# Patient Record
Sex: FEMALE | Race: BLACK OR AFRICAN AMERICAN | Employment: UNEMPLOYED | URBAN - METROPOLITAN AREA
[De-identification: names, ages, dates, MRNs, and addresses within clinical notes are randomized per-mention and may not be internally consistent; named-entity substitution may affect disease eponyms.]

---

## 2018-03-14 ENCOUNTER — HOSPITAL ENCOUNTER (EMERGENCY)
Age: 33
Discharge: HOME | End: 2018-03-14
Attending: EMERGENCY MEDICINE
Payer: MEDICARE

## 2018-03-14 ENCOUNTER — APPOINTMENT (OUTPATIENT)
Dept: GENERAL RADIOLOGY | Age: 33
End: 2018-03-14
Payer: MEDICARE

## 2018-03-14 VITALS
HEART RATE: 82 BPM | DIASTOLIC BLOOD PRESSURE: 92 MMHG | RESPIRATION RATE: 18 BRPM | SYSTOLIC BLOOD PRESSURE: 148 MMHG | OXYGEN SATURATION: 98 % | TEMPERATURE: 97.7 F

## 2018-03-14 DIAGNOSIS — M25.561 ACUTE PAIN OF RIGHT KNEE: Primary | ICD-10-CM

## 2018-03-14 PROCEDURE — 6370000000 HC RX 637 (ALT 250 FOR IP): Performed by: NURSE PRACTITIONER

## 2018-03-14 PROCEDURE — G0382 LEV 3 HOSP TYPE B ED VISIT: HCPCS

## 2018-03-14 PROCEDURE — 73562 X-RAY EXAM OF KNEE 3: CPT

## 2018-03-14 RX ORDER — IBUPROFEN 800 MG/1
800 TABLET ORAL EVERY 8 HOURS PRN
Qty: 20 TABLET | Refills: 0 | Status: SHIPPED | OUTPATIENT
Start: 2018-03-14 | End: 2018-07-05

## 2018-03-14 RX ORDER — IBUPROFEN 800 MG/1
800 TABLET ORAL ONCE
Status: COMPLETED | OUTPATIENT
Start: 2018-03-14 | End: 2018-03-14

## 2018-03-14 RX ADMIN — IBUPROFEN 800 MG: 800 TABLET ORAL at 08:40

## 2018-03-14 ASSESSMENT — PAIN SCALES - GENERAL
PAINLEVEL_OUTOF10: 8
PAINLEVEL_OUTOF10: 8

## 2018-03-14 NOTE — ED PROVIDER NOTES
Colon Cancer Neg Hx     Eclampsia Neg Hx     Ovarian Cancer Neg Hx      Labor Neg Hx     Spont Abortions Neg Hx     Stroke Neg Hx        Allergies:  Patient has no known allergies. Home Medications:  Prior to Admission medications    Medication Sig Start Date End Date Taking? Authorizing Provider   ibuprofen (ADVIL;MOTRIN) 800 MG tablet Take 1 tablet by mouth every 8 hours as needed for Pain 3/14/18  Yes Nicole Naidu CNP   medroxyPROGESTERone (DEPO-PROVERA) 150 MG/ML injection Inject 1 mL into the muscle once for 1 dose Please Give IM Injection every 12 weeks. 16  Michela Dyson MD       REVIEW OF SYSTEMS    (2-9 systems for level 4, 10 or more for level 5)      Review of Systems   Musculoskeletal: Positive for gait problem. Right knee pain   Neurological: Negative for weakness and numbness. PHYSICAL EXAM   (up to 7 for level 4, 8 or more for level 5)      INITIAL VITALS:  oral temperature is 97.7 °F (36.5 °C). Her blood pressure is 148/92 (abnormal) and her pulse is 82. Her respiration is 18 and oxygen saturation is 98%. Physical Exam   Constitutional: She is oriented to person, place, and time. She appears well-developed and well-nourished. No distress. HENT:   Head: Normocephalic and atraumatic. Neck: Normal range of motion. Neck supple. Cardiovascular: Normal rate. Pulmonary/Chest: Effort normal. No respiratory distress. Abdominal: Soft. There is no tenderness. Musculoskeletal: She exhibits tenderness. Right knee stable with exam.  There is no appreciable effusion, no erythema, no warmth. There is no deformity. Patient is able to ambulate with a steady gait. There was some mild point tenderness over the patella. Neurological: She is alert and oriented to person, place, and time. Skin: Skin is warm and dry. Psychiatric: She has a normal mood and affect.  Her behavior is normal. Judgment and thought content normal.   Nursing note and vitals reviewed. DIFFERENTIAL  DIAGNOSIS   Internal knee derangement, right knee pain, right knee strain    PLAN (LABS / IMAGING / EKG):  Orders Placed This Encounter   Procedures    XR KNEE RIGHT (3 VIEWS)    Apply ace wrap    Velcro Knee Immobilizer       MEDICATIONS ORDERED:  Orders Placed This Encounter   Medications    ibuprofen (ADVIL;MOTRIN) tablet 800 mg    ibuprofen (ADVIL;MOTRIN) 800 MG tablet     Sig: Take 1 tablet by mouth every 8 hours as needed for Pain     Dispense:  20 tablet     Refill:  0       Controlled Substances Monitoring:      DIAGNOSTIC RESULTS / EMERGENCY DEPARTMENT COURSE / MDM     RADIOLOGY:   I directly visualized (with the attending physician) the following  images and reviewed the radiologist interpretations:  Xr Knee Right (3 Views)    Result Date: 3/14/2018  EXAMINATION: 3 VIEWS OF THE RIGHT KNEE 3/14/2018 9:09 am COMPARISON: None. HISTORY: ORDERING SYSTEM PROVIDED HISTORY: pain TECHNOLOGIST PROVIDED HISTORY: Reason for exam:->pain FINDINGS: No evidence of acute fracture or dislocation. No focal osseous lesion. No evidence of joint effusion. No focal soft tissue abnormality. No acute osseous abnormality of the knee. XR KNEE RIGHT (3 VIEWS)   Final Result   No acute osseous abnormality of the knee. LABS:  No results found for this visit on 03/14/18. EMERGENCY DEPARTMENT COURSE:  He did advise patient why an MRI would not be necessary in the emergency room environment. Patient does not appear satisfied with this explanation. I advised her she would probably benefit from physical therapy first and that we would do an x-ray. Patient advised of negative x-ray results. We will provide some type of compression to right knee. Patient understands importance of following up with her PCP for a follow-up appointment. She also understands she return to emergency room for any worsening symptoms or new concerns.     CONSULTS:  None    PROCEDURES:  None    FINAL

## 2018-03-14 NOTE — ED PROVIDER NOTES
GCS score is 15. Skin: Skin is warm and dry. No rash noted. She is not diaphoretic. No erythema. Psychiatric: Mood and affect normal.     We'll discharge patient to follow up with orthopedics. I told patient that she would have to get an MRI as an outpatient if indicated by orthopedics. Patient understands. Patient will get pain control and a knee immobilizer for comfort. Patient advised to only wear this when she is up walking and not when she is relaxing/seated at home/sleeping. I performed a history and physical examination of the patient and discussed management with the resident. I reviewed the residents note and agree with the documented findings and plan of care. Any areas of disagreement are noted on the chart. I was personally present for the key portions of any procedures. I have documented in the chart those procedures where I was not present during the key portions. I have personally reviewed all images and agree with the resident's interpretation. I have reviewed the emergency nurses triage note. I agree with the chief complaint, past medical history, past surgical history, allergies, medications, social and family history as documented unless otherwise noted below. Documentation of the HPI, Physical Exam and Medical Decision Making performed by medical students or scribes is based on my personal performance of the HPI, PE and MDM. For Phys Assistant/ Nurse Practitioner cases/documentation I have had a face to face evaluation of this patient and have completed at least one if not all key elements of the E/M (history, physical exam, and MDM). Additional findings are as noted. For APC cases I have personally evaluated and examined the patient in conjunction with the APC and agree with the treatment plan and disposition of the patient as recorded by the APC.     Sudeep Valles MD  Attending Emergency  Physician        Loreta Abbasi MD  03/14/18 [id number]

## 2018-05-29 ENCOUNTER — TELEPHONE (OUTPATIENT)
Dept: OBGYN | Age: 33
End: 2018-05-29

## 2018-05-30 ENCOUNTER — NURSE ONLY (OUTPATIENT)
Dept: OBGYN | Age: 33
End: 2018-05-30
Payer: MEDICARE

## 2018-05-30 VITALS — HEIGHT: 60 IN | WEIGHT: 214.9 LBS | TEMPERATURE: 98.2 F | BODY MASS INDEX: 42.19 KG/M2

## 2018-05-30 DIAGNOSIS — N91.2 AMENORRHEA: Primary | ICD-10-CM

## 2018-05-30 LAB
CONTROL: ABNORMAL
PREGNANCY TEST URINE, POC: POSITIVE

## 2018-05-30 PROCEDURE — 99211 OFF/OP EST MAY X REQ PHY/QHP: CPT | Performed by: OBSTETRICS & GYNECOLOGY

## 2018-05-30 PROCEDURE — 81025 URINE PREGNANCY TEST: CPT | Performed by: OBSTETRICS & GYNECOLOGY

## 2018-06-07 ENCOUNTER — TELEPHONE (OUTPATIENT)
Dept: OBGYN | Age: 33
End: 2018-06-07

## 2018-06-07 DIAGNOSIS — Z34.90 PREGNANCY WITH FETUS OF UNKNOWN GESTATIONAL AGE: Primary | ICD-10-CM

## 2018-06-27 ENCOUNTER — ANCILLARY PROCEDURE (OUTPATIENT)
Dept: OBGYN | Age: 33
End: 2018-06-27
Payer: MEDICARE

## 2018-06-27 DIAGNOSIS — Z34.90 PREGNANCY WITH FETUS OF UNKNOWN GESTATIONAL AGE: ICD-10-CM

## 2018-06-27 PROCEDURE — 76801 OB US < 14 WKS SINGLE FETUS: CPT | Performed by: RADIOLOGY

## 2018-06-27 PROCEDURE — 76801 OB US < 14 WKS SINGLE FETUS: CPT | Performed by: OBSTETRICS & GYNECOLOGY

## 2018-07-05 ENCOUNTER — INITIAL PRENATAL (OUTPATIENT)
Dept: OBGYN | Age: 33
End: 2018-07-05
Payer: MEDICARE

## 2018-07-05 ENCOUNTER — OFFICE VISIT (OUTPATIENT)
Dept: OBGYN | Age: 33
End: 2018-07-05
Payer: MEDICARE

## 2018-07-05 VITALS
BODY MASS INDEX: 42.97 KG/M2 | SYSTOLIC BLOOD PRESSURE: 104 MMHG | DIASTOLIC BLOOD PRESSURE: 70 MMHG | WEIGHT: 220 LBS | HEART RATE: 87 BPM

## 2018-07-05 DIAGNOSIS — Z64.1 GRAND MULTIPARA: ICD-10-CM

## 2018-07-05 DIAGNOSIS — Z78.9 UNKNOWN VARICELLA VACCINATION STATUS: ICD-10-CM

## 2018-07-05 DIAGNOSIS — O26.22: ICD-10-CM

## 2018-07-05 DIAGNOSIS — Z87.51 HISTORY OF PRETERM DELIVERY: ICD-10-CM

## 2018-07-05 DIAGNOSIS — J45.20 MILD INTERMITTENT ASTHMA WITHOUT COMPLICATION: ICD-10-CM

## 2018-07-05 DIAGNOSIS — Z92.89 HISTORY OF BLOOD TRANSFUSION: ICD-10-CM

## 2018-07-05 DIAGNOSIS — O09.91 HIGH-RISK PREGNANCY, FIRST TRIMESTER: ICD-10-CM

## 2018-07-05 DIAGNOSIS — O09.91 HIGH-RISK PREGNANCY, FIRST TRIMESTER: Primary | ICD-10-CM

## 2018-07-05 DIAGNOSIS — Z98.891 HISTORY OF VBAC: ICD-10-CM

## 2018-07-05 DIAGNOSIS — D57.3 SICKLE CELL TRAIT (HCC): ICD-10-CM

## 2018-07-05 DIAGNOSIS — O99.210 OBESITY COMPLICATING PREGNANCY, CHILDBIRTH, OR PUERPERIUM, ANTEPARTUM: ICD-10-CM

## 2018-07-05 DIAGNOSIS — J45.20 MILD INTERMITTENT ASTHMA WITHOUT COMPLICATION: Primary | ICD-10-CM

## 2018-07-05 DIAGNOSIS — Z98.891 HISTORY OF C-SECTION: ICD-10-CM

## 2018-07-05 DIAGNOSIS — T80.92XA BLOOD TRANSFUSION REACTION, INITIAL ENCOUNTER: ICD-10-CM

## 2018-07-05 PROCEDURE — 1036F TOBACCO NON-USER: CPT | Performed by: OBSTETRICS & GYNECOLOGY

## 2018-07-05 PROCEDURE — H1000 PRENATAL CARE ATRISK ASSESSM: HCPCS | Performed by: OBSTETRICS & GYNECOLOGY

## 2018-07-05 PROCEDURE — 99212 OFFICE O/P EST SF 10 MIN: CPT | Performed by: OBSTETRICS & GYNECOLOGY

## 2018-07-05 PROCEDURE — G8417 CALC BMI ABV UP PARAM F/U: HCPCS | Performed by: OBSTETRICS & GYNECOLOGY

## 2018-07-05 PROCEDURE — G8428 CUR MEDS NOT DOCUMENT: HCPCS | Performed by: OBSTETRICS & GYNECOLOGY

## 2018-07-05 RX ORDER — IBUPROFEN 200 MG
1 TABLET ORAL DAILY
Qty: 30 TABLET | Refills: 12 | Status: SHIPPED | OUTPATIENT
Start: 2018-07-05 | End: 2020-09-16 | Stop reason: ALTCHOICE

## 2018-07-05 RX ORDER — ALBUTEROL SULFATE 90 UG/1
2 AEROSOL, METERED RESPIRATORY (INHALATION) EVERY 6 HOURS PRN
Qty: 1 INHALER | Refills: 3 | Status: SHIPPED | OUTPATIENT
Start: 2018-07-05 | End: 2022-09-12 | Stop reason: SDUPTHER

## 2018-07-05 NOTE — PROGRESS NOTES
Carmine Barraza  2018    YOB: 1985          The patient was seen today. She is here regarding request for inhaler to treat her asthma. Megan Conway HPI:  Carmine Barraza is a 28 y.o. female [id number] Patient's last menstrual period was 2018 (exact date). Gestational age 8 8/9 weeks. At the time of her initial OB Education visit she requested a RF for her albuterol inhaler. Pt reports a history of asthma since childhood. She had a PCP at the [facility] but is scheduled to see a new doctor at the end of August.  Pt reports and episode of SOB last week and become dizzy and lightheaded. She used her child's inhaler and her symptoms resolved. Pt denies CP, SOB or wheezing today. Denies bleeding since her LMP.         Obstetric History       T2      L4     SAB4   TAB1   Ectopic0   Molar0   Multiple1   Live Births4       # Outcome Date GA Lbr Bairon/2nd Weight Sex Delivery Anes PTL Lv   10 Current            9 Term 14 40w2d 04:32 6 lb 8 oz (2.948 kg) M   N SUN      Name: Warren Settin                Apgar5: 9   8   36w0d  4 lb 4 oz (1.928 kg) F CS-LTranv   SUN   7   36w0d  4 lb 8 oz (2.041 kg) M CS-LTranv   SUN   6 Term  39w0d  5 lb 10 oz (2.551 kg) F Vag-Spont   SUN   5 SAB            4 SAB            3 SAB            2 SAB            1 TAB               Obstetric Comments   Same partner with all of her pregnancies        Past Medical History:   Diagnosis Date    Asthma     Blood transfusion reaction     2008 after csection    Complication of anesthesia     back pain for 1 year after the spinal     Microcytic anemia 3/3/2014    Sickle cell trait ([facility] [address] 2013       Past Surgical History:   Procedure Laterality Date     SECTION             Family History   Problem Relation Age of Onset    Diabetes Father     Hypertension Father     Diabetes Mother     Hypertension Mother     Depression Mother     Mental Illness Mother     Other Sister         CP  complications     Heart Attack Brother     Cancer Maternal Aunt     Uterine Cancer Maternal Aunt     Breast Cancer Neg Hx     Colon Cancer Neg Hx     Eclampsia Neg Hx     Ovarian Cancer Neg Hx      Labor Neg Hx     Spont Abortions Neg Hx     Stroke Neg Hx        Social History     Social History    Marital status: Single     Spouse name: N/A    Number of children: N/A    Years of education: N/A     Occupational History    Not on file. Social History Main Topics    Smoking status: Never Smoker    Smokeless tobacco: Never Used    Alcohol use No    Drug use: No    Sexual activity: Yes     Partners: Male     Other Topics Concern    Not on file     Social History Narrative    No narrative on file         MEDICATIONS:  Current Outpatient Prescriptions on File Prior to Visit   Medication Sig Dispense Refill    Prenatal Multivit-Min-Fe-FA (PRENATAL FORTE) TABS Take 1 tablet by mouth Daily May  Substitute with any prenatal vit pt insurance will cover 30 tablet 12     No current facility-administered medications on file prior to visit. ALLERGIES:  Allergies as of 2018    (No Known Allergies)       Last menstrual period 2018, not currently breastfeeding. Patient is in no acute distress. Heart: regular rhythm, no murmurs  Chest: clear, no wheezes        Lab Results:  Results for orders placed or performed in visit on 18   POCT urine pregnancy   Result Value Ref Range    Preg Test, Ur Positive     Control hCG          Assessment:       Diagnosis Orders   1. Mild intermittent asthma without complication     2. High-risk pregnancy, first trimester           PLAN:    1. Mild intermittent asthma without complication  - albuterol inhaler Rx  - pt discouraged from using medications prescribed to others    2. High-risk pregnancy, first trimester  - return for initial ob visit.         Patient was seen with total face to face

## 2018-07-19 ENCOUNTER — ROUTINE PRENATAL (OUTPATIENT)
Dept: PERINATAL CARE | Age: 33
End: 2018-07-19
Payer: MEDICARE

## 2018-07-19 ENCOUNTER — HOSPITAL ENCOUNTER (OUTPATIENT)
Age: 33
Setting detail: SPECIMEN
Discharge: HOME | End: 2018-07-19
Payer: MEDICARE

## 2018-07-19 VITALS
WEIGHT: 219 LBS | SYSTOLIC BLOOD PRESSURE: 109 MMHG | RESPIRATION RATE: 16 BRPM | BODY MASS INDEX: 43 KG/M2 | TEMPERATURE: 98.7 F | DIASTOLIC BLOOD PRESSURE: 67 MMHG | HEART RATE: 82 BPM | HEIGHT: 60 IN

## 2018-07-19 DIAGNOSIS — O34.219 PREVIOUS CESAREAN DELIVERY, ANTEPARTUM CONDITION OR COMPLICATION: ICD-10-CM

## 2018-07-19 DIAGNOSIS — O09.91 HIGH-RISK PREGNANCY, FIRST TRIMESTER: ICD-10-CM

## 2018-07-19 DIAGNOSIS — Z36.9 FIRST TRIMESTER SCREENING: Primary | ICD-10-CM

## 2018-07-19 DIAGNOSIS — O99.211 OBESITY AFFECTING PREGNANCY IN FIRST TRIMESTER: ICD-10-CM

## 2018-07-19 DIAGNOSIS — Z78.9 UNKNOWN VARICELLA VACCINATION STATUS: ICD-10-CM

## 2018-07-19 DIAGNOSIS — Z3A.13 13 WEEKS GESTATION OF PREGNANCY: ICD-10-CM

## 2018-07-19 DIAGNOSIS — O99.810 ABNORMAL MATERNAL GLUCOSE TOLERANCE, ANTEPARTUM: ICD-10-CM

## 2018-07-19 DIAGNOSIS — J45.909 ASTHMA AFFECTING PREGNANCY, ANTEPARTUM: ICD-10-CM

## 2018-07-19 DIAGNOSIS — O99.519 ASTHMA AFFECTING PREGNANCY, ANTEPARTUM: ICD-10-CM

## 2018-07-19 DIAGNOSIS — O36.80X0 ENCOUNTER TO DETERMINE FETAL VIABILITY OF PREGNANCY, SINGLE OR UNSPECIFIED FETUS: ICD-10-CM

## 2018-07-19 LAB
-: NORMAL
ABO/RH: NORMAL
ABSOLUTE EOS #: 0.11 K/UL (ref 0–0.44)
ABSOLUTE IMMATURE GRANULOCYTE: 0.08 K/UL (ref 0–0.3)
ABSOLUTE LYMPH #: 2.61 K/UL (ref 1.1–3.7)
ABSOLUTE MONO #: 0.6 K/UL (ref 0.1–1.2)
AMORPHOUS: NORMAL
ANTIBODY SCREEN: NEGATIVE
BACTERIA: NORMAL
BASOPHILS # BLD: 1 % (ref 0–2)
BASOPHILS ABSOLUTE: 0.07 K/UL (ref 0–0.2)
BILIRUBIN URINE: NEGATIVE
CASTS UA: NORMAL /LPF (ref 0–8)
COLOR: YELLOW
CRYSTALS, UA: NORMAL /HPF
DIFFERENTIAL TYPE: ABNORMAL
EOSINOPHILS RELATIVE PERCENT: 1 % (ref 1–4)
EPITHELIAL CELLS UA: NORMAL /HPF (ref 0–5)
GLUCOSE ADMINISTRATION: NORMAL
GLUCOSE TOLERANCE SCREEN 50G: 133 MG/DL (ref 70–135)
GLUCOSE URINE: NEGATIVE
HCT VFR BLD CALC: 35.8 % (ref 36.3–47.1)
HEMOGLOBIN: 11.2 G/DL (ref 11.9–15.1)
HEPATITIS B SURFACE ANTIGEN: NONREACTIVE
HIV AG/AB: NONREACTIVE
IMMATURE GRANULOCYTES: 1 %
KETONES, URINE: NEGATIVE
LEUKOCYTE ESTERASE, URINE: NEGATIVE
LYMPHOCYTES # BLD: 19 % (ref 24–43)
MCH RBC QN AUTO: 23.9 PG (ref 25.2–33.5)
MCHC RBC AUTO-ENTMCNC: 31.3 G/DL (ref 28.4–34.8)
MCV RBC AUTO: 76.5 FL (ref 82.6–102.9)
MONOCYTES # BLD: 4 % (ref 3–12)
MUCUS: NORMAL
NITRITE, URINE: NEGATIVE
NRBC AUTOMATED: 0 PER 100 WBC
OTHER OBSERVATIONS UA: NORMAL
PDW BLD-RTO: 15 % (ref 11.8–14.4)
PH UA: 5 (ref 5–8)
PLATELET # BLD: 324 K/UL (ref 138–453)
PLATELET ESTIMATE: ABNORMAL
PMV BLD AUTO: 11.3 FL (ref 8.1–13.5)
PROTEIN UA: NEGATIVE
RBC # BLD: 4.68 M/UL (ref 3.95–5.11)
RBC # BLD: ABNORMAL 10*6/UL
RBC UA: NORMAL /HPF (ref 0–4)
RENAL EPITHELIAL, UA: NORMAL /HPF
RUBV IGG SER QL: 150.2 IU/ML
SEG NEUTROPHILS: 74 % (ref 36–65)
SEGMENTED NEUTROPHILS ABSOLUTE COUNT: 10.62 K/UL (ref 1.5–8.1)
SPECIFIC GRAVITY UA: 1.02 (ref 1–1.03)
T. PALLIDUM, IGG: NONREACTIVE
TRICHOMONAS: NORMAL
TURBIDITY: CLEAR
URINE HGB: NEGATIVE
UROBILINOGEN, URINE: NORMAL
WBC # BLD: 14.1 K/UL (ref 3.5–11.3)
WBC # BLD: ABNORMAL 10*3/UL
WBC UA: NORMAL /HPF (ref 0–5)
YEAST: NORMAL

## 2018-07-19 PROCEDURE — 87340 HEPATITIS B SURFACE AG IA: CPT

## 2018-07-19 PROCEDURE — 76813 OB US NUCHAL MEAS 1 GEST: CPT | Performed by: OBSTETRICS & GYNECOLOGY

## 2018-07-19 PROCEDURE — 82950 GLUCOSE TEST: CPT

## 2018-07-19 PROCEDURE — 76801 OB US < 14 WKS SINGLE FETUS: CPT | Performed by: OBSTETRICS & GYNECOLOGY

## 2018-07-19 PROCEDURE — 87389 HIV-1 AG W/HIV-1&-2 AB AG IA: CPT

## 2018-07-19 PROCEDURE — 81001 URINALYSIS AUTO W/SCOPE: CPT

## 2018-07-19 PROCEDURE — 86850 RBC ANTIBODY SCREEN: CPT

## 2018-07-19 PROCEDURE — 86901 BLOOD TYPING SEROLOGIC RH(D): CPT

## 2018-07-19 PROCEDURE — 36415 COLL VENOUS BLD VENIPUNCTURE: CPT

## 2018-07-19 PROCEDURE — 86762 RUBELLA ANTIBODY: CPT

## 2018-07-19 PROCEDURE — 86787 VARICELLA-ZOSTER ANTIBODY: CPT

## 2018-07-19 PROCEDURE — 87086 URINE CULTURE/COLONY COUNT: CPT

## 2018-07-19 PROCEDURE — 86900 BLOOD TYPING SEROLOGIC ABO: CPT

## 2018-07-19 PROCEDURE — 86780 TREPONEMA PALLIDUM: CPT

## 2018-07-19 PROCEDURE — 85025 COMPLETE CBC W/AUTO DIFF WBC: CPT

## 2018-07-20 LAB
CULTURE: NO GROWTH
Lab: NORMAL
SPECIMEN DESCRIPTION: NORMAL
STATUS: NORMAL
VZV IGG SER QL IA: 0.3

## 2018-07-21 DIAGNOSIS — O99.810 ABNORMAL GLUCOSE TOLERANCE TEST IN PREGNANCY: Primary | ICD-10-CM

## 2018-07-21 PROBLEM — Z28.39 SUSCEPTIBLE TO VARICELLA (NON-IMMUNE), CURRENTLY PREGNANT: Status: ACTIVE | Noted: 2018-07-21

## 2018-07-21 PROBLEM — O09.899 SUSCEPTIBLE TO VARICELLA (NON-IMMUNE), CURRENTLY PREGNANT: Status: ACTIVE | Noted: 2018-07-21

## 2018-07-23 ENCOUNTER — ROUTINE PRENATAL (OUTPATIENT)
Dept: OBGYN | Age: 33
End: 2018-07-23
Payer: MEDICARE

## 2018-07-23 ENCOUNTER — HOSPITAL ENCOUNTER (OUTPATIENT)
Age: 33
Setting detail: SPECIMEN
Discharge: HOME | End: 2018-07-23
Payer: MEDICARE

## 2018-07-23 VITALS
HEART RATE: 103 BPM | BODY MASS INDEX: 43.22 KG/M2 | WEIGHT: 221.3 LBS | DIASTOLIC BLOOD PRESSURE: 84 MMHG | SYSTOLIC BLOOD PRESSURE: 109 MMHG

## 2018-07-23 DIAGNOSIS — Z3A.14 14 WEEKS GESTATION OF PREGNANCY: ICD-10-CM

## 2018-07-23 DIAGNOSIS — O09.92 HIGH-RISK PREGNANCY IN SECOND TRIMESTER: Primary | ICD-10-CM

## 2018-07-23 LAB
DIRECT EXAM: NORMAL
Lab: NORMAL
SPECIMEN DESCRIPTION: NORMAL
STATUS: NORMAL

## 2018-07-23 PROCEDURE — 99213 OFFICE O/P EST LOW 20 MIN: CPT | Performed by: STUDENT IN AN ORGANIZED HEALTH CARE EDUCATION/TRAINING PROGRAM

## 2018-07-23 PROCEDURE — 87801 DETECT AGNT MULT DNA AMPLI: CPT | Performed by: STUDENT IN AN ORGANIZED HEALTH CARE EDUCATION/TRAINING PROGRAM

## 2018-07-23 PROCEDURE — P3000 SCREEN PAP BY TECH W MD SUPV: HCPCS | Performed by: STUDENT IN AN ORGANIZED HEALTH CARE EDUCATION/TRAINING PROGRAM

## 2018-07-23 PROCEDURE — 87480 CANDIDA DNA DIR PROBE: CPT | Performed by: STUDENT IN AN ORGANIZED HEALTH CARE EDUCATION/TRAINING PROGRAM

## 2018-07-23 PROCEDURE — 99213 OFFICE O/P EST LOW 20 MIN: CPT | Performed by: OBSTETRICS & GYNECOLOGY

## 2018-07-23 NOTE — PROGRESS NOTES
Date: 2018  Time: 10:48 AM      Patient Name: Zhang Pollard  Patient : 1985  Room/Bed: Room/bed info not found  Admission Date/Time: No admission date for patient encounter. Attending Physician Statement  I have discussed the care of Zhang Pollard, including pertinent history and exam findings with the resident. I have reviewed and edited their note in the electronic medical record. The key elements of all parts of the encounter have been performed/reviewed by me . I agree with the assessment, plan and orders as documented by the resident. The level of care submitted represents to the best of my ability the care documented in the medical record today. GC Modifier. This service has been performed in part by a resident under the direction of a teaching physician. Patient was seen today for her initial pre-yolanda physician visit @ 14w2d. First trimester genetic screen was normal. Pap smear and cultures obtained today and FHR was affirmed by hand-held doppler device. Fetal anatomy screen is already scheduled with MFM. RTO in 4 weeks.     Attending's Name:  Gretchen Mayer MD
T-97.8     Orally  R-18  HT-5'0\"   Pain-None     Rate-
(100.4 kg)       BP: 109/84  Weight: 221 lb 4.8 oz (100.4 kg)  Pulse: 103  Patient Position: Sitting  Albumin: Negative  Glucose: Negative     Physical Exam: Completed, See Epic Navigator        Assessment & Plan:  Jass Contreras is a 28 y.o. female [id number] at 14w2d Initial Obstetrical Visit   - The patient was seen full history and physical was completed/reviewed. - Prenatal labs done   - Prenatal vitamins prescription Given   - Problem list reviewed and updated   - First Trimester/NT Screen : results pending, MSAFP ordered   - Follow up with MFM for Anatomy scan, appointment scheduled   - Gc/Chlam Cultures & Wet Prep Collected, results pending   - Pap Smear done, results pending    Asthma   - Follow up with PCP for management    Elevated early 1 hour GTT   - early 3 hour GTT ordered    History of 1 successful    - Patient plans for TOLAC for this pregnancy    Upon completion of the visit all questions were answered and the patients follow-up and testing schedule were reviewed. Patient Active Problem List    Diagnosis Date Noted    History of  2014     Priority: High             Asthma 2014     Priority: Low     Mild intermittent, Pt reports she has appt with her provider at [facility] 2018   Pt states her asthma is more problematic since she became pregnant   18- Pt saw Dr. Armando Waller today and rx sent to her pharm for inhaler       Abnormal glucose tolerance test in pregnancy 2018     3 hr GTT      Susceptible to varicella (non-immune), currently pregnant 2018    Obesity affecting pregnancy in first trimester 2018    Abnormal maternal glucose tolerance, antepartum 2018    Asthma affecting pregnancy, antepartum 2018    Previous  delivery, antepartum condition or complication     Sickle cell trait ([facility] [address] 2018     Same Partner. Pt reports that her partner Patel Keys is neg for trait.  Consider testing partner if not

## 2018-07-24 LAB
C TRACH DNA GENITAL QL NAA+PROBE: NEGATIVE
N. GONORRHOEAE DNA: NEGATIVE

## 2018-07-25 LAB
HPV SAMPLE: NORMAL
HPV SOURCE: NORMAL
HPV, GENOTYPE 16: NOT DETECTED
HPV, GENOTYPE 18: NOT DETECTED
HPV, HIGH RISK OTHER: NOT DETECTED
HPV, INTERPRETATION: NORMAL

## 2018-08-07 LAB — CYTOLOGY REPORT: NORMAL

## 2018-08-30 ENCOUNTER — ROUTINE PRENATAL (OUTPATIENT)
Dept: OBGYN | Age: 33
End: 2018-08-30
Payer: MEDICARE

## 2018-08-30 ENCOUNTER — HOSPITAL ENCOUNTER (OUTPATIENT)
Age: 33
Setting detail: SPECIMEN
Discharge: HOME | End: 2018-08-30
Payer: MEDICARE

## 2018-08-30 VITALS
DIASTOLIC BLOOD PRESSURE: 71 MMHG | BODY MASS INDEX: 44.27 KG/M2 | SYSTOLIC BLOOD PRESSURE: 117 MMHG | WEIGHT: 226.7 LBS | HEART RATE: 101 BPM

## 2018-08-30 DIAGNOSIS — O09.92 HRP (HIGH RISK PREGNANCY), SECOND TRIMESTER: ICD-10-CM

## 2018-08-30 DIAGNOSIS — Z3A.19 19 WEEKS GESTATION OF PREGNANCY: ICD-10-CM

## 2018-08-30 DIAGNOSIS — O09.92 HRP (HIGH RISK PREGNANCY), SECOND TRIMESTER: Primary | ICD-10-CM

## 2018-08-30 PROCEDURE — G8417 CALC BMI ABV UP PARAM F/U: HCPCS | Performed by: OBSTETRICS & GYNECOLOGY

## 2018-08-30 PROCEDURE — 36415 COLL VENOUS BLD VENIPUNCTURE: CPT

## 2018-08-30 PROCEDURE — 99212 OFFICE O/P EST SF 10 MIN: CPT | Performed by: OBSTETRICS & GYNECOLOGY

## 2018-08-30 PROCEDURE — 1036F TOBACCO NON-USER: CPT | Performed by: OBSTETRICS & GYNECOLOGY

## 2018-08-30 PROCEDURE — 99213 OFFICE O/P EST LOW 20 MIN: CPT | Performed by: OBSTETRICS & GYNECOLOGY

## 2018-08-30 PROCEDURE — 82105 ALPHA-FETOPROTEIN SERUM: CPT

## 2018-08-30 PROCEDURE — G8427 DOCREV CUR MEDS BY ELIG CLIN: HCPCS | Performed by: OBSTETRICS & GYNECOLOGY

## 2018-09-02 LAB
AFP INTERPRETATION: NORMAL
AFP MOM: 1.02
AFP SPECIMEN: NORMAL
AFP: 46 NG/ML
DATE OF BIRTH: NORMAL
DATING METHOD: NORMAL
DETERMINED BY: NORMAL
DIABETIC: NO
DUE DATE: NORMAL
ESTIMATED DUE DATE: NORMAL
FAMILY HISTORY NTD: NO
GESTATIONAL AGE: NORMAL
INSULIN REQ DIABETES: NO
LAST MENSTRUAL PERIOD: NORMAL
MATERNAL AGE AT EDD: 33.4 YR
MATERNAL WEIGHT: 226
MONOCHORIONIC TWINS: NORMAL
NUMBER OF FETUSES: NORMAL
PATIENT WEIGHT UNITS: NORMAL
PATIENT WEIGHT: NORMAL
RACE (MATERNAL): NORMAL
RACE: NORMAL
REPEAT SPECIMEN?: NORMAL
SMOKING: NO
SMOKING: NO
VALPROIC/CARBAMAZEP: NORMAL
ZZ NTE CLEAN UP: HISTORY: NO

## 2018-09-06 ENCOUNTER — ROUTINE PRENATAL (OUTPATIENT)
Dept: PERINATAL CARE | Age: 33
End: 2018-09-06
Payer: MEDICARE

## 2018-09-06 VITALS
WEIGHT: 227 LBS | SYSTOLIC BLOOD PRESSURE: 122 MMHG | HEIGHT: 60 IN | TEMPERATURE: 98.5 F | RESPIRATION RATE: 16 BRPM | HEART RATE: 103 BPM | DIASTOLIC BLOOD PRESSURE: 72 MMHG | BODY MASS INDEX: 44.57 KG/M2

## 2018-09-06 DIAGNOSIS — O99.810 ABNORMAL MATERNAL GLUCOSE TOLERANCE, ANTEPARTUM: ICD-10-CM

## 2018-09-06 DIAGNOSIS — J45.909 ASTHMA AFFECTING PREGNANCY, ANTEPARTUM: ICD-10-CM

## 2018-09-06 DIAGNOSIS — O99.519 ASTHMA AFFECTING PREGNANCY, ANTEPARTUM: ICD-10-CM

## 2018-09-06 DIAGNOSIS — Z3A.20 20 WEEKS GESTATION OF PREGNANCY: ICD-10-CM

## 2018-09-06 DIAGNOSIS — O99.212 OBESITY AFFECTING PREGNANCY IN SECOND TRIMESTER: Primary | ICD-10-CM

## 2018-09-06 DIAGNOSIS — Z36.86 ENCOUNTER FOR SCREENING FOR RISK OF PRE-TERM LABOR: ICD-10-CM

## 2018-09-06 PROCEDURE — 76811 OB US DETAILED SNGL FETUS: CPT | Performed by: OBSTETRICS & GYNECOLOGY

## 2018-09-06 PROCEDURE — 76817 TRANSVAGINAL US OBSTETRIC: CPT | Performed by: OBSTETRICS & GYNECOLOGY

## 2018-09-27 ENCOUNTER — ROUTINE PRENATAL (OUTPATIENT)
Dept: OBGYN | Age: 33
End: 2018-09-27
Payer: MEDICARE

## 2018-09-27 ENCOUNTER — TELEPHONE (OUTPATIENT)
Dept: OBGYN | Age: 33
End: 2018-09-27

## 2018-09-27 VITALS — SYSTOLIC BLOOD PRESSURE: 118 MMHG | WEIGHT: 227.1 LBS | BODY MASS INDEX: 44.35 KG/M2 | DIASTOLIC BLOOD PRESSURE: 86 MMHG

## 2018-09-27 DIAGNOSIS — Z3A.23 23 WEEKS GESTATION OF PREGNANCY: ICD-10-CM

## 2018-09-27 DIAGNOSIS — O09.92 HRP (HIGH RISK PREGNANCY), SECOND TRIMESTER: Primary | ICD-10-CM

## 2018-09-27 PROCEDURE — 1036F TOBACCO NON-USER: CPT | Performed by: OBSTETRICS & GYNECOLOGY

## 2018-09-27 PROCEDURE — 99212 OFFICE O/P EST SF 10 MIN: CPT | Performed by: OBSTETRICS & GYNECOLOGY

## 2018-09-27 PROCEDURE — G8427 DOCREV CUR MEDS BY ELIG CLIN: HCPCS | Performed by: OBSTETRICS & GYNECOLOGY

## 2018-09-27 PROCEDURE — 99213 OFFICE O/P EST LOW 20 MIN: CPT | Performed by: OBSTETRICS & GYNECOLOGY

## 2018-09-27 PROCEDURE — G8417 CALC BMI ABV UP PARAM F/U: HCPCS | Performed by: OBSTETRICS & GYNECOLOGY

## 2018-09-27 NOTE — PATIENT INSTRUCTIONS
The patient will return to the office for her next visit in 4 weeks. 28 weeks labs deferred until the next clinic visit.

## 2018-09-27 NOTE — PROGRESS NOTES
Juan Antonio Power 35 y.o. [id number] at 23w5d     Weight 227 lb 1.6 oz (103 kg), last menstrual period 2018, not currently breastfeeding. The patient was seen and evaluated. There was positive fetal movements. No contractions or leakage of fluid. Signs and symptoms of  labor as well as labor were reviewed. The S/S of Pre-Eclampsia were reviewed with the patient in detail. She is to report any of these if they occur. She currently denies any of these. The patient had her 28 week labs: Deferred until next clinic visit. The patient was instructed on fetal kick counts and was given a kick sheet to complete every 8 hours. She was instructed that the baby should move at a minimum of ten times within one hour after a meal. The patient was instructed to lay down on her left side twenty minutes after eating and count movements for up to one hour with a target value of ten movements. She was instructed to notify the office if she did not make that target after two attempts or if after any attempt there was less than four movements. The patient reports that the targets have been made Yes.     Patient Active Problem List   Diagnosis    Asthma    Obesity    History of     Sickle cell trait ([facility] [address]    History of     Grand multipara    Four previous spontaneous abortions (SAB) affecting care of mother, antepartum, second trimester    History of blood transfusion    Blood transfusion reaction    History of  delivery    High-risk pregnancy, first trimester    Obesity affecting pregnancy in first trimester    Abnormal maternal glucose tolerance, antepartum    Asthma affecting pregnancy, antepartum    Previous  delivery, antepartum condition or complication    Abnormal glucose tolerance test in pregnancy    Susceptible to varicella (non-immune), currently pregnant    Obesity affecting pregnancy in second trimester           The patient will return to the office for her next visit in 4 weeks. 28 weeks labs deferred until the next clinic visit. See antepartum flow sheet.

## 2018-10-29 ENCOUNTER — HOSPITAL ENCOUNTER (OUTPATIENT)
Age: 33
Setting detail: SPECIMEN
Discharge: HOME | End: 2018-10-29
Payer: MEDICARE

## 2018-10-29 ENCOUNTER — ROUTINE PRENATAL (OUTPATIENT)
Dept: OBGYN | Age: 33
End: 2018-10-29
Payer: MEDICARE

## 2018-10-29 VITALS
DIASTOLIC BLOOD PRESSURE: 74 MMHG | WEIGHT: 231.9 LBS | BODY MASS INDEX: 45.29 KG/M2 | HEART RATE: 103 BPM | SYSTOLIC BLOOD PRESSURE: 108 MMHG

## 2018-10-29 DIAGNOSIS — Z3A.28 28 WEEKS GESTATION OF PREGNANCY: ICD-10-CM

## 2018-10-29 DIAGNOSIS — O09.93 HIGH-RISK PREGNANCY IN THIRD TRIMESTER: Primary | ICD-10-CM

## 2018-10-29 PROCEDURE — 99213 OFFICE O/P EST LOW 20 MIN: CPT | Performed by: STUDENT IN AN ORGANIZED HEALTH CARE EDUCATION/TRAINING PROGRAM

## 2018-10-29 PROCEDURE — G8427 DOCREV CUR MEDS BY ELIG CLIN: HCPCS | Performed by: STUDENT IN AN ORGANIZED HEALTH CARE EDUCATION/TRAINING PROGRAM

## 2018-10-29 PROCEDURE — 1036F TOBACCO NON-USER: CPT | Performed by: STUDENT IN AN ORGANIZED HEALTH CARE EDUCATION/TRAINING PROGRAM

## 2018-10-29 PROCEDURE — G8417 CALC BMI ABV UP PARAM F/U: HCPCS | Performed by: STUDENT IN AN ORGANIZED HEALTH CARE EDUCATION/TRAINING PROGRAM

## 2018-10-29 PROCEDURE — 90715 TDAP VACCINE 7 YRS/> IM: CPT | Performed by: OBSTETRICS & GYNECOLOGY

## 2018-10-29 PROCEDURE — G8484 FLU IMMUNIZE NO ADMIN: HCPCS | Performed by: STUDENT IN AN ORGANIZED HEALTH CARE EDUCATION/TRAINING PROGRAM

## 2018-10-29 PROCEDURE — 99212 OFFICE O/P EST SF 10 MIN: CPT | Performed by: STUDENT IN AN ORGANIZED HEALTH CARE EDUCATION/TRAINING PROGRAM

## 2018-10-29 NOTE — PROGRESS NOTES
Prenatal Visit    Haylie Paredes is a 35 y.o. female [id number] at 28w2d    The patient was seen and evaluated. She denies any complaints. There was positive fetal movements. She denies contractions, vaginal bleeding and leakage of fluid. Signs and symptoms of  labor as well as labor were reviewed. The S/S of Pre-Eclampsia were reviewed with the patient in detail. She is to report any of these if they occur. She currently denies any of these. The patient was instructed on fetal kick counts every 8 hours. She was instructed that the baby should move at a minimum of ten times within one hour after a meal. The patient was instructed to lay down on her left side twenty minutes after eating and count movements for up to one hour with a target value of ten movements. She was instructed to notify the office if she did not make that target after two attempts or if after any attempt there was less than four movements. The patient reports that the targets have been made. The patient requested the T-Dap Vaccine (27-36 weeks) this pregnancy. The patient declined the influenza vaccine this year. The problem list reflects the active issues addressed during today's visit    Vitals:  BP: 108/74  Weight: 231 lb 14.4 oz (105.2 kg)  Pulse: 103  Patient Position: Sitting  Albumin: Negative  Glucose: Negative  Fundal Height (cm): 28 cm  Fetal Heart Rate: 140  Movement: Present     28 Week Labs: The patient is RH positive, Rhogam not indicated  ABO/Rh   Date Value Ref Range Status   2018 B POSITIVE  Final       Early 1hr GTT: 133, 3 hr GTT ordered    28 week CBC: ordered  UA w/ Ur C&S: ordered     Assessment & Plan:  Haylie Paredes is a 35 y.o. female [id number] at 28w2d   - 28 week labs ordered   - discussed recommendations for TDAP immunization, patient requested TDAP.    - Declines flu shot at this visit, will consider next time    - PO hydration encouraged    - S/S of  labor reviewed     Elevated early 1 hr GTT    - Early 1 hr    - 3 hr GTT ordered    Obesity   - ASA 81 mg daily    - Follow up US with MFM on     Sickle cell trait    - Pt reports her partner has had negative Hgb electrophoresis in the past    - Records not visible in Epic   - Encouraged pt to bring partner's lab records    Patient Active Problem List    Diagnosis Date Noted    History of  2014     Priority: High             Asthma 2014     Priority: Low     Mild intermittent, Pt reports she [facility] 2018   Pt states her asthma is more problematic since she became pregnant   18- Pt saw Dr. Ignacio Walden today and rx sent to her pharm for inhaler       Obesity affecting pregnancy in second trimester 2018    Abnormal glucose tolerance test in pregnancy 2018     3 hr GTT      Susceptible to varicella (non-immune), currently pregnant 2018    Obesity affecting pregnancy in first trimester 2018    Abnormal maternal glucose tolerance, antepartum 2018    Asthma affecting pregnancy, antepartum 2018    Previous  delivery, antepartum condition or complication     Sickle cell trait ([facility] [address] 2018     Same Partner. Pt reports that her partner Genevive Maxim is neg for trait. Consider testing partner if not already done.  History of  2018 for twin gestation      [facility] [place] multip[place] 2018    Four previous spontaneous abortions (SAB) affecting care of mother, antepartum, second trimester 2018    History of blood transfusion 2018 PP Twin delivery       Blood transfusion reaction 2018      History of  delivery 2018     Indicated- twin delivery       High-risk pregnancy, first trimester 2018- MFM referral placed for first trimester screen and anatomy scan u/s       Obesity 2014- Early diabetic screening.   1 hr gtt ordered       Return in about 2 weeks (around 2018) for GABRIELLA with Dr. Ramos Mayorga.    The patient was counseled on signs and symptoms of  labor and recommended to go to the hospital if any of the signs are experienced. The patient was counseled on Labor & Delivery. Route of delivery and counseling on vaginal, operative vaginal, and  sections were completed with the risks of each to both the patient as well as her baby. The possibility of a blood transfusion was discussed as well. The patient was not opposed to receiving a transfusion if needed. The patient was counseled on the mandatory call ahead policy. She has been instructed to call the office at anytime prior to going into the hospital so the on-call physician may direct her to the appropriate facility for care. Exceptions were reviewed including but not limited to: Decreased fetal movement, vaginal Bleeding or hemorrhage, trauma, readily expectant delivery, or any instance where she feels 911 should be utilized.     Zoe Loo DO  Ob/Gyn Resident  [facility], [address]  10/29/2018, 10:52 AM

## 2018-10-31 ENCOUNTER — HOSPITAL ENCOUNTER (OUTPATIENT)
Age: 33
Setting detail: SPECIMEN
Discharge: HOME | End: 2018-10-31
Payer: MEDICARE

## 2018-10-31 DIAGNOSIS — Z3A.28 28 WEEKS GESTATION OF PREGNANCY: ICD-10-CM

## 2018-10-31 LAB
ABSOLUTE EOS #: 0.17 K/UL (ref 0–0.44)
ABSOLUTE IMMATURE GRANULOCYTE: 0.14 K/UL (ref 0–0.3)
ABSOLUTE LYMPH #: 2.59 K/UL (ref 1.1–3.7)
ABSOLUTE MONO #: 0.88 K/UL (ref 0.1–1.2)
AMOUNT GLUCOSE GIVEN: 100 G
BASOPHILS # BLD: 0 % (ref 0–2)
BASOPHILS ABSOLUTE: 0.05 K/UL (ref 0–0.2)
CULTURE: NORMAL
DIFFERENTIAL TYPE: ABNORMAL
EOSINOPHILS RELATIVE PERCENT: 1 % (ref 1–4)
GLUCOSE FASTING: 76 MG/DL (ref 65–99)
GLUCOSE TOLERANCE TEST 1 HOUR: 134 MG/DL (ref 65–184)
GLUCOSE TOLERANCE TEST 2 HOUR: 108 MG/DL (ref 65–139)
GLUCOSE TOLERANCE TEST 3 HOUR: 108 MG/DL (ref 65–130)
HCT VFR BLD CALC: 33.1 % (ref 36.3–47.1)
HEMOGLOBIN: 10.2 G/DL (ref 11.9–15.1)
IMMATURE GRANULOCYTES: 1 %
LYMPHOCYTES # BLD: 17 % (ref 24–43)
Lab: NORMAL
MCH RBC QN AUTO: 23.4 PG (ref 25.2–33.5)
MCHC RBC AUTO-ENTMCNC: 30.8 G/DL (ref 28.4–34.8)
MCV RBC AUTO: 76.1 FL (ref 82.6–102.9)
MONOCYTES # BLD: 6 % (ref 3–12)
NRBC AUTOMATED: 0 PER 100 WBC
PDW BLD-RTO: 15.9 % (ref 11.8–14.4)
PLATELET # BLD: 292 K/UL (ref 138–453)
PLATELET ESTIMATE: ABNORMAL
PMV BLD AUTO: 11.2 FL (ref 8.1–13.5)
RBC # BLD: 4.35 M/UL (ref 3.95–5.11)
RBC # BLD: ABNORMAL 10*6/UL
SEG NEUTROPHILS: 75 % (ref 36–65)
SEGMENTED NEUTROPHILS ABSOLUTE COUNT: 11.04 K/UL (ref 1.5–8.1)
SPECIMEN DESCRIPTION: NORMAL
STATUS: NORMAL
WBC # BLD: 14.9 K/UL (ref 3.5–11.3)
WBC # BLD: ABNORMAL 10*3/UL

## 2018-10-31 PROCEDURE — 36415 COLL VENOUS BLD VENIPUNCTURE: CPT

## 2018-10-31 PROCEDURE — 82951 GLUCOSE TOLERANCE TEST (GTT): CPT

## 2018-10-31 PROCEDURE — 85025 COMPLETE CBC W/AUTO DIFF WBC: CPT

## 2018-10-31 PROCEDURE — 82952 GTT-ADDED SAMPLES: CPT

## 2018-11-01 ENCOUNTER — ROUTINE PRENATAL (OUTPATIENT)
Dept: PERINATAL CARE | Age: 33
End: 2018-11-01
Payer: MEDICARE

## 2018-11-01 VITALS
SYSTOLIC BLOOD PRESSURE: 122 MMHG | HEART RATE: 96 BPM | DIASTOLIC BLOOD PRESSURE: 80 MMHG | HEIGHT: 60 IN | TEMPERATURE: 98.6 F | RESPIRATION RATE: 16 BRPM | WEIGHT: 234 LBS | BODY MASS INDEX: 45.94 KG/M2

## 2018-11-01 DIAGNOSIS — Z13.89 ENCOUNTER FOR ROUTINE SCREENING FOR MALFORMATION USING ULTRASONICS: ICD-10-CM

## 2018-11-01 DIAGNOSIS — J45.909 ASTHMA AFFECTING PREGNANCY IN THIRD TRIMESTER: ICD-10-CM

## 2018-11-01 DIAGNOSIS — O34.219 PREVIOUS CESAREAN DELIVERY, ANTEPARTUM CONDITION OR COMPLICATION: ICD-10-CM

## 2018-11-01 DIAGNOSIS — Z36.4 ANTENATAL SCREENING FOR FETAL GROWTH RETARDATION USING ULTRASONICS: ICD-10-CM

## 2018-11-01 DIAGNOSIS — O99.513 ASTHMA AFFECTING PREGNANCY IN THIRD TRIMESTER: ICD-10-CM

## 2018-11-01 DIAGNOSIS — O99.213 OBESITY AFFECTING PREGNANCY IN THIRD TRIMESTER: Primary | ICD-10-CM

## 2018-11-01 DIAGNOSIS — Z3A.28 28 WEEKS GESTATION OF PREGNANCY: ICD-10-CM

## 2018-11-01 DIAGNOSIS — O99.810 ABNORMAL GLUCOSE TOLERANCE TEST IN PREGNANCY: ICD-10-CM

## 2018-11-01 PROCEDURE — 76805 OB US >/= 14 WKS SNGL FETUS: CPT | Performed by: OBSTETRICS & GYNECOLOGY

## 2018-11-01 PROCEDURE — 76819 FETAL BIOPHYS PROFIL W/O NST: CPT | Performed by: OBSTETRICS & GYNECOLOGY

## 2018-11-13 ENCOUNTER — ROUTINE PRENATAL (OUTPATIENT)
Dept: OBGYN | Age: 33
End: 2018-11-13
Payer: MEDICARE

## 2018-11-13 VITALS
DIASTOLIC BLOOD PRESSURE: 73 MMHG | WEIGHT: 231.1 LBS | HEART RATE: 118 BPM | SYSTOLIC BLOOD PRESSURE: 109 MMHG | BODY MASS INDEX: 45.13 KG/M2

## 2018-11-13 DIAGNOSIS — O09.93 HIGH-RISK PREGNANCY IN THIRD TRIMESTER: Primary | ICD-10-CM

## 2018-11-13 DIAGNOSIS — J45.20 MILD INTERMITTENT ASTHMA WITHOUT COMPLICATION: ICD-10-CM

## 2018-11-13 DIAGNOSIS — O99.810 ABNORMAL GLUCOSE TOLERANCE TEST IN PREGNANCY: ICD-10-CM

## 2018-11-13 DIAGNOSIS — Z98.891 HISTORY OF C-SECTION: ICD-10-CM

## 2018-11-13 DIAGNOSIS — O99.210 OBESITY COMPLICATING PREGNANCY, CHILDBIRTH, OR PUERPERIUM, ANTEPARTUM: ICD-10-CM

## 2018-11-13 DIAGNOSIS — Z3A.30 30 WEEKS GESTATION OF PREGNANCY: ICD-10-CM

## 2018-11-13 PROCEDURE — G8484 FLU IMMUNIZE NO ADMIN: HCPCS | Performed by: STUDENT IN AN ORGANIZED HEALTH CARE EDUCATION/TRAINING PROGRAM

## 2018-11-13 PROCEDURE — G8427 DOCREV CUR MEDS BY ELIG CLIN: HCPCS | Performed by: STUDENT IN AN ORGANIZED HEALTH CARE EDUCATION/TRAINING PROGRAM

## 2018-11-13 PROCEDURE — 99213 OFFICE O/P EST LOW 20 MIN: CPT | Performed by: STUDENT IN AN ORGANIZED HEALTH CARE EDUCATION/TRAINING PROGRAM

## 2018-11-13 PROCEDURE — 1036F TOBACCO NON-USER: CPT | Performed by: STUDENT IN AN ORGANIZED HEALTH CARE EDUCATION/TRAINING PROGRAM

## 2018-11-13 PROCEDURE — 99212 OFFICE O/P EST SF 10 MIN: CPT | Performed by: STUDENT IN AN ORGANIZED HEALTH CARE EDUCATION/TRAINING PROGRAM

## 2018-11-13 PROCEDURE — G8417 CALC BMI ABV UP PARAM F/U: HCPCS | Performed by: STUDENT IN AN ORGANIZED HEALTH CARE EDUCATION/TRAINING PROGRAM

## 2018-11-13 RX ORDER — LANOLIN ALCOHOL/MO/W.PET/CERES
25 CREAM (GRAM) TOPICAL 3 TIMES DAILY
Qty: 50 TABLET | Refills: 1 | Status: SHIPPED | OUTPATIENT
Start: 2018-11-13 | End: 2019-07-23 | Stop reason: ALTCHOICE

## 2018-11-13 NOTE — PROGRESS NOTES
Prenatal Visit    Jacinta Novak is a 35 y.o. female [id number] at 30w3d    The patient was seen and evaluated. She complains of intermittent nausea. There was positive fetal movements. She denies contractions, vaginal bleeding and leakage of fluid. Signs and symptoms of  labor as well as labor were reviewed. The S/S of Pre-Eclampsia were reviewed with the patient in detail. She is to report any of these if they occur. She currently denies any of these. Patient was seen by primary care physician regarding worsening asthma in pregnancy. She previously was controlled with a rescue inhaler, but long acting inhaler was added and she reports significant improvement    The patient was instructed on fetal kick counts and was given a kick sheet to complete every 8 hours. She was instructed that the baby should move at a minimum of ten times within one hour after a meal. The patient was instructed to lay down on her left side twenty minutes after eating and count movements for up to one hour with a target value of ten movements. She was instructed to notify the office if she did not make that target after two attempts or if after any attempt there was less than four movements. The patient reports that the targets have been made. The patient already received the T-Dap Vaccine (27-36 weeks) this pregnancy (10/29/18). The patient declined the influenza vaccine this year. The problem list reflects the active issues addressed during today's visit    Vitals:    BP: 109/73  Weight: 231 lb 1.6 oz (104.8 kg)  Pulse: 118  Patient Position: Sitting  Albumin: Trace  Glucose: Negative  Fundal Height (cm): 30 cm  Fetal Heart Rate: 135  Movement: Present     28 Week Labs:   The patient is RH positive, Rhogam not indicated  ABO/Rh   Date Value Ref Range Status   2018 B POSITIVE  Final       1hr GTT: completed, 133  3hr GTT: completed,    F: 76   1hr: 134   2hr: 108   3hr: 108  28 week CBC:   Lab Results   Component Value Date    WBC 14.9 (H) 10/31/2018    HGB 10.2 (L) 10/31/2018    HCT 33.1 (L) 10/31/2018    MCV 76.1 (L) 10/31/2018     10/31/2018     UA w/ Ur C&S: negative     Assessment & Plan:  oDra Hillman is a 35 y.o. female [id number] at 30w3d   - 28 week labs completed   - discussed recommendations for TDAP immunization, patient already received TDAP   -  desired for this pregnancy, Hx of successful . R/B/A discussed and patient verbalized understanding.    - Vit B6 and Unisom sent to pharmacy for intermittent nausea. Discussed smaller more frequent meals to help with nausea symptoms. Patient Active Problem List    Diagnosis Date Noted    History of  (G9) 2014     Priority: High             History of  2018     Priority: Medium     G7: 2008 for twin gestation       BMI >45 2014     Priority: Low     ASA 81mg daily   testing to start at 32 weeks per M recommendation  18- Early diabetic screening. 1 hr gtt ordered      Asthma 2014     Priority: Low     Mild intermittent, On long acting and rescue inhaler.  Obesity affecting pregnancy in third trimester 2018    Asthma affecting pregnancy in third trimester 2018    Obesity affecting pregnancy in second trimester 2018    Abnormal glucose tolerance test in pregnancy 2018     3 hr GTT wnl      Susceptible to varicella (non-immune), currently pregnant 2018    Obesity affecting pregnancy in first trimester 2018    Abnormal maternal glucose tolerance, antepartum 2018    Asthma affecting pregnancy, antepartum 2018    Previous  delivery, antepartum condition or complication     Sickle cell trait ([facility] [address] 2018     Same Partner. Pt reports that her partner Sophia Mane is neg for trait. Consider testing partner if not already done.        Grand multipara 2018    Four previous spontaneous abortions (SAB) affecting care

## 2018-11-19 ENCOUNTER — TELEPHONE (OUTPATIENT)
Dept: OBGYN | Age: 33
End: 2018-11-19

## 2018-11-21 ENCOUNTER — TELEPHONE (OUTPATIENT)
Dept: OBGYN | Age: 33
End: 2018-11-21

## 2018-11-27 ENCOUNTER — ROUTINE PRENATAL (OUTPATIENT)
Dept: OBGYN | Age: 33
End: 2018-11-27
Payer: MEDICARE

## 2018-11-27 ENCOUNTER — HOSPITAL ENCOUNTER (OUTPATIENT)
Age: 33
Discharge: HOME | End: 2018-11-27
Attending: OBSTETRICS & GYNECOLOGY | Admitting: OBSTETRICS & GYNECOLOGY
Payer: MEDICARE

## 2018-11-27 VITALS
WEIGHT: 234.2 LBS | DIASTOLIC BLOOD PRESSURE: 76 MMHG | HEART RATE: 112 BPM | SYSTOLIC BLOOD PRESSURE: 107 MMHG | BODY MASS INDEX: 45.74 KG/M2

## 2018-11-27 VITALS — TEMPERATURE: 97.6 F | DIASTOLIC BLOOD PRESSURE: 72 MMHG | RESPIRATION RATE: 18 BRPM | SYSTOLIC BLOOD PRESSURE: 120 MMHG

## 2018-11-27 DIAGNOSIS — Z3A.32 32 WEEKS GESTATION OF PREGNANCY: ICD-10-CM

## 2018-11-27 DIAGNOSIS — O09.93 HIGH-RISK PREGNANCY IN THIRD TRIMESTER: Primary | ICD-10-CM

## 2018-11-27 PROBLEM — O09.91 HIGH-RISK PREGNANCY, FIRST TRIMESTER: Status: RESOLVED | Noted: 2018-07-05 | Resolved: 2018-11-27

## 2018-11-27 PROCEDURE — 99213 OFFICE O/P EST LOW 20 MIN: CPT

## 2018-11-27 PROCEDURE — 6370000000 HC RX 637 (ALT 250 FOR IP): Performed by: STUDENT IN AN ORGANIZED HEALTH CARE EDUCATION/TRAINING PROGRAM

## 2018-11-27 PROCEDURE — G8417 CALC BMI ABV UP PARAM F/U: HCPCS | Performed by: STUDENT IN AN ORGANIZED HEALTH CARE EDUCATION/TRAINING PROGRAM

## 2018-11-27 PROCEDURE — 59025 FETAL NON-STRESS TEST: CPT | Performed by: OBSTETRICS & GYNECOLOGY

## 2018-11-27 PROCEDURE — 1036F TOBACCO NON-USER: CPT | Performed by: STUDENT IN AN ORGANIZED HEALTH CARE EDUCATION/TRAINING PROGRAM

## 2018-11-27 PROCEDURE — 99212 OFFICE O/P EST SF 10 MIN: CPT | Performed by: STUDENT IN AN ORGANIZED HEALTH CARE EDUCATION/TRAINING PROGRAM

## 2018-11-27 PROCEDURE — G8484 FLU IMMUNIZE NO ADMIN: HCPCS | Performed by: STUDENT IN AN ORGANIZED HEALTH CARE EDUCATION/TRAINING PROGRAM

## 2018-11-27 PROCEDURE — G8427 DOCREV CUR MEDS BY ELIG CLIN: HCPCS | Performed by: STUDENT IN AN ORGANIZED HEALTH CARE EDUCATION/TRAINING PROGRAM

## 2018-11-27 PROCEDURE — 59025 FETAL NON-STRESS TEST: CPT

## 2018-11-27 PROCEDURE — 99213 OFFICE O/P EST LOW 20 MIN: CPT | Performed by: STUDENT IN AN ORGANIZED HEALTH CARE EDUCATION/TRAINING PROGRAM

## 2018-11-27 RX ORDER — ACETAMINOPHEN 500 MG
1000 TABLET ORAL ONCE
Status: COMPLETED | OUTPATIENT
Start: 2018-11-27 | End: 2018-11-27

## 2018-11-27 RX ADMIN — ACETAMINOPHEN 1000 MG: 500 TABLET ORAL at 11:40

## 2018-11-27 ASSESSMENT — PAIN SCALES - GENERAL: PAINLEVEL_OUTOF10: 5

## 2018-11-29 ENCOUNTER — ROUTINE PRENATAL (OUTPATIENT)
Dept: PERINATAL CARE | Age: 33
End: 2018-11-29
Payer: MEDICARE

## 2018-11-29 VITALS
HEART RATE: 110 BPM | HEIGHT: 60 IN | SYSTOLIC BLOOD PRESSURE: 115 MMHG | BODY MASS INDEX: 46.13 KG/M2 | WEIGHT: 235 LBS | RESPIRATION RATE: 16 BRPM | TEMPERATURE: 98.4 F | DIASTOLIC BLOOD PRESSURE: 73 MMHG

## 2018-11-29 DIAGNOSIS — Z3A.32 32 WEEKS GESTATION OF PREGNANCY: ICD-10-CM

## 2018-11-29 DIAGNOSIS — J45.909 ASTHMA AFFECTING PREGNANCY IN THIRD TRIMESTER: ICD-10-CM

## 2018-11-29 DIAGNOSIS — O99.513 ASTHMA AFFECTING PREGNANCY IN THIRD TRIMESTER: ICD-10-CM

## 2018-11-29 DIAGNOSIS — O41.93X0 ABNORMAL AMNIOTIC FLUID IN THIRD TRIMESTER, SINGLE OR UNSPECIFIED FETUS: Primary | ICD-10-CM

## 2018-11-29 DIAGNOSIS — Z36.4 ANTENATAL SCREENING FOR FETAL GROWTH RETARDATION USING ULTRASONICS: ICD-10-CM

## 2018-11-29 DIAGNOSIS — O99.213 OBESITY AFFECTING PREGNANCY IN THIRD TRIMESTER: ICD-10-CM

## 2018-11-29 PROCEDURE — 76816 OB US FOLLOW-UP PER FETUS: CPT | Performed by: OBSTETRICS & GYNECOLOGY

## 2018-11-29 PROCEDURE — 76820 UMBILICAL ARTERY ECHO: CPT | Performed by: OBSTETRICS & GYNECOLOGY

## 2018-11-29 PROCEDURE — 76821 MIDDLE CEREBRAL ARTERY ECHO: CPT | Performed by: OBSTETRICS & GYNECOLOGY

## 2018-11-29 PROCEDURE — 76819 FETAL BIOPHYS PROFIL W/O NST: CPT | Performed by: OBSTETRICS & GYNECOLOGY

## 2018-11-30 ENCOUNTER — TELEPHONE (OUTPATIENT)
Dept: OBGYN | Age: 33
End: 2018-11-30

## 2018-12-03 ENCOUNTER — HOSPITAL ENCOUNTER (OUTPATIENT)
Age: 33
Discharge: HOME | End: 2018-12-03
Attending: OBSTETRICS & GYNECOLOGY | Admitting: OBSTETRICS & GYNECOLOGY
Payer: MEDICARE

## 2018-12-03 ENCOUNTER — ROUTINE PRENATAL (OUTPATIENT)
Dept: OBGYN | Age: 33
End: 2018-12-03
Payer: MEDICARE

## 2018-12-03 VITALS
DIASTOLIC BLOOD PRESSURE: 66 MMHG | TEMPERATURE: 99 F | HEART RATE: 107 BPM | SYSTOLIC BLOOD PRESSURE: 133 MMHG | RESPIRATION RATE: 20 BRPM

## 2018-12-03 VITALS
WEIGHT: 234.2 LBS | BODY MASS INDEX: 45.74 KG/M2 | DIASTOLIC BLOOD PRESSURE: 80 MMHG | SYSTOLIC BLOOD PRESSURE: 124 MMHG | HEART RATE: 113 BPM

## 2018-12-03 DIAGNOSIS — O99.210 OBESITY COMPLICATING PREGNANCY, CHILDBIRTH, OR PUERPERIUM, ANTEPARTUM: Primary | ICD-10-CM

## 2018-12-03 PROBLEM — O09.90 HIGH RISK PREGNANCY, ANTEPARTUM: Status: ACTIVE | Noted: 2018-12-03

## 2018-12-03 PROCEDURE — 76818 FETAL BIOPHYS PROFILE W/NST: CPT | Performed by: OBSTETRICS & GYNECOLOGY

## 2018-12-03 PROCEDURE — 59025 FETAL NON-STRESS TEST: CPT | Performed by: STUDENT IN AN ORGANIZED HEALTH CARE EDUCATION/TRAINING PROGRAM

## 2018-12-03 PROCEDURE — G8484 FLU IMMUNIZE NO ADMIN: HCPCS | Performed by: STUDENT IN AN ORGANIZED HEALTH CARE EDUCATION/TRAINING PROGRAM

## 2018-12-03 PROCEDURE — 99213 OFFICE O/P EST LOW 20 MIN: CPT | Performed by: STUDENT IN AN ORGANIZED HEALTH CARE EDUCATION/TRAINING PROGRAM

## 2018-12-03 PROCEDURE — G8427 DOCREV CUR MEDS BY ELIG CLIN: HCPCS | Performed by: STUDENT IN AN ORGANIZED HEALTH CARE EDUCATION/TRAINING PROGRAM

## 2018-12-03 PROCEDURE — 1036F TOBACCO NON-USER: CPT | Performed by: STUDENT IN AN ORGANIZED HEALTH CARE EDUCATION/TRAINING PROGRAM

## 2018-12-03 PROCEDURE — 99213 OFFICE O/P EST LOW 20 MIN: CPT

## 2018-12-03 PROCEDURE — G8417 CALC BMI ABV UP PARAM F/U: HCPCS | Performed by: STUDENT IN AN ORGANIZED HEALTH CARE EDUCATION/TRAINING PROGRAM

## 2018-12-03 PROCEDURE — 99211 OFF/OP EST MAY X REQ PHY/QHP: CPT | Performed by: OBSTETRICS & GYNECOLOGY

## 2018-12-03 RX ORDER — ACETAMINOPHEN 500 MG
1000 TABLET ORAL EVERY 6 HOURS PRN
Status: DISCONTINUED | OUTPATIENT
Start: 2018-12-03 | End: 2018-12-03 | Stop reason: HOSPADM

## 2018-12-03 RX ORDER — ONDANSETRON 2 MG/ML
4 INJECTION INTRAMUSCULAR; INTRAVENOUS EVERY 6 HOURS PRN
Status: DISCONTINUED | OUTPATIENT
Start: 2018-12-03 | End: 2018-12-03 | Stop reason: HOSPADM

## 2018-12-03 RX ORDER — ALBUTEROL SULFATE 90 UG/1
2 AEROSOL, METERED RESPIRATORY (INHALATION)
Status: ON HOLD | COMMUNITY
Start: 2017-10-19 | End: 2019-01-13 | Stop reason: HOSPADM

## 2018-12-03 NOTE — PROGRESS NOTES
NST nonreactive after 40 minutes. Baseline 145, no decelerations. TOCO quiet. Patient to go to labor and delivery for BPP, charge nurse and resident informed. NST reviewed by Dr. Hammad Shah.        Saul Vazquez  OB/GYN Resident, PGY2  Pager: [phone] [facility]  12/03/18  10:48 AM

## 2018-12-03 NOTE — H&P
2018    Sickle cell trait  2018     Same Partner. Pt reports that her partner Tanmay Ventura is neg for trait. Consider testing partner if not already done.  History of  2018     G7: 2008 for twin gestation       [address] multipara 2018    Hx of SAB x4 2018    History of blood transfusion 2018 PP Twin delivery       Blood transfusion reaction 2018      H/O Indicated PTD 2018     Indicated- twin delivery       BMI >45 2014     ASA 81mg daily   testing to start at 32 weeks per MFM recommendation  18- Early diabetic screening. 1 hr gtt ordered         Plan discussed with Dr. Gregory Bravo, who is agreeable. Steroids given this admission: No    Risks, benefits, alternatives and possible complications have been discussed in detail with the patient. Admission, and post admission procedures and expectations were discussed in detail. All questions were answered.     Attending's Name: Dr. Javier Gaytan DO  Ob/Gyn Resident  12/3/2018, 12:20 PM

## 2018-12-07 ENCOUNTER — ROUTINE PRENATAL (OUTPATIENT)
Dept: PERINATAL CARE | Age: 33
End: 2018-12-07
Payer: MEDICARE

## 2018-12-07 VITALS
SYSTOLIC BLOOD PRESSURE: 104 MMHG | HEIGHT: 60 IN | WEIGHT: 238 LBS | TEMPERATURE: 98.3 F | RESPIRATION RATE: 16 BRPM | DIASTOLIC BLOOD PRESSURE: 60 MMHG | HEART RATE: 112 BPM | BODY MASS INDEX: 46.72 KG/M2

## 2018-12-07 DIAGNOSIS — O99.213 OBESITY AFFECTING PREGNANCY IN THIRD TRIMESTER: ICD-10-CM

## 2018-12-07 DIAGNOSIS — O41.93X0 ABNORMAL AMNIOTIC FLUID IN THIRD TRIMESTER, SINGLE OR UNSPECIFIED FETUS: Primary | ICD-10-CM

## 2018-12-07 DIAGNOSIS — Z3A.33 33 WEEKS GESTATION OF PREGNANCY: ICD-10-CM

## 2018-12-07 DIAGNOSIS — O99.513 ASTHMA AFFECTING PREGNANCY IN THIRD TRIMESTER: ICD-10-CM

## 2018-12-07 DIAGNOSIS — J45.909 ASTHMA AFFECTING PREGNANCY IN THIRD TRIMESTER: ICD-10-CM

## 2018-12-07 PROCEDURE — 76818 FETAL BIOPHYS PROFILE W/NST: CPT | Performed by: OBSTETRICS & GYNECOLOGY

## 2018-12-07 PROCEDURE — 76820 UMBILICAL ARTERY ECHO: CPT | Performed by: OBSTETRICS & GYNECOLOGY

## 2018-12-07 PROCEDURE — 76821 MIDDLE CEREBRAL ARTERY ECHO: CPT | Performed by: OBSTETRICS & GYNECOLOGY

## 2018-12-10 ENCOUNTER — ROUTINE PRENATAL (OUTPATIENT)
Dept: OBGYN | Age: 33
End: 2018-12-10
Payer: MEDICARE

## 2018-12-10 VITALS
WEIGHT: 239.2 LBS | SYSTOLIC BLOOD PRESSURE: 115 MMHG | HEART RATE: 111 BPM | DIASTOLIC BLOOD PRESSURE: 78 MMHG | BODY MASS INDEX: 46.72 KG/M2

## 2018-12-10 DIAGNOSIS — O09.93 HRP (HIGH RISK PREGNANCY), THIRD TRIMESTER: ICD-10-CM

## 2018-12-10 PROCEDURE — 59025 FETAL NON-STRESS TEST: CPT | Performed by: STUDENT IN AN ORGANIZED HEALTH CARE EDUCATION/TRAINING PROGRAM

## 2018-12-10 PROCEDURE — 59025 FETAL NON-STRESS TEST: CPT | Performed by: OBSTETRICS & GYNECOLOGY

## 2018-12-10 PROCEDURE — 99201 HC NEW PT, E/M LEVEL 1: CPT | Performed by: OBSTETRICS & GYNECOLOGY

## 2018-12-11 ENCOUNTER — ROUTINE PRENATAL (OUTPATIENT)
Dept: OBGYN | Age: 33
End: 2018-12-11
Payer: MEDICARE

## 2018-12-11 ENCOUNTER — HOSPITAL ENCOUNTER (OUTPATIENT)
Age: 33
Setting detail: OBSERVATION
Discharge: HOME | End: 2018-12-13
Attending: OBSTETRICS & GYNECOLOGY | Admitting: OBSTETRICS & GYNECOLOGY
Payer: MEDICARE

## 2018-12-11 VITALS
TEMPERATURE: 98.5 F | BODY MASS INDEX: 46.32 KG/M2 | DIASTOLIC BLOOD PRESSURE: 74 MMHG | SYSTOLIC BLOOD PRESSURE: 118 MMHG | WEIGHT: 237.2 LBS | HEART RATE: 100 BPM

## 2018-12-11 DIAGNOSIS — Z3A.34 34 WEEKS GESTATION OF PREGNANCY: ICD-10-CM

## 2018-12-11 DIAGNOSIS — O26.899 ABDOMINAL PAIN AFFECTING PREGNANCY: ICD-10-CM

## 2018-12-11 DIAGNOSIS — R10.9 ABDOMINAL PAIN AFFECTING PREGNANCY: ICD-10-CM

## 2018-12-11 DIAGNOSIS — O09.93 HIGH-RISK PREGNANCY IN THIRD TRIMESTER: Primary | ICD-10-CM

## 2018-12-11 PROBLEM — R51.9 HEADACHE: Status: ACTIVE | Noted: 2017-02-02

## 2018-12-11 PROBLEM — O99.211 OBESITY AFFECTING PREGNANCY IN FIRST TRIMESTER: Status: RESOLVED | Noted: 2018-07-19 | Resolved: 2018-12-11

## 2018-12-11 PROBLEM — O34.219 PREVIOUS CESAREAN DELIVERY, ANTEPARTUM CONDITION OR COMPLICATION: Status: RESOLVED | Noted: 2018-07-19 | Resolved: 2018-12-11

## 2018-12-11 PROBLEM — J45.909 ASTHMA AFFECTING PREGNANCY, ANTEPARTUM: Status: RESOLVED | Noted: 2018-07-19 | Resolved: 2018-12-11

## 2018-12-11 PROBLEM — R41.3 MEMORY IMPAIRMENT: Status: ACTIVE | Noted: 2017-02-02

## 2018-12-11 PROBLEM — O99.212 OBESITY AFFECTING PREGNANCY IN SECOND TRIMESTER: Status: RESOLVED | Noted: 2018-09-06 | Resolved: 2018-12-11

## 2018-12-11 PROBLEM — O99.513 ASTHMA AFFECTING PREGNANCY IN THIRD TRIMESTER: Status: RESOLVED | Noted: 2018-11-01 | Resolved: 2018-12-11

## 2018-12-11 PROBLEM — G47.00 INSOMNIA: Status: ACTIVE | Noted: 2017-02-02

## 2018-12-11 PROBLEM — O99.519 ASTHMA AFFECTING PREGNANCY, ANTEPARTUM: Status: RESOLVED | Noted: 2018-07-19 | Resolved: 2018-12-11

## 2018-12-11 PROBLEM — J45.909 ASTHMA AFFECTING PREGNANCY IN THIRD TRIMESTER: Status: RESOLVED | Noted: 2018-11-01 | Resolved: 2018-12-11

## 2018-12-11 PROBLEM — R63.5 ABNORMAL WEIGHT GAIN: Status: ACTIVE | Noted: 2017-02-02

## 2018-12-11 PROBLEM — O99.810 ABNORMAL MATERNAL GLUCOSE TOLERANCE, ANTEPARTUM: Status: RESOLVED | Noted: 2018-07-19 | Resolved: 2018-12-11

## 2018-12-11 LAB
ABSOLUTE EOS #: 0.07 K/UL (ref 0–0.44)
ABSOLUTE IMMATURE GRANULOCYTE: 0.13 K/UL (ref 0–0.3)
ABSOLUTE LYMPH #: 1.54 K/UL (ref 1.1–3.7)
ABSOLUTE MONO #: 0.89 K/UL (ref 0.1–1.2)
ALBUMIN SERPL-MCNC: 3.5 G/DL (ref 3.5–5.2)
ALBUMIN/GLOBULIN RATIO: 0.9 (ref 1–2.5)
ALP BLD-CCNC: 201 U/L (ref 35–104)
ALT SERPL-CCNC: 7 U/L (ref 5–33)
AMYLASE: 59 U/L (ref 28–100)
ANION GAP SERPL CALCULATED.3IONS-SCNC: 15 MMOL/L (ref 9–17)
AST SERPL-CCNC: 11 U/L
BASOPHILS # BLD: 0 % (ref 0–2)
BASOPHILS ABSOLUTE: 0.05 K/UL (ref 0–0.2)
BILIRUB SERPL-MCNC: 0.19 MG/DL (ref 0.3–1.2)
BILIRUBIN URINE: NEGATIVE
BUN BLDV-MCNC: 9 MG/DL (ref 6–20)
BUN/CREAT BLD: ABNORMAL (ref 9–20)
CALCIUM SERPL-MCNC: 9.4 MG/DL (ref 8.6–10.4)
CHLORIDE BLD-SCNC: 100 MMOL/L (ref 98–107)
CO2: 21 MMOL/L (ref 20–31)
COLOR: YELLOW
COMMENT UA: ABNORMAL
CREAT SERPL-MCNC: 0.48 MG/DL (ref 0.5–0.9)
DIFFERENTIAL TYPE: ABNORMAL
DIRECT EXAM: NORMAL
EKG ATRIAL RATE: 117 BPM
EKG P AXIS: 65 DEGREES
EKG P-R INTERVAL: 124 MS
EKG Q-T INTERVAL: 314 MS
EKG QRS DURATION: 66 MS
EKG QTC CALCULATION (BAZETT): 438 MS
EKG R AXIS: 31 DEGREES
EKG T AXIS: -2 DEGREES
EKG VENTRICULAR RATE: 117 BPM
EOSINOPHILS RELATIVE PERCENT: 0 % (ref 1–4)
GFR AFRICAN AMERICAN: >60 ML/MIN
GFR NON-AFRICAN AMERICAN: >60 ML/MIN
GFR SERPL CREATININE-BSD FRML MDRD: ABNORMAL ML/MIN/{1.73_M2}
GFR SERPL CREATININE-BSD FRML MDRD: ABNORMAL ML/MIN/{1.73_M2}
GLUCOSE BLD-MCNC: 69 MG/DL (ref 70–99)
GLUCOSE URINE: NEGATIVE
HCT VFR BLD CALC: 34.8 % (ref 36.3–47.1)
HEMOGLOBIN: 11.2 G/DL (ref 11.9–15.1)
IMMATURE GRANULOCYTES: 1 %
KETONES, URINE: ABNORMAL
LEUKOCYTE ESTERASE, URINE: NEGATIVE
LIPASE: 23 U/L (ref 13–60)
LYMPHOCYTES # BLD: 9 % (ref 24–43)
Lab: NORMAL
MCH RBC QN AUTO: 23.4 PG (ref 25.2–33.5)
MCHC RBC AUTO-ENTMCNC: 32.2 G/DL (ref 28.4–34.8)
MCV RBC AUTO: 72.7 FL (ref 82.6–102.9)
MONOCYTES # BLD: 5 % (ref 3–12)
NITRITE, URINE: NEGATIVE
NRBC AUTOMATED: 0 PER 100 WBC
PDW BLD-RTO: 15.8 % (ref 11.8–14.4)
PH UA: 5 (ref 5–8)
PLATELET # BLD: 316 K/UL (ref 138–453)
PLATELET ESTIMATE: ABNORMAL
PMV BLD AUTO: 10.8 FL (ref 8.1–13.5)
POTASSIUM SERPL-SCNC: 3.9 MMOL/L (ref 3.7–5.3)
PROTEIN UA: NEGATIVE
RBC # BLD: 4.79 M/UL (ref 3.95–5.11)
RBC # BLD: ABNORMAL 10*6/UL
SEG NEUTROPHILS: 85 % (ref 36–65)
SEGMENTED NEUTROPHILS ABSOLUTE COUNT: 13.95 K/UL (ref 1.5–8.1)
SODIUM BLD-SCNC: 136 MMOL/L (ref 135–144)
SPECIFIC GRAVITY UA: 1.02 (ref 1–1.03)
SPECIMEN DESCRIPTION: NORMAL
STATUS: NORMAL
TOTAL PROTEIN: 7.6 G/DL (ref 6.4–8.3)
TURBIDITY: CLEAR
URINE HGB: NEGATIVE
UROBILINOGEN, URINE: NORMAL
WBC # BLD: 16.6 K/UL (ref 3.5–11.3)
WBC # BLD: ABNORMAL 10*3/UL

## 2018-12-11 PROCEDURE — 83690 ASSAY OF LIPASE: CPT

## 2018-12-11 PROCEDURE — 80053 COMPREHEN METABOLIC PANEL: CPT

## 2018-12-11 PROCEDURE — 99212 OFFICE O/P EST SF 10 MIN: CPT | Performed by: STUDENT IN AN ORGANIZED HEALTH CARE EDUCATION/TRAINING PROGRAM

## 2018-12-11 PROCEDURE — 82150 ASSAY OF AMYLASE: CPT

## 2018-12-11 PROCEDURE — 1036F TOBACCO NON-USER: CPT | Performed by: STUDENT IN AN ORGANIZED HEALTH CARE EDUCATION/TRAINING PROGRAM

## 2018-12-11 PROCEDURE — 6360000002 HC RX W HCPCS: Performed by: STUDENT IN AN ORGANIZED HEALTH CARE EDUCATION/TRAINING PROGRAM

## 2018-12-11 PROCEDURE — G8417 CALC BMI ABV UP PARAM F/U: HCPCS | Performed by: STUDENT IN AN ORGANIZED HEALTH CARE EDUCATION/TRAINING PROGRAM

## 2018-12-11 PROCEDURE — 81003 URINALYSIS AUTO W/O SCOPE: CPT

## 2018-12-11 PROCEDURE — 85025 COMPLETE CBC W/AUTO DIFF WBC: CPT

## 2018-12-11 PROCEDURE — 99213 OFFICE O/P EST LOW 20 MIN: CPT | Performed by: STUDENT IN AN ORGANIZED HEALTH CARE EDUCATION/TRAINING PROGRAM

## 2018-12-11 PROCEDURE — 2580000003 HC RX 258: Performed by: STUDENT IN AN ORGANIZED HEALTH CARE EDUCATION/TRAINING PROGRAM

## 2018-12-11 PROCEDURE — G8427 DOCREV CUR MEDS BY ELIG CLIN: HCPCS | Performed by: STUDENT IN AN ORGANIZED HEALTH CARE EDUCATION/TRAINING PROGRAM

## 2018-12-11 PROCEDURE — 6370000000 HC RX 637 (ALT 250 FOR IP): Performed by: STUDENT IN AN ORGANIZED HEALTH CARE EDUCATION/TRAINING PROGRAM

## 2018-12-11 PROCEDURE — 93005 ELECTROCARDIOGRAM TRACING: CPT

## 2018-12-11 PROCEDURE — 87804 INFLUENZA ASSAY W/OPTIC: CPT

## 2018-12-11 PROCEDURE — G8484 FLU IMMUNIZE NO ADMIN: HCPCS | Performed by: STUDENT IN AN ORGANIZED HEALTH CARE EDUCATION/TRAINING PROGRAM

## 2018-12-11 RX ORDER — MAGNESIUM HYDROXIDE/ALUMINUM HYDROXICE/SIMETHICONE 120; 1200; 1200 MG/30ML; MG/30ML; MG/30ML
30 SUSPENSION ORAL EVERY 6 HOURS PRN
Status: DISCONTINUED | OUTPATIENT
Start: 2018-12-11 | End: 2018-12-13 | Stop reason: HOSPADM

## 2018-12-11 RX ORDER — PROMETHAZINE HYDROCHLORIDE 25 MG/ML
25 INJECTION, SOLUTION INTRAMUSCULAR; INTRAVENOUS ONCE
Status: COMPLETED | OUTPATIENT
Start: 2018-12-11 | End: 2018-12-11

## 2018-12-11 RX ORDER — ONDANSETRON 2 MG/ML
4 INJECTION INTRAMUSCULAR; INTRAVENOUS EVERY 6 HOURS PRN
Status: DISCONTINUED | OUTPATIENT
Start: 2018-12-11 | End: 2018-12-13 | Stop reason: HOSPADM

## 2018-12-11 RX ORDER — METRONIDAZOLE 500 MG/1
500 TABLET ORAL 2 TIMES DAILY
Qty: 14 TABLET | Refills: 0 | Status: SHIPPED | OUTPATIENT
Start: 2018-12-11 | End: 2018-12-18

## 2018-12-11 RX ORDER — ACETAMINOPHEN 500 MG
1000 TABLET ORAL EVERY 6 HOURS PRN
Status: DISCONTINUED | OUTPATIENT
Start: 2018-12-11 | End: 2018-12-13 | Stop reason: HOSPADM

## 2018-12-11 RX ORDER — SODIUM CHLORIDE, SODIUM LACTATE, POTASSIUM CHLORIDE, AND CALCIUM CHLORIDE .6; .31; .03; .02 G/100ML; G/100ML; G/100ML; G/100ML
2000 INJECTION, SOLUTION INTRAVENOUS ONCE
Status: COMPLETED | OUTPATIENT
Start: 2018-12-11 | End: 2018-12-11

## 2018-12-11 RX ORDER — SODIUM CHLORIDE, SODIUM LACTATE, POTASSIUM CHLORIDE, AND CALCIUM CHLORIDE .6; .31; .03; .02 G/100ML; G/100ML; G/100ML; G/100ML
1000 INJECTION, SOLUTION INTRAVENOUS ONCE
Status: COMPLETED | OUTPATIENT
Start: 2018-12-11 | End: 2018-12-11

## 2018-12-11 RX ADMIN — ALUMINUM HYDROXIDE, MAGNESIUM HYDROXIDE, AND SIMETHICONE 30 ML: 200; 200; 20 SUSPENSION ORAL at 18:26

## 2018-12-11 RX ADMIN — SODIUM CHLORIDE, POTASSIUM CHLORIDE, SODIUM LACTATE AND CALCIUM CHLORIDE 1000 ML: 600; 310; 30; 20 INJECTION, SOLUTION INTRAVENOUS at 13:52

## 2018-12-11 RX ADMIN — SODIUM CHLORIDE, POTASSIUM CHLORIDE, SODIUM LACTATE AND CALCIUM CHLORIDE 1000 ML: 600; 310; 30; 20 INJECTION, SOLUTION INTRAVENOUS at 16:25

## 2018-12-11 RX ADMIN — ACETAMINOPHEN 1000 MG: 500 TABLET ORAL at 16:25

## 2018-12-11 RX ADMIN — ACETAMINOPHEN 1000 MG: 500 TABLET ORAL at 23:47

## 2018-12-11 RX ADMIN — ONDANSETRON 4 MG: 2 INJECTION INTRAMUSCULAR; INTRAVENOUS at 13:52

## 2018-12-11 RX ADMIN — PROMETHAZINE HYDROCHLORIDE 25 MG: 25 INJECTION INTRAMUSCULAR; INTRAVENOUS at 17:33

## 2018-12-11 ASSESSMENT — PATIENT HEALTH QUESTIONNAIRE - PHQ9
SUM OF ALL RESPONSES TO PHQ QUESTIONS 1-9: 0
2. FEELING DOWN, DEPRESSED OR HOPELESS: 0
SUM OF ALL RESPONSES TO PHQ9 QUESTIONS 1 & 2: 0
1. LITTLE INTEREST OR PLEASURE IN DOING THINGS: 0
SUM OF ALL RESPONSES TO PHQ QUESTIONS 1-9: 0
SUM OF ALL RESPONSES TO PHQ QUESTIONS 1-9: 0
SUM OF ALL RESPONSES TO PHQ9 QUESTIONS 1 & 2: 0
2. FEELING DOWN, DEPRESSED OR HOPELESS: 0
SUM OF ALL RESPONSES TO PHQ QUESTIONS 1-9: 0
1. LITTLE INTEREST OR PLEASURE IN DOING THINGS: 0

## 2018-12-11 ASSESSMENT — PAIN SCALES - GENERAL
PAINLEVEL_OUTOF10: 4
PAINLEVEL_OUTOF10: 5

## 2018-12-11 NOTE — H&P
[facility]    Date: [facility]       Time: 2:13 PM   Patient Name: Urbano Chandler     Patient : 1985  Room/Bed: [place] [address]    Admission Date/Time: 2018 12:28 PM      CC: Abdominal cramping, N/V     HPI: Urbano Chandler is a 35 y.o. [id number] at 34w3d who presents from clinic for abdominal cramping, nausea and two episodes of vomiting today. Pt states that for the past 3 days she has been having LUQ cramping abdominal pain as well as pelvic pain and that today she had 2 episodes of N/V today. She had oatmeal this morning for breakfast.  Pt denies any sick contacts. She describes her pain as intermittent, cramping, without provoking or relieving factors, radiating to her low back, and moderate in severity. She denies any headaches, change in vision, RUQ pain, vaginal bleeding, gush of fluid, urinary symptoms, or vaginal discharge. Denies diarrhea. Last intercourse was 2 days ago. The patient reports fetal movement is present, denies contractions, denies loss of fluid, denies vaginal bleeding. Pregnancy is complicated by: H/O C/S x1 (2/2 twin gestation), H/O indicated  delivery, H/O  x1, H/O blood transfusion, H/O SAB x4, grand multiparity, Asthma (stable on long acting inhaler and albuterol PRN), sickle cell trait (patient reports her partner has normal Hgb electrophoresis), and obesity. DATING:  LMP: Patient's last menstrual period was 2018 (exact date).   Estimated Date of Delivery: 19   Based on: LMP, confirmed by early US at 8 5/7 weeks GA    PREGNANCY RISK FACTORS:  Patient Active Problem List   Diagnosis    Asthma    BMI >45    History of  (G9)    Sickle cell trait     History of     Grand multipara    Hx of SAB x4    History of blood transfusion    Blood transfusion reaction    H/O Indicated PTD    Obesity affecting pregnancy in first trimester    Abnormal maternal glucose tolerance, 5A  10/19/08 36w0d  5 lb 4 oz (2.381 kg) M CS-LTranv   SUN   5B  10/19/08   4 lb 8 oz (2.041 kg) F CS-LTranv   SUN   4 Term 07 39w0d  5 lb 10 oz (2.551 kg) F Vag-Spont   SUN   3 2004           2 2004           1 2004              Obstetric Comments   Same partner with all of her pregnancies        PAST MEDICAL HISTORY:   has a past medical history of Asthma; Blood transfusion reaction; Chronic migraine without aura with status migrainosus, not intractable; Complication of anesthesia; History of blood transfusion; Microcytic anemia; and Sickle cell trait ([facility] [address] PAST SURGICAL HISTORY:   has a past surgical history that includes  section. ALLERGIES:  has No Known Allergies. MEDICATIONS:  Prior to Admission medications    Medication Sig Start Date End Date Taking?  Authorizing Provider   Prenatal Multivit-Min-Fe-FA (PRENATAL FORTE) TABS Take 1 tablet by mouth Daily May  Substitute with any prenatal vit pt insurance will cover 18 Yes Katia Dela Cruz MD   albuterol sulfate HFA (PROVENTIL HFA) 108 (90 Base) MCG/ACT inhaler Inhale 2 puffs into the lungs 10/19/17   Historical Provider, MD   vitamin B-6 (PYRIDOXINE) 50 MG tablet Take 0.5 tablets by mouth 3 times daily 18   Abiodun Gonzalez MD   doxyLAMINE succinate (UNISOM) 25 MG tablet Take 0.5 tablets by mouth nightly 18   Abiodun Gonzalez MD   Tiotropium Bromide-Olodaterol 2.5-2.5 MCG/ACT AERS Inhale 2 puffs into the lungs 18   Historical Provider, MD   aspirin 81 MG tablet Take 81 mg by mouth daily    Historical Provider, MD   albuterol sulfate HFA (PROVENTIL HFA) 108 (90 Base) MCG/ACT inhaler Inhale 2 puffs into the lungs every 6 hours as needed for Wheezing 18   Katia Dela Cruz MD       FAMILY HISTORY:  family history includes Cancer in her maternal aunt; Depression in her mother; Diabetes in her father and mother; Heart Attack in her brother; Hypertension in her father and mother; Mental Testing: not available  Anatomy US: Anterior, male, 3VC, normal insertion    ASSESSMENT & PLAN:  Stacey Kaufman is a 35 y.o. female [id number] at 34w3d presenting with abdominal cramping and N/V   - GBS unknown / Rh positive / R immune   - Pen G for GBS prophylaxis if delivery imminent   - cEFM/toco    - SSE: cervix closed and thick    - CBC, CMP, amylase, lipase ordered    - Rapid flu swab   - UA collected and sent   - 1L IV fluid bolus   - IV Zofran    - PO challenge when able     Bacterial vaginosis    - Clue cells present on wet prep    - Will give Rx Flagyl 500 mg PO BID x7d    - Patient instructed to start medication when N/V resolves     H/O C/S x1   - G5, 2/2 twin gestation    H/O indicated  delivery   - G5    H/O  x1   - G8    H/O blood transfusion    H/O SAB x4    Grand multiparity    Asthma   - Stable on long acting inhaler and albuterol PRN     Sickle cell trait   - Patient reports her partner has normal Hgb electrophoresis    Obesity        Patient Active Problem List    Diagnosis Date Noted    History of  (G9) 2014     Priority: High             Asthma 2014     Priority: Low     Mild intermittent, On long acting and rescue inhaler. Overview:   Overview:   Mild intermittent, controlled  Albuterol prn  Never been hospitalized. Denies attacks.        High risk pregnancy, antepartum 2018    Abnormal amniotic fluid in third trimester 2018    32 weeks gestation of pregnancy 2018    High-risk pregnancy in third trimester     Obesity affecting pregnancy in third trimester 2018    Asthma affecting pregnancy in third trimester 2018    Obesity affecting pregnancy in second trimester 2018    Elevated 1hr GTT (normal 3hr) 2018     3 hr GTT wnl      Susceptible to varicella (non-immune), currently pregnant 2018    Obesity affecting pregnancy in first trimester 2018    Abnormal maternal glucose tolerance, antepartum the encounter have been performed/reviewed by me. I agree with the assessment, plan and orders as documented by the resident. The level of care submitted represents to the best of my ability the care documented in the medical record today. GC Modifier. This service has been performed in part by a resident under the direction of a teaching physician. Attending's Name:  Monika Galvez MD        Patient with nausea and vomiting admitted for IV hydration and anti-emetics with category II tracing with spontaneous prolonged decelerations. Admit for monitoring and symptom management.

## 2018-12-11 NOTE — PROGRESS NOTES
I have discussed the care of the patient including pertinent history and examination findings with the resident. I agree with the assessment, and and orders as documented  by the resident. GE Modifier: This service has been performed by a resident physician under the direction of a teaching physician.
already done.  History of  2018     G7: 2008 for twin gestation       Grand multipara 2018    Hx of SAB x4 2018    History of blood transfusion 2018 PP Twin delivery       Blood transfusion reaction 2018      H/O Indicated PTD 2018     Indicated- twin delivery       Abnormal weight gain 2017    Headache 2017    Insomnia 2017    Memory impairment 2017    Increased insulin level 2016    Chronic migraine without aura with status migrainosus, not intractable 2016    Morbid obesity with BMI of 45.0-49.9, adult ([facility] [address] 2016    BMI >45 2014     ASA 81mg daily   testing to start at 32 weeks per M recommendation  18- Early diabetic screening. 1 hr gtt ordered          Diagnosis Orders   1. High-risk pregnancy in third trimester     2. 34 weeks gestation of pregnancy     3. Abdominal pain affecting pregnancy                Testing Indicated: Yes  Scheduled with Nursing-Pt notified:  Yes

## 2018-12-12 PROBLEM — Z3A.34 34 WEEKS GESTATION OF PREGNANCY: Status: ACTIVE | Noted: 2018-12-12

## 2018-12-12 PROBLEM — Z79.899 MEDICATION COURSE CHANGED: Status: ACTIVE | Noted: 2018-12-12

## 2018-12-12 LAB
ABSOLUTE EOS #: <0.03 K/UL (ref 0–0.44)
ABSOLUTE IMMATURE GRANULOCYTE: 0.09 K/UL (ref 0–0.3)
ABSOLUTE LYMPH #: 0.93 K/UL (ref 1.1–3.7)
ABSOLUTE MONO #: 0.87 K/UL (ref 0.1–1.2)
BASOPHILS # BLD: 0 % (ref 0–2)
BASOPHILS ABSOLUTE: 0.03 K/UL (ref 0–0.2)
DIFFERENTIAL TYPE: ABNORMAL
EOSINOPHILS RELATIVE PERCENT: 0 % (ref 1–4)
HCT VFR BLD CALC: 30 % (ref 36.3–47.1)
HEMOGLOBIN: 9.5 G/DL (ref 11.9–15.1)
IMMATURE GRANULOCYTES: 1 %
LYMPHOCYTES # BLD: 8 % (ref 24–43)
MCH RBC QN AUTO: 23.3 PG (ref 25.2–33.5)
MCHC RBC AUTO-ENTMCNC: 31.7 G/DL (ref 28.4–34.8)
MCV RBC AUTO: 73.7 FL (ref 82.6–102.9)
MONOCYTES # BLD: 8 % (ref 3–12)
NRBC AUTOMATED: 0 PER 100 WBC
PDW BLD-RTO: 15.9 % (ref 11.8–14.4)
PLATELET # BLD: 265 K/UL (ref 138–453)
PLATELET ESTIMATE: ABNORMAL
PMV BLD AUTO: 10.9 FL (ref 8.1–13.5)
RBC # BLD: 4.07 M/UL (ref 3.95–5.11)
RBC # BLD: ABNORMAL 10*6/UL
SEG NEUTROPHILS: 83 % (ref 36–65)
SEGMENTED NEUTROPHILS ABSOLUTE COUNT: 9.36 K/UL (ref 1.5–8.1)
TSH SERPL DL<=0.05 MIU/L-ACNC: 0.84 MIU/L (ref 0.3–5)
WBC # BLD: 11.3 K/UL (ref 3.5–11.3)
WBC # BLD: ABNORMAL 10*3/UL

## 2018-12-12 PROCEDURE — 6360000002 HC RX W HCPCS: Performed by: OBSTETRICS & GYNECOLOGY

## 2018-12-12 PROCEDURE — 36415 COLL VENOUS BLD VENIPUNCTURE: CPT

## 2018-12-12 PROCEDURE — 76819 FETAL BIOPHYS PROFIL W/O NST: CPT | Performed by: OBSTETRICS & GYNECOLOGY

## 2018-12-12 PROCEDURE — 76820 UMBILICAL ARTERY ECHO: CPT | Performed by: OBSTETRICS & GYNECOLOGY

## 2018-12-12 PROCEDURE — 76821 MIDDLE CEREBRAL ARTERY ECHO: CPT | Performed by: OBSTETRICS & GYNECOLOGY

## 2018-12-12 PROCEDURE — 85025 COMPLETE CBC W/AUTO DIFF WBC: CPT

## 2018-12-12 PROCEDURE — 96372 THER/PROPH/DIAG INJ SC/IM: CPT

## 2018-12-12 PROCEDURE — 99231 SBSQ HOSP IP/OBS SF/LOW 25: CPT | Performed by: OBSTETRICS & GYNECOLOGY

## 2018-12-12 PROCEDURE — 84443 ASSAY THYROID STIM HORMONE: CPT

## 2018-12-12 PROCEDURE — G0378 HOSPITAL OBSERVATION PER HR: HCPCS

## 2018-12-12 PROCEDURE — 6370000000 HC RX 637 (ALT 250 FOR IP): Performed by: STUDENT IN AN ORGANIZED HEALTH CARE EDUCATION/TRAINING PROGRAM

## 2018-12-12 RX ORDER — BETAMETHASONE SODIUM PHOSPHATE AND BETAMETHASONE ACETATE 3; 3 MG/ML; MG/ML
12 INJECTION, SUSPENSION INTRA-ARTICULAR; INTRALESIONAL; INTRAMUSCULAR; SOFT TISSUE DAILY
Status: COMPLETED | OUTPATIENT
Start: 2018-12-12 | End: 2018-12-13

## 2018-12-12 RX ADMIN — ACETAMINOPHEN 1000 MG: 500 TABLET ORAL at 08:27

## 2018-12-12 RX ADMIN — BETAMETHASONE SODIUM PHOSPHATE AND BETAMETHASONE ACETATE 12 MG: 3; 3 INJECTION, SUSPENSION INTRA-ARTICULAR; INTRALESIONAL; INTRAMUSCULAR at 08:27

## 2018-12-12 ASSESSMENT — PAIN SCALES - GENERAL: PAINLEVEL_OUTOF10: 0

## 2018-12-12 NOTE — DISCHARGE SUMMARY
discharge. EKG noted sinus tachycardia. Repeat CBC on 12/12 noted a WBC count of 11.3. MFM scan 12/12/18. Breech. Anterior placenta. ERLINDA 7.52cm. BPP 8/8. Normal dopplers         Discharge to: Home    Readmission planned: no     Recommendations on Discharge:     Medications:      Medication List      START taking these medications    metroNIDAZOLE 500 MG tablet  Commonly known as:  FLAGYL  Take 1 tablet by mouth 2 times daily for 7 days     ondansetron 4 MG disintegrating tablet  Commonly known as:  ZOFRAN-ODT  Take 1 tablet by mouth every 8 hours as needed for Nausea or Vomiting        CONTINUE taking these medications    * PROVENTIL  (90 Base) MCG/ACT inhaler  Generic drug:  albuterol sulfate HFA     * albuterol sulfate  (90 Base) MCG/ACT inhaler  Commonly known as:  PROVENTIL HFA  Inhale 2 puffs into the lungs every 6 hours as needed for Wheezing     aspirin 81 MG tablet     doxyLAMINE succinate 25 MG tablet  Commonly known as:  UNISOM  Take 0.5 tablets by mouth nightly     PRENATAL FORTE Tabs  Take 1 tablet by mouth Daily May  Substitute with any prenatal vit pt insurance will cover     Tiotropium Bromide-Olodaterol 2.5-2.5 MCG/ACT Aers     vitamin B-6 50 MG tablet  Commonly known as:  PYRIDOXINE  Take 0.5 tablets by mouth 3 times daily        * This list has 2 medication(s) that are the same as other medications prescribed for you. Read the directions carefully, and ask your doctor or other care provider to review them with you.                Where to Get Your Medications      You can get these medications from any pharmacy    Bring a paper prescription for each of these medications  · metroNIDAZOLE 500 MG tablet  · ondansetron 4 MG disintegrating tablet           Activity: no restrictions  Diet: regular diet  Follow up: as scheduled    Condition on discharge: good    Discharge date: 12/13/18    Mrakus Dumont DO  Ob/Gyn Resident

## 2018-12-13 VITALS
DIASTOLIC BLOOD PRESSURE: 70 MMHG | TEMPERATURE: 98.8 F | HEART RATE: 110 BPM | RESPIRATION RATE: 16 BRPM | SYSTOLIC BLOOD PRESSURE: 107 MMHG

## 2018-12-13 PROCEDURE — 96374 THER/PROPH/DIAG INJ IV PUSH: CPT

## 2018-12-13 PROCEDURE — 96361 HYDRATE IV INFUSION ADD-ON: CPT

## 2018-12-13 PROCEDURE — 96372 THER/PROPH/DIAG INJ SC/IM: CPT

## 2018-12-13 PROCEDURE — G0378 HOSPITAL OBSERVATION PER HR: HCPCS

## 2018-12-13 PROCEDURE — 99225 PR SBSQ OBSERVATION CARE/DAY 25 MINUTES: CPT | Performed by: OBSTETRICS & GYNECOLOGY

## 2018-12-13 PROCEDURE — 6360000002 HC RX W HCPCS: Performed by: OBSTETRICS & GYNECOLOGY

## 2018-12-13 PROCEDURE — 99215 OFFICE O/P EST HI 40 MIN: CPT

## 2018-12-13 PROCEDURE — 96360 HYDRATION IV INFUSION INIT: CPT

## 2018-12-13 RX ORDER — ONDANSETRON 4 MG/1
4 TABLET, ORALLY DISINTEGRATING ORAL EVERY 8 HOURS PRN
Qty: 18 TABLET | Refills: 1 | Status: SHIPPED | OUTPATIENT
Start: 2018-12-13 | End: 2019-07-23 | Stop reason: ALTCHOICE

## 2018-12-13 RX ADMIN — BETAMETHASONE SODIUM PHOSPHATE AND BETAMETHASONE ACETATE 12 MG: 3; 3 INJECTION, SUSPENSION INTRA-ARTICULAR; INTRALESIONAL; INTRAMUSCULAR at 09:01

## 2018-12-13 NOTE — PROGRESS NOTES
Pt able to drink water and keep it down
[id number]/0702-01  Admission Date/Time: 12/11/2018 12:28 PM        Attending Physician Statement  I have personally seen, evaluated and discussed the care of Stacey Kaufman, including pertinent history and exam findings with the resident. I have reviewed and edited their note in the electronic medical record. The key elements of all parts of the encounter have been performed/reviewed by me. I agree with the assessment, plan and orders as documented by the resident. The level of care submitted represents to the best of my ability the care documented in the medical record today. GC Modifier. This service has been performed in part by a resident under the direction of a teaching physician. Attending's Name:  Tim Pineda MD        Patient doing well this morning, reports her symptoms have resolved she is now tolerating a regular diet and requests discharge. Her leukocytosis has improved and FHR has been category 1. Recommended follow up as scheduled. Encouraged bland diet until she is feeling 100% and PO hydration. Reviewed when to return if she worsens or is not able to keep liquids down.
resident note. Will continue with current management. Pt to get MFM scan this afternoon because of the occasional prolonged decels - suspect this may be from the viral gastroenteritis and dehydration, but want to rule out any fetal/placental/cord causes. Will give pt steroids for lung maturity in case delivery is required. Pt states she is feeling better today and has been able to eat breakfast and lunch and keep food down. If MFM scan good and pt still doing well, will anticipate discharge home tomorrow morning.     [id number] [address] DO

## 2018-12-14 ENCOUNTER — HOSPITAL ENCOUNTER (OUTPATIENT)
Dept: LABOR AND DELIVERY | Age: 33
Discharge: HOME | End: 2018-12-14
Attending: OBSTETRICS & GYNECOLOGY | Admitting: OBSTETRICS & GYNECOLOGY
Payer: MEDICARE

## 2018-12-14 VITALS
DIASTOLIC BLOOD PRESSURE: 66 MMHG | HEART RATE: 113 BPM | SYSTOLIC BLOOD PRESSURE: 110 MMHG | RESPIRATION RATE: 16 BRPM | TEMPERATURE: 98.4 F

## 2018-12-14 PROCEDURE — 59025 FETAL NON-STRESS TEST: CPT

## 2018-12-14 PROCEDURE — 59025 FETAL NON-STRESS TEST: CPT | Performed by: OBSTETRICS & GYNECOLOGY

## 2018-12-14 NOTE — PROGRESS NOTES
considered reactive. Assessment/Plan:  1. Dora Hillman is a 35 y.o. female [id number] at 34w6d  2. NST Category 1, Reactive   - The patient will continue with her scheduled office appointments. - She will continue with her  testing as scheduled. - Signs and Symptoms of  labor precautions were reviewed. - She was counseled on adequate hydration prior to discharge   - The patient was instructed on fetal kick counts. 3. Dr. April Larios has reviewed this NST and agrees with the above stated assessment and plan.        Patricia Antunez DO  Ob/Gyn Resident   2018, 10:07 AM

## 2018-12-17 ENCOUNTER — ROUTINE PRENATAL (OUTPATIENT)
Dept: OBGYN | Age: 33
End: 2018-12-17
Payer: MEDICARE

## 2018-12-17 VITALS
WEIGHT: 239.3 LBS | HEART RATE: 107 BPM | BODY MASS INDEX: 46.74 KG/M2 | DIASTOLIC BLOOD PRESSURE: 79 MMHG | SYSTOLIC BLOOD PRESSURE: 120 MMHG

## 2018-12-17 DIAGNOSIS — O99.213 OBESITY AFFECTING PREGNANCY IN THIRD TRIMESTER: Primary | ICD-10-CM

## 2018-12-17 PROCEDURE — 99211 OFF/OP EST MAY X REQ PHY/QHP: CPT | Performed by: STUDENT IN AN ORGANIZED HEALTH CARE EDUCATION/TRAINING PROGRAM

## 2018-12-17 PROCEDURE — 59025 FETAL NON-STRESS TEST: CPT | Performed by: STUDENT IN AN ORGANIZED HEALTH CARE EDUCATION/TRAINING PROGRAM

## 2018-12-17 NOTE — PROGRESS NOTES
Attending Physician Statement  Fetal heart tracing reviewed. NST reactive. Continue twice weekly fetal testing. MFM 12/21/18, L/D 12/24/18. I have discussed the care of Peace Escalera, including pertinent history and exam findings,  with the resident. I have seen and examined the patient and the key elements of all parts of the encounter have been performed by me. I agree with the assessment, plan and orders as documented by the resident.   (GC Modifier)

## 2018-12-21 ENCOUNTER — ROUTINE PRENATAL (OUTPATIENT)
Dept: PERINATAL CARE | Age: 33
End: 2018-12-21
Payer: MEDICARE

## 2018-12-21 VITALS
RESPIRATION RATE: 16 BRPM | SYSTOLIC BLOOD PRESSURE: 115 MMHG | TEMPERATURE: 98.9 F | BODY MASS INDEX: 45.94 KG/M2 | HEART RATE: 117 BPM | DIASTOLIC BLOOD PRESSURE: 70 MMHG | HEIGHT: 60 IN | WEIGHT: 234 LBS

## 2018-12-21 DIAGNOSIS — O34.219 PREVIOUS CESAREAN DELIVERY, ANTEPARTUM CONDITION OR COMPLICATION: ICD-10-CM

## 2018-12-21 DIAGNOSIS — O36.8390 VARIABLE FETAL HEART RATE DECELERATIONS, ANTEPARTUM: ICD-10-CM

## 2018-12-21 DIAGNOSIS — Z3A.35 35 WEEKS GESTATION OF PREGNANCY: ICD-10-CM

## 2018-12-21 DIAGNOSIS — O41.93X0 ABNORMAL AMNIOTIC FLUID IN THIRD TRIMESTER, SINGLE OR UNSPECIFIED FETUS: Primary | ICD-10-CM

## 2018-12-21 DIAGNOSIS — Z36.4 ANTENATAL SCREENING FOR FETAL GROWTH RETARDATION USING ULTRASONICS: ICD-10-CM

## 2018-12-21 DIAGNOSIS — O99.213 OBESITY AFFECTING PREGNANCY IN THIRD TRIMESTER: ICD-10-CM

## 2018-12-21 PROCEDURE — 76818 FETAL BIOPHYS PROFILE W/NST: CPT | Performed by: OBSTETRICS & GYNECOLOGY

## 2018-12-21 PROCEDURE — 76821 MIDDLE CEREBRAL ARTERY ECHO: CPT | Performed by: OBSTETRICS & GYNECOLOGY

## 2018-12-21 PROCEDURE — 99211 OFF/OP EST MAY X REQ PHY/QHP: CPT | Performed by: OBSTETRICS & GYNECOLOGY

## 2018-12-21 PROCEDURE — 76820 UMBILICAL ARTERY ECHO: CPT | Performed by: OBSTETRICS & GYNECOLOGY

## 2018-12-21 PROCEDURE — G8427 DOCREV CUR MEDS BY ELIG CLIN: HCPCS | Performed by: OBSTETRICS & GYNECOLOGY

## 2018-12-21 PROCEDURE — 76816 OB US FOLLOW-UP PER FETUS: CPT | Performed by: OBSTETRICS & GYNECOLOGY

## 2018-12-21 PROCEDURE — G8417 CALC BMI ABV UP PARAM F/U: HCPCS | Performed by: OBSTETRICS & GYNECOLOGY

## 2018-12-24 ENCOUNTER — HOSPITAL ENCOUNTER (OUTPATIENT)
Dept: LABOR AND DELIVERY | Age: 33
Discharge: HOME | End: 2018-12-24
Attending: SPECIALIST | Admitting: SPECIALIST
Payer: MEDICARE

## 2018-12-24 VITALS
TEMPERATURE: 98.5 F | RESPIRATION RATE: 18 BRPM | HEART RATE: 118 BPM | DIASTOLIC BLOOD PRESSURE: 70 MMHG | SYSTOLIC BLOOD PRESSURE: 114 MMHG

## 2018-12-24 PROCEDURE — 59025 FETAL NON-STRESS TEST: CPT

## 2018-12-24 NOTE — PROGRESS NOTES
TESTING NOTE    Nita Edwards is a 35 y.o. female [id number] at 36w2d    The patient was seen and examined. She is here today for  testing for because she is at high risk for the following reasons: hx of C/S x1 (2/2 twin gestation), hx of indicated  delivery, hx of  x1 (G8), hx of blood transfusion, hx of SAB x4, grand multiparity, asthma (stable on long acting inhaler and albuterol PRN), sickle cell trait (patient reports her partner has normal Hgb electrophoresis), and BMI >45 (ASA 81mg daily). The baby is moving well and she denies any complaints. Patient denies any headache, visual changes, difficulty breathing, RUQ pain, N/V, F/C, and pain/swelling in lower extremities. Denies any dysuria or vaginal discharge.        Patient Active Problem List   Diagnosis    Asthma    BMI >45    History of  (G8)    Sickle cell trait     History of     Grand multipara    Hx of SAB x4    History of blood transfusion    Blood transfusion reaction    H/O Indicated PTD    Elevated 1hr GTT (normal 3hr)    Susceptible to varicella (non-immune), currently pregnant    Obesity affecting pregnancy in third trimester    High-risk pregnancy in third trimester    Abnormal amniotic fluid in third trimester    Rh+/RI/GBS unk    Abnormal weight gain    Chronic migraine without aura with status migrainosus, not intractable    Headache    Increased insulin level    Insomnia    Memory impairment    34 weeks gestation of pregnancy    s/p [place] ,     Nausea/vomiting in pregnancy    Abnormality in fetal heart rate/rhythm, antepartum condition or complication    Fetal heart rate decelerations affecting management of mother    High-risk pregnancy, third trimester    Variable fetal heart rate decelerations, antepartum       Vitals:  Vitals:    18 1010   BP: 114/70   Pulse: 118   Resp: 18   Temp: 98.5 °F (36.9 °C)   TempSrc: Oral         NST:   Fetal heart rate

## 2018-12-31 ENCOUNTER — ROUTINE PRENATAL (OUTPATIENT)
Dept: OBGYN | Age: 33
End: 2018-12-31
Payer: MEDICARE

## 2018-12-31 ENCOUNTER — HOSPITAL ENCOUNTER (OUTPATIENT)
Age: 33
Setting detail: SPECIMEN
Discharge: HOME | End: 2018-12-31
Payer: MEDICARE

## 2018-12-31 ENCOUNTER — HOSPITAL ENCOUNTER (OUTPATIENT)
Age: 33
Discharge: HOME | End: 2018-12-31
Attending: OBSTETRICS & GYNECOLOGY | Admitting: OBSTETRICS & GYNECOLOGY
Payer: MEDICARE

## 2018-12-31 VITALS
HEART RATE: 138 BPM | RESPIRATION RATE: 18 BRPM | DIASTOLIC BLOOD PRESSURE: 68 MMHG | TEMPERATURE: 98.2 F | SYSTOLIC BLOOD PRESSURE: 128 MMHG

## 2018-12-31 VITALS
SYSTOLIC BLOOD PRESSURE: 112 MMHG | DIASTOLIC BLOOD PRESSURE: 74 MMHG | HEART RATE: 108 BPM | BODY MASS INDEX: 47.26 KG/M2 | WEIGHT: 242 LBS

## 2018-12-31 DIAGNOSIS — O09.93 HRP (HIGH RISK PREGNANCY), THIRD TRIMESTER: Primary | ICD-10-CM

## 2018-12-31 DIAGNOSIS — O99.210 OBESITY COMPLICATING PREGNANCY, CHILDBIRTH, OR PUERPERIUM, ANTEPARTUM: ICD-10-CM

## 2018-12-31 DIAGNOSIS — O99.213 MATERNAL OBESITY SYNDROME, THIRD TRIMESTER: ICD-10-CM

## 2018-12-31 DIAGNOSIS — Z3A.37 37 WEEKS GESTATION OF PREGNANCY: ICD-10-CM

## 2018-12-31 PROCEDURE — 99212 OFFICE O/P EST SF 10 MIN: CPT | Performed by: OBSTETRICS & GYNECOLOGY

## 2018-12-31 PROCEDURE — 76819 FETAL BIOPHYS PROFIL W/O NST: CPT | Performed by: OBSTETRICS & GYNECOLOGY

## 2018-12-31 PROCEDURE — 59025 FETAL NON-STRESS TEST: CPT | Performed by: OBSTETRICS & GYNECOLOGY

## 2018-12-31 PROCEDURE — 99213 OFFICE O/P EST LOW 20 MIN: CPT | Performed by: OBSTETRICS & GYNECOLOGY

## 2018-12-31 PROCEDURE — 76818 FETAL BIOPHYS PROFILE W/NST: CPT

## 2019-01-03 LAB
CULTURE: NORMAL
Lab: NORMAL
SPECIMEN DESCRIPTION: NORMAL
STATUS: NORMAL

## 2019-01-04 ENCOUNTER — ROUTINE PRENATAL (OUTPATIENT)
Dept: PERINATAL CARE | Age: 34
End: 2019-01-04
Payer: MEDICARE

## 2019-01-04 VITALS
SYSTOLIC BLOOD PRESSURE: 112 MMHG | DIASTOLIC BLOOD PRESSURE: 80 MMHG | HEIGHT: 60 IN | BODY MASS INDEX: 47.32 KG/M2 | WEIGHT: 241 LBS | RESPIRATION RATE: 16 BRPM | HEART RATE: 104 BPM | TEMPERATURE: 98.3 F

## 2019-01-04 DIAGNOSIS — O41.93X0 ABNORMAL AMNIOTIC FLUID IN THIRD TRIMESTER, SINGLE OR UNSPECIFIED FETUS: Primary | ICD-10-CM

## 2019-01-04 DIAGNOSIS — O99.213 OBESITY AFFECTING PREGNANCY IN THIRD TRIMESTER: ICD-10-CM

## 2019-01-04 DIAGNOSIS — O99.810 ABNORMAL GLUCOSE TOLERANCE TEST IN PREGNANCY: ICD-10-CM

## 2019-01-04 DIAGNOSIS — Z3A.37 37 WEEKS GESTATION OF PREGNANCY: ICD-10-CM

## 2019-01-04 PROCEDURE — 76820 UMBILICAL ARTERY ECHO: CPT | Performed by: OBSTETRICS & GYNECOLOGY

## 2019-01-04 PROCEDURE — 76821 MIDDLE CEREBRAL ARTERY ECHO: CPT | Performed by: OBSTETRICS & GYNECOLOGY

## 2019-01-04 PROCEDURE — 76818 FETAL BIOPHYS PROFILE W/NST: CPT | Performed by: OBSTETRICS & GYNECOLOGY

## 2019-01-07 ENCOUNTER — ROUTINE PRENATAL (OUTPATIENT)
Dept: OBGYN | Age: 34
End: 2019-01-07
Payer: MEDICARE

## 2019-01-07 VITALS
SYSTOLIC BLOOD PRESSURE: 114 MMHG | DIASTOLIC BLOOD PRESSURE: 75 MMHG | BODY MASS INDEX: 47.3 KG/M2 | HEART RATE: 102 BPM | WEIGHT: 242.2 LBS

## 2019-01-07 DIAGNOSIS — O99.213 OBESITY AFFECTING PREGNANCY IN THIRD TRIMESTER: ICD-10-CM

## 2019-01-07 DIAGNOSIS — O09.93 HIGH-RISK PREGNANCY IN THIRD TRIMESTER: Primary | ICD-10-CM

## 2019-01-07 DIAGNOSIS — Z3A.38 38 WEEKS GESTATION OF PREGNANCY: ICD-10-CM

## 2019-01-07 PROBLEM — R51.9 HEADACHE: Status: RESOLVED | Noted: 2017-02-02 | Resolved: 2019-01-07

## 2019-01-07 PROBLEM — T80.92XA BLOOD TRANSFUSION REACTION: Status: RESOLVED | Noted: 2018-07-05 | Resolved: 2019-01-07

## 2019-01-07 PROBLEM — Z3A.34 34 WEEKS GESTATION OF PREGNANCY: Status: RESOLVED | Noted: 2018-12-12 | Resolved: 2019-01-07

## 2019-01-07 PROCEDURE — G8484 FLU IMMUNIZE NO ADMIN: HCPCS | Performed by: STUDENT IN AN ORGANIZED HEALTH CARE EDUCATION/TRAINING PROGRAM

## 2019-01-07 PROCEDURE — 99213 OFFICE O/P EST LOW 20 MIN: CPT | Performed by: STUDENT IN AN ORGANIZED HEALTH CARE EDUCATION/TRAINING PROGRAM

## 2019-01-07 PROCEDURE — 59025 FETAL NON-STRESS TEST: CPT | Performed by: STUDENT IN AN ORGANIZED HEALTH CARE EDUCATION/TRAINING PROGRAM

## 2019-01-07 PROCEDURE — G8417 CALC BMI ABV UP PARAM F/U: HCPCS | Performed by: STUDENT IN AN ORGANIZED HEALTH CARE EDUCATION/TRAINING PROGRAM

## 2019-01-07 PROCEDURE — 99212 OFFICE O/P EST SF 10 MIN: CPT | Performed by: STUDENT IN AN ORGANIZED HEALTH CARE EDUCATION/TRAINING PROGRAM

## 2019-01-07 PROCEDURE — 1036F TOBACCO NON-USER: CPT | Performed by: STUDENT IN AN ORGANIZED HEALTH CARE EDUCATION/TRAINING PROGRAM

## 2019-01-07 PROCEDURE — G8427 DOCREV CUR MEDS BY ELIG CLIN: HCPCS | Performed by: STUDENT IN AN ORGANIZED HEALTH CARE EDUCATION/TRAINING PROGRAM

## 2019-01-11 ENCOUNTER — HOSPITAL ENCOUNTER (INPATIENT)
Age: 34
LOS: 3 days | Discharge: HOME | DRG: 540 | End: 2019-01-14
Attending: OBSTETRICS & GYNECOLOGY | Admitting: OBSTETRICS & GYNECOLOGY
Payer: MEDICARE

## 2019-01-11 ENCOUNTER — ROUTINE PRENATAL (OUTPATIENT)
Dept: PERINATAL CARE | Age: 34
DRG: 540 | End: 2019-01-11
Payer: MEDICARE

## 2019-01-11 VITALS
DIASTOLIC BLOOD PRESSURE: 82 MMHG | HEART RATE: 102 BPM | TEMPERATURE: 98.3 F | SYSTOLIC BLOOD PRESSURE: 124 MMHG | BODY MASS INDEX: 47.71 KG/M2 | HEIGHT: 60 IN | RESPIRATION RATE: 18 BRPM | WEIGHT: 243 LBS

## 2019-01-11 DIAGNOSIS — O41.00X0 OLIGOHYDRAMNIOS, ANTEPARTUM, SINGLE OR UNSPECIFIED FETUS: Primary | ICD-10-CM

## 2019-01-11 DIAGNOSIS — Z36.4 ANTENATAL SCREENING FOR FETAL GROWTH RETARDATION USING ULTRASONICS: ICD-10-CM

## 2019-01-11 DIAGNOSIS — Z3A.38 38 WEEKS GESTATION OF PREGNANCY: ICD-10-CM

## 2019-01-11 DIAGNOSIS — O99.213 OBESITY AFFECTING PREGNANCY IN THIRD TRIMESTER: ICD-10-CM

## 2019-01-11 DIAGNOSIS — Z13.89 ENCOUNTER FOR ROUTINE SCREENING FOR MALFORMATION USING ULTRASONICS: ICD-10-CM

## 2019-01-11 LAB
ABO/RH: NORMAL
ABSOLUTE EOS #: 0.13 K/UL (ref 0–0.44)
ABSOLUTE IMMATURE GRANULOCYTE: 0.13 K/UL (ref 0–0.3)
ABSOLUTE LYMPH #: 2.75 K/UL (ref 1.1–3.7)
ABSOLUTE MONO #: 1.06 K/UL (ref 0.1–1.2)
AMPHETAMINE SCREEN URINE: NEGATIVE
ANTIBODY SCREEN: NEGATIVE
ARM BAND NUMBER: NORMAL
BARBITURATE SCREEN URINE: NEGATIVE
BASOPHILS # BLD: 1 % (ref 0–2)
BASOPHILS ABSOLUTE: 0.08 K/UL (ref 0–0.2)
BENZODIAZEPINE SCREEN, URINE: NEGATIVE
BILIRUBIN URINE: NEGATIVE
BUPRENORPHINE URINE: NORMAL
CANNABINOID SCREEN URINE: NEGATIVE
COCAINE METABOLITE, URINE: NEGATIVE
COLOR: YELLOW
COMMENT UA: NORMAL
DIFFERENTIAL TYPE: ABNORMAL
EOSINOPHILS RELATIVE PERCENT: 1 % (ref 1–4)
EXPIRATION DATE: NORMAL
GLUCOSE URINE: NEGATIVE
HCT VFR BLD CALC: 33.5 % (ref 36.3–47.1)
HEMOGLOBIN: 10.4 G/DL (ref 11.9–15.1)
IMMATURE GRANULOCYTES: 1 %
KETONES, URINE: NEGATIVE
LEUKOCYTE ESTERASE, URINE: NEGATIVE
LYMPHOCYTES # BLD: 16 % (ref 24–43)
MCH RBC QN AUTO: 22.4 PG (ref 25.2–33.5)
MCHC RBC AUTO-ENTMCNC: 31 G/DL (ref 28.4–34.8)
MCV RBC AUTO: 72 FL (ref 82.6–102.9)
MDMA URINE: NORMAL
METHADONE SCREEN, URINE: NEGATIVE
METHAMPHETAMINE, URINE: NORMAL
MONOCYTES # BLD: 6 % (ref 3–12)
NITRITE, URINE: NEGATIVE
NRBC AUTOMATED: 0 PER 100 WBC
OPIATES, URINE: NEGATIVE
OXYCODONE SCREEN URINE: NEGATIVE
PDW BLD-RTO: 16.6 % (ref 11.8–14.4)
PH UA: 5 (ref 5–8)
PHENCYCLIDINE, URINE: NEGATIVE
PLATELET # BLD: 355 K/UL (ref 138–453)
PLATELET ESTIMATE: ABNORMAL
PMV BLD AUTO: 10.9 FL (ref 8.1–13.5)
PROPOXYPHENE, URINE: NORMAL
PROTEIN UA: NEGATIVE
RBC # BLD: 4.65 M/UL (ref 3.95–5.11)
RBC # BLD: ABNORMAL 10*6/UL
SEG NEUTROPHILS: 75 % (ref 36–65)
SEGMENTED NEUTROPHILS ABSOLUTE COUNT: 13.15 K/UL (ref 1.5–8.1)
SPECIFIC GRAVITY UA: 1.01 (ref 1–1.03)
T. PALLIDUM, IGG: NONREACTIVE
TEST INFORMATION: NORMAL
TRICYCLIC ANTIDEPRESSANTS, UR: NORMAL
TURBIDITY: CLEAR
URINE HGB: NEGATIVE
UROBILINOGEN, URINE: NORMAL
WBC # BLD: 17.3 K/UL (ref 3.5–11.3)
WBC # BLD: ABNORMAL 10*3/UL

## 2019-01-11 PROCEDURE — 76821 MIDDLE CEREBRAL ARTERY ECHO: CPT | Performed by: OBSTETRICS & GYNECOLOGY

## 2019-01-11 PROCEDURE — G8427 DOCREV CUR MEDS BY ELIG CLIN: HCPCS | Performed by: OBSTETRICS & GYNECOLOGY

## 2019-01-11 PROCEDURE — 80307 DRUG TEST PRSMV CHEM ANLYZR: CPT

## 2019-01-11 PROCEDURE — 36415 COLL VENOUS BLD VENIPUNCTURE: CPT

## 2019-01-11 PROCEDURE — 99223 1ST HOSP IP/OBS HIGH 75: CPT | Performed by: OBSTETRICS & GYNECOLOGY

## 2019-01-11 PROCEDURE — 86900 BLOOD TYPING SEROLOGIC ABO: CPT

## 2019-01-11 PROCEDURE — 3E033VJ INTRODUCTION OF OTHER HORMONE INTO PERIPHERAL VEIN, PERCUTANEOUS APPROACH: ICD-10-PCS | Performed by: OBSTETRICS & GYNECOLOGY

## 2019-01-11 PROCEDURE — 2580000003 HC RX 258: Performed by: STUDENT IN AN ORGANIZED HEALTH CARE EDUCATION/TRAINING PROGRAM

## 2019-01-11 PROCEDURE — 81003 URINALYSIS AUTO W/O SCOPE: CPT

## 2019-01-11 PROCEDURE — 1220000000 HC SEMI PRIVATE OB R&B

## 2019-01-11 PROCEDURE — 86780 TREPONEMA PALLIDUM: CPT

## 2019-01-11 PROCEDURE — 85025 COMPLETE CBC W/AUTO DIFF WBC: CPT

## 2019-01-11 PROCEDURE — 86901 BLOOD TYPING SEROLOGIC RH(D): CPT

## 2019-01-11 PROCEDURE — 86850 RBC ANTIBODY SCREEN: CPT

## 2019-01-11 PROCEDURE — 76820 UMBILICAL ARTERY ECHO: CPT | Performed by: OBSTETRICS & GYNECOLOGY

## 2019-01-11 PROCEDURE — 99211 OFF/OP EST MAY X REQ PHY/QHP: CPT | Performed by: OBSTETRICS & GYNECOLOGY

## 2019-01-11 PROCEDURE — 6360000002 HC RX W HCPCS: Performed by: STUDENT IN AN ORGANIZED HEALTH CARE EDUCATION/TRAINING PROGRAM

## 2019-01-11 PROCEDURE — 76805 OB US >/= 14 WKS SNGL FETUS: CPT | Performed by: OBSTETRICS & GYNECOLOGY

## 2019-01-11 PROCEDURE — G8417 CALC BMI ABV UP PARAM F/U: HCPCS | Performed by: OBSTETRICS & GYNECOLOGY

## 2019-01-11 PROCEDURE — 76818 FETAL BIOPHYS PROFILE W/NST: CPT | Performed by: OBSTETRICS & GYNECOLOGY

## 2019-01-11 RX ORDER — SEVOFLURANE 250 ML/250ML
1 LIQUID RESPIRATORY (INHALATION) CONTINUOUS PRN
Status: DISCONTINUED | OUTPATIENT
Start: 2019-01-11 | End: 2019-01-12

## 2019-01-11 RX ORDER — DIPHENHYDRAMINE HCL 25 MG
25 TABLET ORAL EVERY 4 HOURS PRN
Status: DISCONTINUED | OUTPATIENT
Start: 2019-01-11 | End: 2019-01-12 | Stop reason: HOSPADM

## 2019-01-11 RX ORDER — ONDANSETRON 2 MG/ML
4 INJECTION INTRAMUSCULAR; INTRAVENOUS EVERY 6 HOURS PRN
Status: DISCONTINUED | OUTPATIENT
Start: 2019-01-11 | End: 2019-01-12 | Stop reason: HOSPADM

## 2019-01-11 RX ORDER — SODIUM CHLORIDE, SODIUM LACTATE, POTASSIUM CHLORIDE, CALCIUM CHLORIDE 600; 310; 30; 20 MG/100ML; MG/100ML; MG/100ML; MG/100ML
INJECTION, SOLUTION INTRAVENOUS CONTINUOUS
Status: DISCONTINUED | OUTPATIENT
Start: 2019-01-11 | End: 2019-01-12

## 2019-01-11 RX ORDER — ACETAMINOPHEN 500 MG
1000 TABLET ORAL EVERY 6 HOURS PRN
Status: DISCONTINUED | OUTPATIENT
Start: 2019-01-11 | End: 2019-01-12 | Stop reason: HOSPADM

## 2019-01-11 RX ADMIN — Medication 1 MILLI-UNITS/MIN: at 22:06

## 2019-01-11 RX ADMIN — SODIUM CHLORIDE, POTASSIUM CHLORIDE, SODIUM LACTATE AND CALCIUM CHLORIDE: 600; 310; 30; 20 INJECTION, SOLUTION INTRAVENOUS at 18:46

## 2019-01-12 PROBLEM — O34.219 VBAC (VAGINAL BIRTH AFTER CESAREAN): Status: ACTIVE | Noted: 2019-01-12

## 2019-01-12 PROCEDURE — 96366 THER/PROPH/DIAG IV INF ADDON: CPT

## 2019-01-12 PROCEDURE — 6370000000 HC RX 637 (ALT 250 FOR IP): Performed by: OBSTETRICS & GYNECOLOGY

## 2019-01-12 PROCEDURE — 2580000003 HC RX 258: Performed by: STUDENT IN AN ORGANIZED HEALTH CARE EDUCATION/TRAINING PROGRAM

## 2019-01-12 PROCEDURE — 88307 TISSUE EXAM BY PATHOLOGIST: CPT

## 2019-01-12 PROCEDURE — 59409 OBSTETRICAL CARE: CPT | Performed by: SPECIALIST

## 2019-01-12 PROCEDURE — 1220000000 HC SEMI PRIVATE OB R&B

## 2019-01-12 PROCEDURE — 6370000000 HC RX 637 (ALT 250 FOR IP): Performed by: STUDENT IN AN ORGANIZED HEALTH CARE EDUCATION/TRAINING PROGRAM

## 2019-01-12 PROCEDURE — 7200000001 HC VAGINAL DELIVERY

## 2019-01-12 PROCEDURE — 96365 THER/PROPH/DIAG IV INF INIT: CPT

## 2019-01-12 RX ORDER — SODIUM CHLORIDE 0.9 % (FLUSH) 0.9 %
10 SYRINGE (ML) INJECTION EVERY 12 HOURS SCHEDULED
Status: DISCONTINUED | OUTPATIENT
Start: 2019-01-12 | End: 2019-01-14 | Stop reason: HOSPADM

## 2019-01-12 RX ORDER — DIPHENHYDRAMINE HCL 25 MG
25 TABLET ORAL EVERY 6 HOURS PRN
Status: DISCONTINUED | OUTPATIENT
Start: 2019-01-12 | End: 2019-01-14 | Stop reason: HOSPADM

## 2019-01-12 RX ORDER — SODIUM CHLORIDE 0.9 % (FLUSH) 0.9 %
10 SYRINGE (ML) INJECTION PRN
Status: DISCONTINUED | OUTPATIENT
Start: 2019-01-12 | End: 2019-01-14 | Stop reason: HOSPADM

## 2019-01-12 RX ORDER — BISACODYL 10 MG
10 SUPPOSITORY, RECTAL RECTAL DAILY PRN
Status: DISCONTINUED | OUTPATIENT
Start: 2019-01-12 | End: 2019-01-14 | Stop reason: HOSPADM

## 2019-01-12 RX ORDER — ACETAMINOPHEN 500 MG
1000 TABLET ORAL EVERY 6 HOURS PRN
Status: DISCONTINUED | OUTPATIENT
Start: 2019-01-12 | End: 2019-01-14 | Stop reason: HOSPADM

## 2019-01-12 RX ORDER — SODIUM CHLORIDE, SODIUM LACTATE, POTASSIUM CHLORIDE, CALCIUM CHLORIDE 600; 310; 30; 20 MG/100ML; MG/100ML; MG/100ML; MG/100ML
INJECTION, SOLUTION INTRAVENOUS CONTINUOUS
Status: DISCONTINUED | OUTPATIENT
Start: 2019-01-12 | End: 2019-01-14 | Stop reason: HOSPADM

## 2019-01-12 RX ORDER — DOCUSATE SODIUM 100 MG/1
100 CAPSULE, LIQUID FILLED ORAL 2 TIMES DAILY
Status: DISCONTINUED | OUTPATIENT
Start: 2019-01-12 | End: 2019-01-14 | Stop reason: HOSPADM

## 2019-01-12 RX ORDER — ONDANSETRON 4 MG/1
8 TABLET, FILM COATED ORAL EVERY 8 HOURS PRN
Status: DISCONTINUED | OUTPATIENT
Start: 2019-01-12 | End: 2019-01-14 | Stop reason: HOSPADM

## 2019-01-12 RX ORDER — CALCIUM CARBONATE 200(500)MG
1000 TABLET,CHEWABLE ORAL 3 TIMES DAILY PRN
Status: DISCONTINUED | OUTPATIENT
Start: 2019-01-12 | End: 2019-01-14 | Stop reason: HOSPADM

## 2019-01-12 RX ORDER — SIMETHICONE 80 MG
80 TABLET,CHEWABLE ORAL EVERY 6 HOURS PRN
Status: DISCONTINUED | OUTPATIENT
Start: 2019-01-12 | End: 2019-01-14 | Stop reason: HOSPADM

## 2019-01-12 RX ORDER — FAMOTIDINE 20 MG/1
20 TABLET, FILM COATED ORAL 2 TIMES DAILY PRN
Status: DISCONTINUED | OUTPATIENT
Start: 2019-01-12 | End: 2019-01-14 | Stop reason: HOSPADM

## 2019-01-12 RX ORDER — ALBUTEROL SULFATE 90 UG/1
2 AEROSOL, METERED RESPIRATORY (INHALATION) EVERY 6 HOURS PRN
Status: DISCONTINUED | OUTPATIENT
Start: 2019-01-12 | End: 2019-01-14 | Stop reason: HOSPADM

## 2019-01-12 RX ORDER — LANOLIN 100 %
OINTMENT (GRAM) TOPICAL PRN
Status: DISCONTINUED | OUTPATIENT
Start: 2019-01-12 | End: 2019-01-14 | Stop reason: HOSPADM

## 2019-01-12 RX ORDER — IBUPROFEN 800 MG/1
800 TABLET ORAL EVERY 8 HOURS
Status: DISCONTINUED | OUTPATIENT
Start: 2019-01-12 | End: 2019-01-14 | Stop reason: HOSPADM

## 2019-01-12 RX ADMIN — ACETAMINOPHEN 1000 MG: 500 TABLET ORAL at 17:07

## 2019-01-12 RX ADMIN — IBUPROFEN 800 MG: 800 TABLET, FILM COATED ORAL at 10:46

## 2019-01-12 RX ADMIN — IBUPROFEN 800 MG: 800 TABLET, FILM COATED ORAL at 20:03

## 2019-01-12 RX ADMIN — SODIUM CHLORIDE, POTASSIUM CHLORIDE, SODIUM LACTATE AND CALCIUM CHLORIDE: 600; 310; 30; 20 INJECTION, SOLUTION INTRAVENOUS at 02:09

## 2019-01-12 RX ADMIN — DIPHENHYDRAMINE HCL 25 MG: 25 TABLET ORAL at 00:56

## 2019-01-12 RX ADMIN — DOCUSATE SODIUM 100 MG: 100 CAPSULE, LIQUID FILLED ORAL at 20:03

## 2019-01-12 RX ADMIN — ACETAMINOPHEN 1000 MG: 500 TABLET ORAL at 10:49

## 2019-01-12 RX ADMIN — ACETAMINOPHEN 1000 MG: 500 TABLET ORAL at 00:56

## 2019-01-12 ASSESSMENT — PAIN DESCRIPTION - DESCRIPTORS: DESCRIPTORS: CRAMPING;DISCOMFORT

## 2019-01-12 ASSESSMENT — PAIN SCALES - GENERAL
PAINLEVEL_OUTOF10: 7
PAINLEVEL_OUTOF10: 4
PAINLEVEL_OUTOF10: 8
PAINLEVEL_OUTOF10: 6
PAINLEVEL_OUTOF10: 4
PAINLEVEL_OUTOF10: 8

## 2019-01-12 ASSESSMENT — PAIN DESCRIPTION - ORIENTATION: ORIENTATION: MID

## 2019-01-12 ASSESSMENT — PAIN DESCRIPTION - PAIN TYPE: TYPE: ACUTE PAIN

## 2019-01-12 ASSESSMENT — PAIN DESCRIPTION - FREQUENCY: FREQUENCY: INTERMITTENT

## 2019-01-12 ASSESSMENT — PAIN DESCRIPTION - LOCATION: LOCATION: ABDOMEN

## 2019-01-13 PROCEDURE — 6370000000 HC RX 637 (ALT 250 FOR IP): Performed by: OBSTETRICS & GYNECOLOGY

## 2019-01-13 PROCEDURE — 1220000000 HC SEMI PRIVATE OB R&B

## 2019-01-13 RX ORDER — PSEUDOEPHEDRINE HCL 30 MG
100 TABLET ORAL 2 TIMES DAILY PRN
Qty: 60 CAPSULE | Refills: 0 | Status: SHIPPED | OUTPATIENT
Start: 2019-01-13 | End: 2019-02-19 | Stop reason: ALTCHOICE

## 2019-01-13 RX ORDER — IBUPROFEN 800 MG/1
800 TABLET ORAL EVERY 8 HOURS PRN
Qty: 30 TABLET | Refills: 0 | Status: SHIPPED | OUTPATIENT
Start: 2019-01-13 | End: 2019-02-06 | Stop reason: SDUPTHER

## 2019-01-13 RX ORDER — MEDROXYPROGESTERONE ACETATE 150 MG/ML
150 INJECTION, SUSPENSION INTRAMUSCULAR ONCE
Status: COMPLETED | OUTPATIENT
Start: 2019-01-13 | End: 2019-01-14

## 2019-01-13 RX ADMIN — ACETAMINOPHEN 1000 MG: 500 TABLET ORAL at 16:20

## 2019-01-13 RX ADMIN — ACETAMINOPHEN 1000 MG: 500 TABLET ORAL at 02:23

## 2019-01-13 RX ADMIN — IBUPROFEN 800 MG: 800 TABLET, FILM COATED ORAL at 13:06

## 2019-01-13 RX ADMIN — IBUPROFEN 800 MG: 800 TABLET, FILM COATED ORAL at 21:08

## 2019-01-13 RX ADMIN — IBUPROFEN 800 MG: 800 TABLET, FILM COATED ORAL at 05:41

## 2019-01-13 RX ADMIN — ACETAMINOPHEN 1000 MG: 500 TABLET ORAL at 10:38

## 2019-01-13 RX ADMIN — DOCUSATE SODIUM 100 MG: 100 CAPSULE, LIQUID FILLED ORAL at 21:08

## 2019-01-13 RX ADMIN — DOCUSATE SODIUM 100 MG: 100 CAPSULE, LIQUID FILLED ORAL at 10:38

## 2019-01-13 RX ADMIN — ACETAMINOPHEN 1000 MG: 500 TABLET ORAL at 22:26

## 2019-01-13 ASSESSMENT — PAIN SCALES - GENERAL
PAINLEVEL_OUTOF10: 5
PAINLEVEL_OUTOF10: 7
PAINLEVEL_OUTOF10: 6
PAINLEVEL_OUTOF10: 8
PAINLEVEL_OUTOF10: 6
PAINLEVEL_OUTOF10: 5
PAINLEVEL_OUTOF10: 5

## 2019-01-13 ASSESSMENT — PAIN DESCRIPTION - DESCRIPTORS: DESCRIPTORS: CRAMPING

## 2019-01-13 ASSESSMENT — PAIN DESCRIPTION - PAIN TYPE: TYPE: ACUTE PAIN

## 2019-01-13 ASSESSMENT — PAIN DESCRIPTION - LOCATION: LOCATION: ABDOMEN

## 2019-01-14 VITALS
SYSTOLIC BLOOD PRESSURE: 125 MMHG | TEMPERATURE: 98.6 F | HEART RATE: 98 BPM | DIASTOLIC BLOOD PRESSURE: 72 MMHG | RESPIRATION RATE: 20 BRPM | OXYGEN SATURATION: 99 % | WEIGHT: 243 LBS | HEIGHT: 60 IN | BODY MASS INDEX: 47.71 KG/M2

## 2019-01-14 PROCEDURE — 6360000002 HC RX W HCPCS: Performed by: STUDENT IN AN ORGANIZED HEALTH CARE EDUCATION/TRAINING PROGRAM

## 2019-01-14 PROCEDURE — 6370000000 HC RX 637 (ALT 250 FOR IP): Performed by: OBSTETRICS & GYNECOLOGY

## 2019-01-14 RX ADMIN — IBUPROFEN 800 MG: 800 TABLET, FILM COATED ORAL at 04:53

## 2019-01-14 RX ADMIN — MEDROXYPROGESTERONE ACETATE 150 MG: 150 INJECTION, SUSPENSION INTRAMUSCULAR at 09:01

## 2019-01-14 RX ADMIN — ACETAMINOPHEN 1000 MG: 500 TABLET ORAL at 04:53

## 2019-01-14 RX ADMIN — DOCUSATE SODIUM 100 MG: 100 CAPSULE, LIQUID FILLED ORAL at 09:01

## 2019-01-14 ASSESSMENT — PAIN SCALES - GENERAL: PAINLEVEL_OUTOF10: 5

## 2019-01-14 ASSESSMENT — PAIN DESCRIPTION - DESCRIPTORS: DESCRIPTORS: CRAMPING

## 2019-01-14 ASSESSMENT — PAIN DESCRIPTION - LOCATION: LOCATION: ABDOMEN

## 2019-01-14 ASSESSMENT — PAIN DESCRIPTION - PAIN TYPE: TYPE: ACUTE PAIN

## 2019-01-15 LAB — SURGICAL PATHOLOGY REPORT: NORMAL

## 2019-02-05 ENCOUNTER — POSTPARTUM VISIT (OUTPATIENT)
Dept: OBGYN | Age: 34
End: 2019-02-05
Payer: MEDICARE

## 2019-02-05 ENCOUNTER — TELEPHONE (OUTPATIENT)
Dept: OBGYN | Age: 34
End: 2019-02-05

## 2019-02-05 VITALS
RESPIRATION RATE: 16 BRPM | HEART RATE: 89 BPM | WEIGHT: 225.2 LBS | DIASTOLIC BLOOD PRESSURE: 92 MMHG | BODY MASS INDEX: 44.21 KG/M2 | TEMPERATURE: 98.5 F | SYSTOLIC BLOOD PRESSURE: 142 MMHG | HEIGHT: 60 IN

## 2019-02-07 RX ORDER — IBUPROFEN 800 MG/1
TABLET ORAL
Qty: 20 TABLET | Refills: 0 | Status: SHIPPED | OUTPATIENT
Start: 2019-02-07 | End: 2020-09-16 | Stop reason: ALTCHOICE

## 2019-02-19 ENCOUNTER — OFFICE VISIT (OUTPATIENT)
Dept: OBGYN | Age: 34
End: 2019-02-19
Payer: MEDICARE

## 2019-02-19 VITALS
BODY MASS INDEX: 43.19 KG/M2 | HEART RATE: 81 BPM | HEIGHT: 60 IN | SYSTOLIC BLOOD PRESSURE: 120 MMHG | WEIGHT: 220 LBS | DIASTOLIC BLOOD PRESSURE: 87 MMHG

## 2019-02-19 DIAGNOSIS — Z01.30 BP CHECK: ICD-10-CM

## 2019-02-19 PROCEDURE — 99212 OFFICE O/P EST SF 10 MIN: CPT | Performed by: OBSTETRICS & GYNECOLOGY

## 2019-02-19 RX ORDER — MEDROXYPROGESTERONE ACETATE 150 MG/ML
150 INJECTION, SUSPENSION INTRAMUSCULAR
Qty: 1 ML | Refills: 3 | Status: SHIPPED | OUTPATIENT
Start: 2019-02-19 | End: 2019-07-23 | Stop reason: SDUPTHER

## 2019-04-04 ENCOUNTER — NURSE ONLY (OUTPATIENT)
Dept: OBGYN | Age: 34
End: 2019-04-04
Payer: MEDICARE

## 2019-04-04 VITALS — TEMPERATURE: 97.7 F | BODY MASS INDEX: 43.45 KG/M2 | HEIGHT: 60 IN | WEIGHT: 221.3 LBS

## 2019-04-04 DIAGNOSIS — Z30.09 FAMILY PLANNING: Primary | ICD-10-CM

## 2019-04-04 PROCEDURE — 99211 OFF/OP EST MAY X REQ PHY/QHP: CPT | Performed by: OBSTETRICS & GYNECOLOGY

## 2019-04-04 PROCEDURE — 96372 THER/PROPH/DIAG INJ SC/IM: CPT | Performed by: OBSTETRICS & GYNECOLOGY

## 2019-04-04 RX ORDER — MEDROXYPROGESTERONE ACETATE 150 MG/ML
150 INJECTION, SUSPENSION INTRAMUSCULAR ONCE
Status: COMPLETED | OUTPATIENT
Start: 2019-04-04 | End: 2019-04-04

## 2019-04-04 RX ADMIN — MEDROXYPROGESTERONE ACETATE 150 MG: 150 INJECTION, SUSPENSION, EXTENDED RELEASE INTRAMUSCULAR at 11:45

## 2019-04-04 NOTE — PROGRESS NOTES
Patient presents to office for Depo Provera injection. Per doctor's orders of Depo Provera 150mg given IM, Right Deltoid, patient tolerated well. Patient advised to return in 11-12 weeks for next injection.

## 2019-07-23 ENCOUNTER — HOSPITAL ENCOUNTER (OUTPATIENT)
Age: 34
Setting detail: SPECIMEN
Discharge: HOME | End: 2019-07-23
Payer: MEDICARE

## 2019-07-23 ENCOUNTER — OFFICE VISIT (OUTPATIENT)
Dept: OBGYN | Age: 34
End: 2019-07-23
Payer: MEDICARE

## 2019-07-23 VITALS
BODY MASS INDEX: 44.06 KG/M2 | WEIGHT: 224.44 LBS | SYSTOLIC BLOOD PRESSURE: 116 MMHG | DIASTOLIC BLOOD PRESSURE: 84 MMHG | HEART RATE: 81 BPM | HEIGHT: 60 IN

## 2019-07-23 DIAGNOSIS — R30.0 DYSURIA: ICD-10-CM

## 2019-07-23 DIAGNOSIS — N39.3 SUI (STRESS URINARY INCONTINENCE, FEMALE): ICD-10-CM

## 2019-07-23 DIAGNOSIS — Z30.09 FAMILY PLANNING: ICD-10-CM

## 2019-07-23 DIAGNOSIS — E01.0 THYROMEGALY: ICD-10-CM

## 2019-07-23 DIAGNOSIS — K59.01 SLOW TRANSIT CONSTIPATION: ICD-10-CM

## 2019-07-23 DIAGNOSIS — Z01.419 WOMEN'S ANNUAL ROUTINE GYNECOLOGICAL EXAMINATION: Primary | ICD-10-CM

## 2019-07-23 LAB
BILIRUBIN URINE: NEGATIVE
COLOR: YELLOW
COMMENT UA: NORMAL
GLUCOSE URINE: NEGATIVE
KETONES, URINE: NEGATIVE
LEUKOCYTE ESTERASE, URINE: NEGATIVE
NITRITE, URINE: NEGATIVE
PH UA: 5.5 (ref 5–8)
PROTEIN UA: NEGATIVE
SPECIFIC GRAVITY UA: 1.01 (ref 1–1.03)
TSH SERPL DL<=0.05 MIU/L-ACNC: 1.98 MIU/L (ref 0.3–5)
TURBIDITY: CLEAR
URINE HGB: NEGATIVE
UROBILINOGEN, URINE: NORMAL

## 2019-07-23 PROCEDURE — 36415 COLL VENOUS BLD VENIPUNCTURE: CPT

## 2019-07-23 PROCEDURE — 84443 ASSAY THYROID STIM HORMONE: CPT

## 2019-07-23 PROCEDURE — 99395 PREV VISIT EST AGE 18-39: CPT | Performed by: OBSTETRICS & GYNECOLOGY

## 2019-07-23 PROCEDURE — 81003 URINALYSIS AUTO W/O SCOPE: CPT

## 2019-07-23 RX ORDER — MEDROXYPROGESTERONE ACETATE 150 MG/ML
150 INJECTION, SUSPENSION INTRAMUSCULAR
Qty: 1 ML | Refills: 3 | Status: SHIPPED | OUTPATIENT
Start: 2019-07-23 | End: 2020-03-06 | Stop reason: SDUPTHER

## 2019-07-23 RX ORDER — DOCUSATE SODIUM 100 MG/1
100 CAPSULE, LIQUID FILLED ORAL DAILY PRN
Qty: 30 CAPSULE | Refills: 0 | Status: SHIPPED | OUTPATIENT
Start: 2019-07-23 | End: 2020-09-16 | Stop reason: ALTCHOICE

## 2019-07-23 NOTE — PROGRESS NOTES
Griselda Ibanez  2019              35 y.o. Chief Complaint   Patient presents with    Gynecologic Exam     pap 2018 wnl hpv neg       No LMP recorded. Patient has had an injection. Primary Care Physician: PHYSICIAN, NON-STAFF    The patient was seen and examined. She has no chief complaint today and is here for her annual exam.  Her bowels are regular. There are  voiding complaints. She denies any bloating. She denies vaginal discharge and was counseled on STD's and the need for barrier contraception. HPI : Griselda Ibanez is a 35 y.o. female [id number] pt islate for depo provera. Last inj 19, more than 15 weeks ago. She is wishing to continue Depo provera but admits to unprotected intercourse yesterday. She reports cramping x 3 days, there is nausea. There is some constipation, admits to BM at least once daily. There is pain with voiding, denies urgency or frequency. She admits to FERNANDA that has become worse since her last delivery 19. The pt is breast feeding and that is going well.  Denies breast pain.        ________________________________________________________________________  OB History    Para Term  AB Living   9 4 3 1 5 5   SAB TAB Ectopic Molar Multiple Live Births   4 1 0 0 1 5      # Outcome Date GA Lbr Bairon/2nd Weight Sex Delivery Anes PTL Lv   9 Term 19 39w0d 00:22 / 00:02 6 lb 10.2 oz (3.01 kg) M  OTHER N SUN      Name: Napolean Lieu: 8  Apgar5: 9   8 Term 14 40w2d 04:32 6 lb 8 oz (2.948 kg) F   N SUN      Name: Sharona Ned: 9  Apgar5: 9   7 TAB               Birth Comments: no D&C   6 SAB            5A  10/19/08 36w0d  5 lb 4 oz (2.381 kg) M CS-LTranv   SUN      Birth Comments: twins - malpresentation, spontaneous labor   5B  10/19/08   4 lb 8 oz (2.041 kg) F CS-LTranv   SUN   4 Term 07 39w0d  5 lb 10 oz (2.551 kg) F Vag-Spont   SUN   3 2004           2 2004 Talks on phone: Not on file     Gets together: Not on file     Attends L[employer] day service: Not on file     Active member of club or organization: Not on file     Attends meetings of clubs or organizations: Not on file     Relationship status: Not on file    Intimate partner violence:     Fear of current or ex partner: Not on file     Emotionally abused: Not on file     Physically abused: Not on file     Forced sexual activity: Not on file   Other Topics Concern    Not on file   Social History Narrative    Not on file       MEDICATIONS:  Current Outpatient Medications on File Prior to Visit   Medication Sig Dispense Refill    medroxyPROGESTERone (DEPO-PROVERA) 150 MG/ML injection Inject 1 mL into the muscle every 3 months 1 mL 3    ibuprofen (ADVIL;MOTRIN) 800 MG tablet TAKE 1 TAB BY MOUTH EVERY 8 HOURS AS NEEDED FOR PAIN 20 tablet 0    albuterol sulfate HFA (PROVENTIL HFA) 108 (90 Base) MCG/ACT inhaler Inhale 2 puffs into the lungs every 6 hours as needed for Wheezing 1 Inhaler 3    Prenatal Multivit-Min-Fe-FA (PRENATAL FORTE) TABS Take 1 tablet by mouth Daily May  Substitute with any prenatal vit pt insurance will cover 30 tablet 12     No current facility-administered medications on file prior to visit. ALLERGIES:  Allergies as of 2019    (No Known Allergies)     Immunization status: up to date. Gardasil missing      Gynecologic History:  Menarche: 7 yo       No LMP recorded. Patient has had an injection. Sexually Active: Yes    STD History: Yes , GC, Chlamydia , trichomonas    Permanent Sterilization: No   Reversible Birth Control: Yes , depo provera        Genetic Qualified Family History of Breast, Ovarian , Colon or Uterine Cancer: Yes , maternal aunt ovarian cancer  age age 62. The patient's mother was never tested for BRCA. If YES see scanned worksheet.     Preventative Health Testing:  Date of Last Pap Smear: 2018 negative pap , negative HPV  Abnormal Pap Smear

## 2019-07-23 NOTE — PATIENT INSTRUCTIONS
HPV vaccine, but the vaccine can be given from age 5 to 32. Children ages 5 to 15 get the vaccine in a series of two shots over 6 months. Children age 13 years and older get the vaccine as a three-dose series. For the vaccine to work best, all shots in the series must be given. What else do you need to know? The best time for a person to get the vaccine is before becoming sexually active. This is because the vaccine works best before there is any chance of infection with HPV. When the vaccine is given at this time, it can prevent almost all infection by the types of HPV the vaccine guards against. If the person has already been infected with the virus, the vaccine does not provide protection against the virus. Having the HPV vaccine does not change your need for Pap tests. Women who have had the HPV vaccine should follow the same Pap test schedule as women who have not had the vaccine. If you are a parent of a child who's getting the shot, talk to your child about HPV and the vaccine. It's a chance to teach your child about safer sex and STIs. Having your child get the shot doesn't mean you're giving your child permission to have sex. The vaccine can have side effects. Common side effects from the vaccine include headache, fever, and redness or swelling at the site of the shot. More serious side effects, such as fainting, are rare. · Take an over-the-counter pain medicine, such as acetaminophen (Tylenol) or ibuprofen (Advil, Motrin), to relieve common side effects. Read and follow all instructions on the label. · Put ice or a cold pack on the sore area for 10 to 20 minutes at a time. Put a thin cloth between the ice and your skin. Where can you learn more? Go to https://[employer]. org and sign in to your [employer] account. Enter W906 in the Web[employer] box to learn more about \"Learning About the HPV Vaccine. \"     If you do not have an account, please click on the \"Sign Up Now\"

## 2019-07-23 NOTE — PROGRESS NOTES
TSH ordered due to symmetrical thyromegaly noted on PE today. There are no symptoms of hypo or hyperthyroidism.

## 2019-07-30 ENCOUNTER — NURSE ONLY (OUTPATIENT)
Dept: OBGYN | Age: 34
End: 2019-07-30
Payer: MEDICARE

## 2019-07-30 VITALS
DIASTOLIC BLOOD PRESSURE: 68 MMHG | HEART RATE: 97 BPM | SYSTOLIC BLOOD PRESSURE: 98 MMHG | BODY MASS INDEX: 43.49 KG/M2 | HEIGHT: 60 IN | WEIGHT: 221.5 LBS

## 2019-07-30 DIAGNOSIS — Z30.42 ENCOUNTER FOR MANAGEMENT AND INJECTION OF DEPO-PROVERA: Primary | ICD-10-CM

## 2019-07-30 LAB
CONTROL: PRESENT
PREGNANCY TEST URINE, POC: NEGATIVE

## 2019-07-30 PROCEDURE — 96372 THER/PROPH/DIAG INJ SC/IM: CPT

## 2019-07-30 PROCEDURE — 99211 OFF/OP EST MAY X REQ PHY/QHP: CPT | Performed by: OBSTETRICS & GYNECOLOGY

## 2019-07-30 PROCEDURE — 81025 URINE PREGNANCY TEST: CPT | Performed by: OBSTETRICS & GYNECOLOGY

## 2019-07-30 PROCEDURE — 96372 THER/PROPH/DIAG INJ SC/IM: CPT | Performed by: OBSTETRICS & GYNECOLOGY

## 2019-07-30 RX ORDER — MEDROXYPROGESTERONE ACETATE 150 MG/ML
150 INJECTION, SUSPENSION INTRAMUSCULAR ONCE
Status: COMPLETED | OUTPATIENT
Start: 2019-07-30 | End: 2019-07-30

## 2019-07-30 RX ADMIN — MEDROXYPROGESTERONE ACETATE 150 MG: 150 INJECTION, SUSPENSION INTRAMUSCULAR at 09:45

## 2019-10-07 ENCOUNTER — NURSE ONLY (OUTPATIENT)
Dept: OBGYN | Age: 34
End: 2019-10-07
Payer: MEDICARE

## 2019-10-07 VITALS
HEIGHT: 60 IN | SYSTOLIC BLOOD PRESSURE: 102 MMHG | TEMPERATURE: 98.4 F | DIASTOLIC BLOOD PRESSURE: 65 MMHG | BODY MASS INDEX: 43.29 KG/M2 | HEART RATE: 95 BPM | WEIGHT: 220.5 LBS

## 2019-10-07 DIAGNOSIS — Z30.09 FAMILY PLANNING: Primary | ICD-10-CM

## 2019-10-07 DIAGNOSIS — N94.6 DYSMENORRHEA: ICD-10-CM

## 2019-10-07 LAB
CONTROL: NORMAL
PREGNANCY TEST URINE, POC: NORMAL

## 2019-10-07 PROCEDURE — 81025 URINE PREGNANCY TEST: CPT | Performed by: OBSTETRICS & GYNECOLOGY

## 2019-10-07 PROCEDURE — 96372 THER/PROPH/DIAG INJ SC/IM: CPT | Performed by: OBSTETRICS & GYNECOLOGY

## 2019-10-07 PROCEDURE — 99211 OFF/OP EST MAY X REQ PHY/QHP: CPT | Performed by: OBSTETRICS & GYNECOLOGY

## 2019-10-07 RX ORDER — MEDROXYPROGESTERONE ACETATE 150 MG/ML
150 INJECTION, SUSPENSION INTRAMUSCULAR ONCE
Status: COMPLETED | OUTPATIENT
Start: 2019-10-07 | End: 2019-10-07

## 2019-10-07 RX ADMIN — MEDROXYPROGESTERONE ACETATE 150 MG: 150 INJECTION, SUSPENSION INTRAMUSCULAR at 10:12

## 2019-10-07 ASSESSMENT — PATIENT HEALTH QUESTIONNAIRE - PHQ9
1. LITTLE INTEREST OR PLEASURE IN DOING THINGS: 0
2. FEELING DOWN, DEPRESSED OR HOPELESS: 0
SUM OF ALL RESPONSES TO PHQ QUESTIONS 1-9: 0
SUM OF ALL RESPONSES TO PHQ QUESTIONS 1-9: 0
SUM OF ALL RESPONSES TO PHQ9 QUESTIONS 1 & 2: 0

## 2020-01-02 ENCOUNTER — NURSE ONLY (OUTPATIENT)
Dept: OBGYN | Age: 35
End: 2020-01-02
Payer: MEDICARE

## 2020-01-02 VITALS
DIASTOLIC BLOOD PRESSURE: 83 MMHG | HEART RATE: 97 BPM | SYSTOLIC BLOOD PRESSURE: 115 MMHG | WEIGHT: 231 LBS | BODY MASS INDEX: 45.11 KG/M2

## 2020-01-02 PROCEDURE — 96372 THER/PROPH/DIAG INJ SC/IM: CPT | Performed by: OBSTETRICS & GYNECOLOGY

## 2020-01-02 PROCEDURE — 99211 OFF/OP EST MAY X REQ PHY/QHP: CPT | Performed by: OBSTETRICS & GYNECOLOGY

## 2020-01-02 RX ORDER — MEDROXYPROGESTERONE ACETATE 150 MG/ML
150 INJECTION, SUSPENSION INTRAMUSCULAR ONCE
Status: COMPLETED | OUTPATIENT
Start: 2020-01-02 | End: 2020-01-02

## 2020-01-02 RX ADMIN — MEDROXYPROGESTERONE ACETATE 150 MG: 150 INJECTION, SUSPENSION INTRAMUSCULAR at 10:23

## 2020-03-05 NOTE — TELEPHONE ENCOUNTER
Last Depo: 01/02/20  Next Depo: 03/26/20    Please refill Depo injection for pt to  for her appointment

## 2020-03-06 RX ORDER — MEDROXYPROGESTERONE ACETATE 150 MG/ML
150 INJECTION, SUSPENSION INTRAMUSCULAR
Qty: 1 ML | Refills: 3 | Status: SHIPPED | OUTPATIENT
Start: 2020-03-06 | End: 2020-09-16

## 2020-08-17 ENCOUNTER — TELEPHONE (OUTPATIENT)
Dept: OBGYN | Age: 35
End: 2020-08-17

## 2020-08-17 NOTE — TELEPHONE ENCOUNTER
Patient called stated she had a Prolapse she stated was exercising and starting a painful period for 4 days. Please contact and advise.

## 2020-09-16 ENCOUNTER — OFFICE VISIT (OUTPATIENT)
Dept: OBGYN | Age: 35
End: 2020-09-16
Payer: MEDICARE

## 2020-09-16 VITALS
DIASTOLIC BLOOD PRESSURE: 75 MMHG | HEIGHT: 60 IN | HEART RATE: 90 BPM | BODY MASS INDEX: 47.23 KG/M2 | WEIGHT: 240.56 LBS | SYSTOLIC BLOOD PRESSURE: 107 MMHG

## 2020-09-16 LAB
CONTROL: PRESENT
PREGNANCY TEST URINE, POC: NEGATIVE

## 2020-09-16 PROCEDURE — 99213 OFFICE O/P EST LOW 20 MIN: CPT | Performed by: OBSTETRICS & GYNECOLOGY

## 2020-09-16 PROCEDURE — G8417 CALC BMI ABV UP PARAM F/U: HCPCS | Performed by: OBSTETRICS & GYNECOLOGY

## 2020-09-16 PROCEDURE — 81025 URINE PREGNANCY TEST: CPT | Performed by: OBSTETRICS & GYNECOLOGY

## 2020-09-16 PROCEDURE — 96372 THER/PROPH/DIAG INJ SC/IM: CPT | Performed by: OBSTETRICS & GYNECOLOGY

## 2020-09-16 PROCEDURE — 99211 OFF/OP EST MAY X REQ PHY/QHP: CPT | Performed by: OBSTETRICS & GYNECOLOGY

## 2020-09-16 PROCEDURE — 1036F TOBACCO NON-USER: CPT | Performed by: OBSTETRICS & GYNECOLOGY

## 2020-09-16 PROCEDURE — G8427 DOCREV CUR MEDS BY ELIG CLIN: HCPCS | Performed by: OBSTETRICS & GYNECOLOGY

## 2020-09-16 RX ORDER — MEDROXYPROGESTERONE ACETATE 150 MG/ML
150 INJECTION, SUSPENSION INTRAMUSCULAR
Qty: 1 ML | Refills: 3 | Status: SHIPPED | OUTPATIENT
Start: 2020-09-16 | End: 2021-08-11 | Stop reason: SDUPTHER

## 2020-09-16 RX ORDER — MEDROXYPROGESTERONE ACETATE 150 MG/ML
150 INJECTION, SUSPENSION INTRAMUSCULAR ONCE
Status: COMPLETED | OUTPATIENT
Start: 2020-09-16 | End: 2020-09-16

## 2020-09-16 RX ADMIN — MEDROXYPROGESTERONE ACETATE 150 MG: 150 INJECTION, SUSPENSION INTRAMUSCULAR at 10:36

## 2020-09-16 NOTE — PROGRESS NOTES
Patient presents to office for Depo Provera injection. Per doctor's orders of Depo Provera 150mg given IM, Left Deltoid, patient tolerated well. Patient advised to return in 11-12 weeks for next injection.

## 2020-09-16 NOTE — PROGRESS NOTES
transfusion reaction     2008 after csection    Chronic migraine without aura with status migrainosus, not intractable     Complication of anesthesia 2008    back pain for 1 year after the spinal     History of blood transfusion 2018    Microcytic anemia 3/3/2014    Sickle cell trait ([facility] [address] 2013       Past Surgical History:   Procedure Laterality Date     SECTION             Family History   Problem Relation Age of Onset    Diabetes Father     Hypertension Father     Diabetes Mother     Hypertension Mother     Depression Mother     Mental Illness Mother     Other Sister         CP  complications     Heart Attack Brother     Cancer Maternal Aunt     Uterine Cancer Maternal Aunt     Breast Cancer Neg Hx     Colon Cancer Neg Hx     Eclampsia Neg Hx     Ovarian Cancer Neg Hx      Labor Neg Hx     Spont Abortions Neg Hx     Stroke Neg Hx        Social History     Socioeconomic History    Marital status: Single     Spouse name: Not on file    Number of children: Not on file    Years of education: Not on file    Highest education level: Not on file   Occupational History    Not on file   Social Needs    Financial resource strain: Not on file    Food insecurity     Worry: Not on file     Inability: Not on file    Transportation needs     Medical: Not on file     Non-medical: Not on file   Tobacco Use    Smoking status: Never Smoker    Smokeless tobacco: Never Used   Substance and Sexual Activity    Alcohol use: No    Drug use: No    Sexual activity: Yes     Partners: Male   Lifestyle    Physical activity     Days per week: Not on file     Minutes per session: Not on file    Stress: Not on file   Relationships    Social connections     Talks on phone: Not on file     Gets together: Not on file     Attends Jain service: Not on file     Active member of club or organization: Not on file     Attends meetings of clubs or organizations: Not on file Relationship status: Not on file    Intimate partner violence     Fear of current or ex partner: Not on file     Emotionally abused: Not on file     Physically abused: Not on file     Forced sexual activity: Not on file   Other Topics Concern    Not on file   Social History Narrative    Not on file         MEDICATIONS:  Current Outpatient Medications   Medication Sig Dispense Refill    medroxyPROGESTERone (DEPO-PROVERA) 150 MG/ML injection Inject 1 mL into the muscle every 3 months 1 mL 3    albuterol sulfate HFA (PROVENTIL HFA) 108 (90 Base) MCG/ACT inhaler Inhale 2 puffs into the lungs every 6 hours as needed for Wheezing 1 Inhaler 3     No current facility-administered medications for this visit. ALLERGIES:  Allergies as of 09/16/2020    (No Known Allergies)         REVIEW OF SYSTEMS:       A minimum of an eleven point review of systems was completed. Review Of Systems (11 point):  Constitutional: No fever, chills or malaise; No weight change or fatigue  Head and Eyes: No vision, Headache, Dizziness or trauma in last 12 months  ENT ROS: No hearing, Tinnitis, sinus or taste problems  Hematological and Lymphatic ROS:No Lymphoma, Von Willebrand's, Hemophillia or Bleeding History  Psych ROS: No Depression, Homicidal thoughts,suicidal thoughts, or anxiety  Breast ROS: No prior breast abnormalities or lumps  Respiratory ROS: No SOB, Pneumoniae,Cough, or Pulmonary Embolism History  Cardiovascular ROS: No Chest Pain with Exertion, Palpitations, Syncope, Edema, Arrhythmia  Gastrointestinal ROS: No Indigestion, Heartburn, Nausea, vomiting, Diarrhea, Constipation,or Bowel Changes; No Bloody Stools or melena  Genito-Urinary ROS:+concern for prolapse. No Dysuria, Hematuria or Nocturia.  No Urinary Incontinence or Vaginal Discharge  Musculoskeletal ROS: No Arthralgia, Arthritis,Gout,Osteoporosis or Rheumatism  Neurological ROS: No CVA, Migraines, Epilepsy, Seizure Hx, or Limb Weakness  Dermatological ROS: No Rash, Itching, Hives, Mole Changes or Cancer          Blood pressure 107/75, pulse 90, height 5' (1.524 m), weight 240 lb 9 oz (109.1 kg), last menstrual period 2020, currently breastfeeding. Abdomen: Soft non-tender; good bowel sounds. No guarding, rebound or rigidity. No CVA tenderness bilaterally. Extremities: No calf tenderness, DTR 2/4, and No edema bilaterally    Pelvic: External genitalia: normal general appearance  Urinary system: urethral meatus normal  Vaginal: normal mucosa without prolapse or lesions, normal rugae and she has a small Grade 1 anterior prolapse. Cervix: normal appearance, no uterine prolapse noted, even with Valsalva  Adnexa: difficult exam secondary to body habitus, no obvious masses palpated  Uterus: difficult exam secondary to body habitus, no obvious masses palpated  Negative bladder sweep, no lymphadenopathy, negative CMT        Lab Results:  Results for orders placed or performed in visit on 20   POCT urine pregnancy   Result Value Ref Range    Preg Test, Ur NEGATIVE     Control PRESENT          Assessment:   Diagnosis Orders   1. Abnormal uterine bleeding (AUB)  POCT urine pregnancy    medroxyPROGESTERone (DEPO-PROVERA) 150 MG/ML injection     Patient Active Problem List    Diagnosis Date Noted    History of  (G8) 2014     Priority: High             Asthma 2014     Priority: Low     Mild intermittent, On long acting and rescue inhaler. Overview:   Overview:   Mild intermittent, controlled  Albuterol prn  Never been hospitalized. Denies attacks.        Postpartum care and examination 2019     18 M Apg 8/9 Wt 6#10  2019    Oligohydramnios, antepartum 2019    Obesity affecting pregnancy in third trimester 2019    38 weeks gestation of pregnancy 2019    Variable fetal heart rate decelerations, antepartum 2018    s/p Celestone , 2018    Abnormality in fetal heart rate/rhythm, antepartum condition or complication     Fetal heart rate decelerations affecting management of mother     Rh+/RI/GBS unk 2018    Abnormal amniotic fluid in third trimester 2018    Elevated 1hr GTT (normal 3hr) 2018     3 hr GTT wnl      Susceptible to varicella (non-immune), currently pregnant 2018    Sickle cell trait  2018     Same Partner. Pt reports that her partner Alberto Lynn is neg for trait. Consider testing partner if not already done.  History of  2018     G7: 2008 for twin gestation       Hx of SAB x4 2018    History of blood transfusion 2018     2008 PP Twin delivery       H/O Indicated PTD 2018     Indicated- twin delivery       Abnormal weight gain 2017    Insomnia 2017    Memory impairment 2017    Increased insulin level 2016    Chronic migraine without aura with status migrainosus, not intractable 2016    BMI 47.6 2014     ASA 81mg daily   testing to start at 32 weeks per MFM recommendation  18- Early diabetic screening. 1 hr gtt ordered             PLAN:  Next pap due 2021  Pt can get depo provera today  Return in three months for next injection  Pelvic exam was unremarkable today. Return 2021, for Annual Exam, and in 3 months for next depo. Repeat Annual every 1 year  Cervical Cytology Evaluation begins at 24years old. If Negative Cytology, Follow-up screening per current guidelines. Counseled on preventative health maintenance follow-up. Orders Placed This Encounter   Procedures    POCT urine pregnancy       Patient was seen with total face to face time of 15 minutes. More than 50% of this visit was counseling and education regarding The encounter diagnosis was Abnormal uterine bleeding (AUB). and Follow-up (AUB, Prolapse)   as well as  counseling on preventative health maintenance follow-up.     Veronica Rodriguez DO

## 2020-12-09 ENCOUNTER — NURSE ONLY (OUTPATIENT)
Dept: OBGYN | Age: 35
End: 2020-12-09
Payer: COMMERCIAL

## 2020-12-09 VITALS
BODY MASS INDEX: 50.04 KG/M2 | HEART RATE: 101 BPM | WEIGHT: 256.2 LBS | SYSTOLIC BLOOD PRESSURE: 126 MMHG | DIASTOLIC BLOOD PRESSURE: 83 MMHG | TEMPERATURE: 97.7 F

## 2020-12-09 PROCEDURE — 99211 OFF/OP EST MAY X REQ PHY/QHP: CPT | Performed by: OBSTETRICS & GYNECOLOGY

## 2020-12-09 PROCEDURE — 96372 THER/PROPH/DIAG INJ SC/IM: CPT | Performed by: OBSTETRICS & GYNECOLOGY

## 2020-12-09 RX ORDER — MEDROXYPROGESTERONE ACETATE 150 MG/ML
150 INJECTION, SUSPENSION INTRAMUSCULAR ONCE
Status: COMPLETED | OUTPATIENT
Start: 2020-12-09 | End: 2020-12-09

## 2020-12-09 RX ADMIN — MEDROXYPROGESTERONE ACETATE 150 MG: 150 INJECTION, SUSPENSION INTRAMUSCULAR at 10:01

## 2021-02-15 ENCOUNTER — HOSPITAL ENCOUNTER (OUTPATIENT)
Age: 36
Setting detail: SPECIMEN
Discharge: HOME | End: 2021-02-15
Payer: COMMERCIAL

## 2021-02-15 ENCOUNTER — OFFICE VISIT (OUTPATIENT)
Dept: OBGYN | Age: 36
End: 2021-02-15
Payer: COMMERCIAL

## 2021-02-15 VITALS
BODY MASS INDEX: 48.75 KG/M2 | DIASTOLIC BLOOD PRESSURE: 73 MMHG | SYSTOLIC BLOOD PRESSURE: 114 MMHG | WEIGHT: 249.6 LBS | HEART RATE: 95 BPM

## 2021-02-15 DIAGNOSIS — F32.A DEPRESSION, UNSPECIFIED DEPRESSION TYPE: ICD-10-CM

## 2021-02-15 DIAGNOSIS — N93.9 ABNORMAL UTERINE BLEEDING: ICD-10-CM

## 2021-02-15 DIAGNOSIS — N93.9 ABNORMAL UTERINE BLEEDING: Primary | ICD-10-CM

## 2021-02-15 PROBLEM — Z3A.38 38 WEEKS GESTATION OF PREGNANCY: Status: RESOLVED | Noted: 2019-01-11 | Resolved: 2021-02-15

## 2021-02-15 LAB
ABSOLUTE EOS #: 0.12 K/UL (ref 0–0.44)
ABSOLUTE IMMATURE GRANULOCYTE: 0.05 K/UL (ref 0–0.3)
ABSOLUTE LYMPH #: 3.08 K/UL (ref 1.1–3.7)
ABSOLUTE MONO #: 0.57 K/UL (ref 0.1–1.2)
BASOPHILS # BLD: 1 % (ref 0–2)
BASOPHILS ABSOLUTE: 0.06 K/UL (ref 0–0.2)
DIFFERENTIAL TYPE: ABNORMAL
DIRECT EXAM: NORMAL
EOSINOPHILS RELATIVE PERCENT: 1 % (ref 1–4)
HCG QUANTITATIVE: <1 IU/L
HCT VFR BLD CALC: 38.7 % (ref 36.3–47.1)
HEMOGLOBIN: 11.8 G/DL (ref 11.9–15.1)
IMMATURE GRANULOCYTES: 0 %
LYMPHOCYTES # BLD: 27 % (ref 24–43)
Lab: NORMAL
MCH RBC QN AUTO: 23.8 PG (ref 25.2–33.5)
MCHC RBC AUTO-ENTMCNC: 30.5 G/DL (ref 28.4–34.8)
MCV RBC AUTO: 78.2 FL (ref 82.6–102.9)
MONOCYTES # BLD: 5 % (ref 3–12)
NRBC AUTOMATED: 0 PER 100 WBC
PDW BLD-RTO: 15.8 % (ref 11.8–14.4)
PLATELET # BLD: 374 K/UL (ref 138–453)
PLATELET ESTIMATE: ABNORMAL
PMV BLD AUTO: 11 FL (ref 8.1–13.5)
RBC # BLD: 4.95 M/UL (ref 3.95–5.11)
RBC # BLD: ABNORMAL 10*6/UL
SEG NEUTROPHILS: 66 % (ref 36–65)
SEGMENTED NEUTROPHILS ABSOLUTE COUNT: 7.42 K/UL (ref 1.5–8.1)
SPECIMEN DESCRIPTION: NORMAL
TSH SERPL DL<=0.05 MIU/L-ACNC: 1.25 MIU/L (ref 0.3–5)
WBC # BLD: 11.3 K/UL (ref 3.5–11.3)
WBC # BLD: ABNORMAL 10*3/UL

## 2021-02-15 PROCEDURE — 99213 OFFICE O/P EST LOW 20 MIN: CPT | Performed by: STUDENT IN AN ORGANIZED HEALTH CARE EDUCATION/TRAINING PROGRAM

## 2021-02-15 PROCEDURE — 1036F TOBACCO NON-USER: CPT | Performed by: STUDENT IN AN ORGANIZED HEALTH CARE EDUCATION/TRAINING PROGRAM

## 2021-02-15 PROCEDURE — 99211 OFF/OP EST MAY X REQ PHY/QHP: CPT | Performed by: STUDENT IN AN ORGANIZED HEALTH CARE EDUCATION/TRAINING PROGRAM

## 2021-02-15 PROCEDURE — G8427 DOCREV CUR MEDS BY ELIG CLIN: HCPCS | Performed by: STUDENT IN AN ORGANIZED HEALTH CARE EDUCATION/TRAINING PROGRAM

## 2021-02-15 PROCEDURE — G8417 CALC BMI ABV UP PARAM F/U: HCPCS | Performed by: STUDENT IN AN ORGANIZED HEALTH CARE EDUCATION/TRAINING PROGRAM

## 2021-02-15 PROCEDURE — G8484 FLU IMMUNIZE NO ADMIN: HCPCS | Performed by: STUDENT IN AN ORGANIZED HEALTH CARE EDUCATION/TRAINING PROGRAM

## 2021-02-15 RX ORDER — IBUPROFEN 600 MG/1
600 TABLET ORAL EVERY 8 HOURS
Qty: 30 TABLET | Refills: 1 | Status: SHIPPED | OUTPATIENT
Start: 2021-02-15 | End: 2022-02-02

## 2021-02-15 RX ORDER — IBUPROFEN 600 MG/1
600 TABLET ORAL EVERY 8 HOURS
Qty: 30 TABLET | Refills: 1 | Status: SHIPPED | OUTPATIENT
Start: 2021-02-15 | End: 2021-02-15

## 2021-02-15 RX ORDER — IBUPROFEN 800 MG/1
800 TABLET ORAL EVERY 8 HOURS
Qty: 30 TABLET | Refills: 1 | Status: SHIPPED | OUTPATIENT
Start: 2021-02-15 | End: 2021-02-15 | Stop reason: CLARIF

## 2021-02-15 ASSESSMENT — PATIENT HEALTH QUESTIONNAIRE - PHQ9
10. IF YOU CHECKED OFF ANY PROBLEMS, HOW DIFFICULT HAVE THESE PROBLEMS MADE IT FOR YOU TO DO YOUR WORK, TAKE CARE OF THINGS AT HOME, OR GET ALONG WITH OTHER PEOPLE: 1
1. LITTLE INTEREST OR PLEASURE IN DOING THINGS: 3
4. FEELING TIRED OR HAVING LITTLE ENERGY: 3
SUM OF ALL RESPONSES TO PHQ QUESTIONS 1-9: 13
5. POOR APPETITE OR OVEREATING: 3
2. FEELING DOWN, DEPRESSED OR HOPELESS: 0
8. MOVING OR SPEAKING SO SLOWLY THAT OTHER PEOPLE COULD HAVE NOTICED. OR THE OPPOSITE, BEING SO FIGETY OR RESTLESS THAT YOU HAVE BEEN MOVING AROUND A LOT MORE THAN USUAL: 0
3. TROUBLE FALLING OR STAYING ASLEEP: 3
9. THOUGHTS THAT YOU WOULD BE BETTER OFF DEAD, OR OF HURTING YOURSELF: 0

## 2021-02-15 NOTE — PROGRESS NOTES
OB/GYN Problem Visit    John Yo  2/15/2021                       Primary Care Physician: PHYSICIAN, NON-STAFF    CC:   Chief Complaint   Patient presents with    Follow-up     AUB with depo shot         HPI: John Yo is a 28 y.o. female [id number]    The patient was seen and examined. She is here for abnormal uterine bleeding since restarting of Depo provera back in September 2020. She reports continuous bleeding changing from spotting and heavy passing dime sized clots intermittently. When the period is heavy, she is soaking through 1 pad a day. She reports some intermittent back and low pelvic pain and shooting vaginal pain associated with it as well. LMP is 9/2020 and has been continuous since then. She describes her periods in the past as irregular where she had a period of no period for a few years. She also noted 30 lb weight gain over the last 3months. She has low appetite and only eats 1 meal a day, more fatigue, dry skin/hair which started 3-6 months ago as well. She denies any hx of thyroid disease in the past and last TSH 12/12/18. She also has hx of depression requiring a few week inpatient stay in her 25s but has not ever been on medications or seen therapy/counseling since then. She declines any therapy/counseling/medications at this time. Pelvic ultrasound 2016 showed a 1cm uterine fibroid. She reports migraines without aura and no personal hx of VTE. Her mother had DVTs in the past and is on a life long blood thinner but unsure of cause. Pt denies any tobacco use or HTN. Her bowel habits are regular. She denies any bloating. She denies dysuria. She reports urinary leaking with coughing/sneezing for years. She denies vaginal discharge. She is not sexually active with one male partner due to her bleeding. She uses abstinence for contraception and is  desiring pregnancy.     REVIEW OF SYSTEMS:   Constitutional: negative fever, negative chills, negative weight changes HEENT: negative visual disturbances, negative headaches, negative dizziness  Breast: negative breast abnormalities, negative breast lumps, negative nipple discharge  Respiratory: negative dyspnea, negative cough, negative SOB  Cardiovascular: negative chest pain,  negative palpitations, negative arrhythmia, negative syncope   Gastrointestinal: negative abdominal pain, negative RUQ pain, negative N/V, negative diarrhea, negative constipation, negative bowel changes  Genitourinary: negative dysuria, negative hematuria, negative urinary incontinence, negative vaginal discharge, positive vaginal bleeding  Dermatological: negative rash, negative pruritis, negative mole or other skin changes  Hematologic: negative bruising, negative personal/family history of DVT/PE  Immunologic/Lymphatic: negative recent illness, negative recent sick contact  Musculoskeletal: negative back pain, negative myalgias, negative arthralgias  Neurological:  negative dizziness, negative migraines, negative seizures, negative weakness  Behavior/Psych: negative depression, negative anxiety, negative SI, negative HI      OBSTETRICAL HISTORY:  OB History    Para Term  AB Living   9 4 3 1 5 5   SAB TAB Ectopic Molar Multiple Live Births   4 1     1 5      # Outcome Date GA Lbr Bairon/2nd Weight Sex Delivery Anes PTL Lv   9 Term 19 39w0d 00:22 / 00:02 6 lb 10.2 oz (3.01 kg) M  OTHER N SUN   8 Term 14 40w2d 04:32 6 lb 8 oz (2.948 kg) F   N SUN   7 TAB               Birth Comments: no D&C   6 2009           5A  10/19/08 36w0d  5 lb 4 oz (2.381 kg) M CS-LTranv   SUN      Birth Comments: twins - malpresentation, spontaneous labor   5B  10/19/08   4 lb 8 oz (2.041 kg) F CS-LTranv   SUN   4 Term 07 39w0d  5 lb 10 oz (2.551 kg) F Vag-Spont   SUN   3 2004           2 2004           1 2004              Obstetric Comments   Same partner with all of her pregnancies        PAST MEDICAL HISTORY:      Diagnosis Date    Asthma     Blood transfusion reaction      after csection    Chronic migraine without aura with status migrainosus, not intractable 4912    Complication of anesthesia     back pain for 1 year after the spinal     History of blood transfusion 2018    Microcytic anemia 3/3/2014    Sickle cell trait ([facility] [address]        PAST SURGICAL HISTORY:                                                                    Procedure Laterality Date     SECTION             MEDICATIONS:  Current Outpatient Medications   Medication Sig Dispense Refill    ibuprofen (ADVIL;MOTRIN) 600 MG tablet Take 1 tablet by mouth every 8 hours Please take up to 4-5 days or until menses ceases 30 tablet 1    medroxyPROGESTERone (DEPO-PROVERA) 150 MG/ML injection Inject 1 mL into the muscle every 3 months 1 mL 3    albuterol sulfate HFA (PROVENTIL HFA) 108 (90 Base) MCG/ACT inhaler Inhale 2 puffs into the lungs every 6 hours as needed for Wheezing 1 Inhaler 3     No current facility-administered medications for this visit. ALLERGIES:  Allergies as of 02/15/2021    (No Known Allergies)         VITALS:  Vitals:    02/15/21 1435   BP: 114/73   Site: Right Lower Arm   Position: Sitting   Cuff Size: Medium Adult   Pulse: 95   Weight: 249 lb 9.6 oz (113.2 kg)                                                                                                                                                                         PHYSICAL EXAM:   Chaperone for Intimate Exam: Chaperone was present for entire exam, Chaperone Name: Abner Guy    General Appearance: Appears healthy. Alert; in no acute distress. Pleasant. Skin: Skin color, texture, turgor normal. No rashes or lesions. Lymphatic: No abnormally enlarged lymph nodes. HEENT: Normocephalic and atraumatic, Thyroid normal to inspection and palpation and non-palpable. Respiratory: Normal expansion. Clear to auscultation. No rales, rhonchi, or wheezing. Cardiovascular: Normal rate, regular rhythm, normal S1 and S2, no murmurs, rubs or gallops. Breast: Inspection negative, No nipple retraction or dimpling, No nipple discharge or bleeding, No axillary or supraclavicular adenopathy, Normal to palpation without dominant masses. Abdomen: Soft, non-tender, no rebound, guarding, rigidity, non-distended  Pelvic Exam:   Sterile Speculum Exam:   External genitalia: Normal hair distribution, normal appearing vulva, no masses, tenderness or lesions, normal clitoris, normal urethral meatus. Vagina: Normal appearing vaginal mucosa without lesions, physiologic appearing vaginal discharge noted in the posterior vault. Cervix: Normal appearing cervix without lesions, moderate amount of bright red blood noted. Sterile Vaginal Exam:  Cervix: No cervical motion tenderness   Uterus: Normal size, shape, consistency and non-tender but limited due to body habitus   Adnexa: Normal size, non-tender, no palpable masses  Rectal Exam: Exam declined by patient. Musculoskeletal: No joint swelling, deformity, or tenderness. , no gross abnormalities. Extremities: Non-tender BLE and non-edematous. Psych: Oriented to time, place and person, mood and affect are within normal limits. OMM EXAM:  The patient did not complain of a Chief complaint requiring OMM.   Chief Complaint:none  Structural Exam: No Interest      ASSESSMENT & PLAN:    Luis Mancilla is a 28 y.o. female [id number] who presents with AUB   - SSE/SVE: moderate amount of bleeding but no active bleeding   - Last pap smear: 7/23/18 negative with HPV negative, not due until 2023   - STD testing:  GC/C and vaginitis collected today   - Family planning: Depo provera shot last 12/9/20, discussed other options such as Mirena IUD and patient requested more information   - Annual exam: due 9/2021   - Will order TSH, bHCG, CBC, Pelvic ultrasound ordered    - Sent Motrin 600 TID [id number] until menses cease and discussed to return if worsening bleeding/pain occurs   - Pt requesting to follow results first before considering any other hormonal options   - Discussed weight loss with diet and exercise regimen     Depression   - Pt diagnosed in the past but never was on medicines and does not follow with anyone for counseling/therapy   - PHQ9 13 noted and discussed with patient   - Pt denies any SI/HI   - Discussed recommendation to start therapy/counseling and can consider starting SSRI treatment and patient declined all resources or medications   - Discussed to return or call if having any SI/HI and pt verbalized and expressed understanding    Patient Active Problem List    Diagnosis Date Noted    History of  (G8) 2014     Priority: High             Asthma 2014     Priority: Low     Mild intermittent, On long acting and rescue inhaler. Overview:   Overview:   Mild intermittent, controlled  Albuterol prn  Never been hospitalized. Denies attacks.  Postpartum care and examination 2019     18 M Apg 8/9 Wt 6#10  2019    Oligohydramnios, antepartum 2019    Obesity affecting pregnancy in third trimester 2019    Variable fetal heart rate decelerations, antepartum 2018    s/p Celestone , 2018    Abnormality in fetal heart rate/rhythm, antepartum condition or complication     Fetal heart rate decelerations affecting management of mother     Rh+/RI/GBS unk 2018    Abnormal amniotic fluid in third trimester 2018    Elevated 1hr GTT (normal 3hr) 2018     3 hr GTT wnl      Susceptible to varicella (non-immune), currently pregnant 2018    Sickle cell trait  2018     Same Partner. Pt reports that her partner Ben Vora is neg for trait. Consider testing partner if not already done.        History of  2018     G7: 2008 for twin gestation       Hx of SAB x4 2018    History of blood transfusion 2018     2008 PP Twin delivery       H/O Indicated PTD 2018     Indicated- twin delivery       Abnormal weight gain 2017    Insomnia 2017    Memory impairment 2017    Increased insulin level 2016    Chronic migraine without aura with status migrainosus, not intractable 2016    BMI 47.6 2014     ASA 81mg daily   testing to start at 32 weeks per [facility] recommendation  18- Early diabetic screening. 1 hr gtt ordered         Return in about 2 months (around 4/15/2021) for Virtual visit after AUB w/u completed . Patient was seen with total face to face time of 20 minutes. More than 50% of this visit was on counseling and education regarding her diagnose(s) as listed below and options. She was also counseled on her preventative health maintenance recommendations and follow-up. Diagnosis Orders   1. Abnormal uterine bleeding  Vaginitis DNA Probe    Chlamydia Trachomatis & Neisseria gonorrhoeae (GC) by amplified detection    TSH With Reflex Ft4    CBC With Auto Differential    HCG, Quantitative, Pregnancy    US NON OB TRANSVAGINAL    US PELVIS COMPLETE    ibuprofen (ADVIL;MOTRIN) 600 MG tablet    DISCONTINUED: ibuprofen (ADVIL;MOTRIN) 800 MG tablet   2.  Depression, unspecified depression type         Raquel Cochran DO  Ob/Gyn Resident  [facility], [facility]  2/15/2021, 4:19 PM

## 2021-02-16 LAB
C TRACH DNA GENITAL QL NAA+PROBE: NEGATIVE
N. GONORRHOEAE DNA: NEGATIVE
SPECIMEN DESCRIPTION: NORMAL

## 2021-03-03 ENCOUNTER — NURSE ONLY (OUTPATIENT)
Dept: OBGYN | Age: 36
End: 2021-03-03
Payer: COMMERCIAL

## 2021-03-03 DIAGNOSIS — N93.9 ABNORMAL UTERINE BLEEDING: Primary | ICD-10-CM

## 2021-03-03 PROCEDURE — 96372 THER/PROPH/DIAG INJ SC/IM: CPT | Performed by: OBSTETRICS & GYNECOLOGY

## 2021-03-03 RX ORDER — MEDROXYPROGESTERONE ACETATE 150 MG/ML
150 INJECTION, SUSPENSION INTRAMUSCULAR ONCE
Status: COMPLETED | OUTPATIENT
Start: 2021-03-03 | End: 2021-03-03

## 2021-03-03 RX ADMIN — MEDROXYPROGESTERONE ACETATE 150 MG: 150 INJECTION, SUSPENSION INTRAMUSCULAR at 10:56

## 2021-03-03 NOTE — PROGRESS NOTES
After obtaining consent, and per orders of Dr. Ryan Kelsey, injection of Medroxyprogesterone 150 mg given in Left deltoid by Guera Monique. Patient instructed to remain in clinic for 20 minutes afterwards, and to report any adverse reaction to me immediately.

## 2021-03-19 ENCOUNTER — ANCILLARY PROCEDURE (OUTPATIENT)
Dept: OBGYN | Age: 36
End: 2021-03-19
Payer: COMMERCIAL

## 2021-03-19 DIAGNOSIS — N93.9 ABNORMAL UTERINE BLEEDING: ICD-10-CM

## 2021-03-19 PROCEDURE — 76830 TRANSVAGINAL US NON-OB: CPT | Performed by: RADIOLOGY

## 2021-03-19 PROCEDURE — 76856 US EXAM PELVIC COMPLETE: CPT | Performed by: RADIOLOGY

## 2021-03-31 ENCOUNTER — TELEPHONE (OUTPATIENT)
Dept: FAMILY MEDICINE CLINIC | Age: 36
End: 2021-03-31

## 2021-03-31 NOTE — TELEPHONE ENCOUNTER
Pt called thinks she has a UTI symptoms are dark urine,pain,pressure freq x yest,please send med to the [facility] and call pt when done,next appt is 4/15/2021

## 2021-03-31 NOTE — TELEPHONE ENCOUNTER
Spoke to [facility] [place] and explained that we could not send medication for a uti she needs to have a culture done to determine if she has a UTI.  She stated that she will go to urgent care for testing or try AZO first.

## 2021-04-06 ENCOUNTER — HOSPITAL ENCOUNTER (EMERGENCY)
Age: 36
Discharge: HOME | End: 2021-04-06
Attending: EMERGENCY MEDICINE
Payer: COMMERCIAL

## 2021-04-06 VITALS
SYSTOLIC BLOOD PRESSURE: 141 MMHG | HEART RATE: 104 BPM | RESPIRATION RATE: 16 BRPM | DIASTOLIC BLOOD PRESSURE: 86 MMHG | OXYGEN SATURATION: 99 % | TEMPERATURE: 97.2 F

## 2021-04-06 DIAGNOSIS — N30.01 ACUTE CYSTITIS WITH HEMATURIA: Primary | ICD-10-CM

## 2021-04-06 LAB
-: ABNORMAL
AMORPHOUS: ABNORMAL
BACTERIA: ABNORMAL
BILIRUBIN URINE: NEGATIVE
CASTS UA: ABNORMAL /LPF (ref 0–8)
COLOR: YELLOW
CRYSTALS, UA: ABNORMAL /HPF
EPITHELIAL CELLS UA: ABNORMAL /HPF (ref 0–5)
GLUCOSE URINE: NEGATIVE
HCG(URINE) PREGNANCY TEST: NEGATIVE
KETONES, URINE: NEGATIVE
LEUKOCYTE ESTERASE, URINE: ABNORMAL
MUCUS: ABNORMAL
NITRITE, URINE: NEGATIVE
OTHER OBSERVATIONS UA: ABNORMAL
PH UA: 5 (ref 5–8)
PROTEIN UA: NEGATIVE
RBC UA: ABNORMAL /HPF (ref 0–4)
RENAL EPITHELIAL, UA: ABNORMAL /HPF
SPECIFIC GRAVITY UA: 1.02 (ref 1–1.03)
TRICHOMONAS: ABNORMAL
TURBIDITY: CLEAR
URINE HGB: ABNORMAL
UROBILINOGEN, URINE: NORMAL
WBC UA: ABNORMAL /HPF (ref 0–5)
YEAST: ABNORMAL

## 2021-04-06 PROCEDURE — 6370000000 HC RX 637 (ALT 250 FOR IP): Performed by: EMERGENCY MEDICINE

## 2021-04-06 PROCEDURE — 99285 EMERGENCY DEPT VISIT HI MDM: CPT

## 2021-04-06 PROCEDURE — 81025 URINE PREGNANCY TEST: CPT

## 2021-04-06 PROCEDURE — 87088 URINE BACTERIA CULTURE: CPT

## 2021-04-06 PROCEDURE — 81001 URINALYSIS AUTO W/SCOPE: CPT

## 2021-04-06 PROCEDURE — 87086 URINE CULTURE/COLONY COUNT: CPT

## 2021-04-06 PROCEDURE — 87186 SC STD MICRODIL/AGAR DIL: CPT

## 2021-04-06 RX ORDER — PHENAZOPYRIDINE HYDROCHLORIDE 200 MG/1
200 TABLET, FILM COATED ORAL 3 TIMES DAILY PRN
Qty: 6 TABLET | Refills: 0 | Status: SHIPPED | OUTPATIENT
Start: 2021-04-06 | End: 2021-04-06 | Stop reason: SDUPTHER

## 2021-04-06 RX ORDER — PHENAZOPYRIDINE HYDROCHLORIDE 100 MG/1
200 TABLET, FILM COATED ORAL ONCE
Status: COMPLETED | OUTPATIENT
Start: 2021-04-06 | End: 2021-04-06

## 2021-04-06 RX ORDER — CEPHALEXIN 500 MG/1
500 CAPSULE ORAL 2 TIMES DAILY
Qty: 20 CAPSULE | Refills: 0 | Status: SHIPPED | OUTPATIENT
Start: 2021-04-06 | End: 2021-04-06 | Stop reason: SDUPTHER

## 2021-04-06 RX ORDER — CEPHALEXIN 500 MG/1
500 CAPSULE ORAL 2 TIMES DAILY
Qty: 20 CAPSULE | Refills: 0 | Status: SHIPPED | OUTPATIENT
Start: 2021-04-06 | End: 2021-04-16

## 2021-04-06 RX ORDER — CEPHALEXIN 500 MG/1
500 CAPSULE ORAL ONCE
Status: COMPLETED | OUTPATIENT
Start: 2021-04-06 | End: 2021-04-06

## 2021-04-06 RX ORDER — PHENAZOPYRIDINE HYDROCHLORIDE 200 MG/1
200 TABLET, FILM COATED ORAL 3 TIMES DAILY PRN
Qty: 6 TABLET | Refills: 0 | Status: SHIPPED | OUTPATIENT
Start: 2021-04-06 | End: 2021-04-09

## 2021-04-06 RX ADMIN — CEPHALEXIN 500 MG: 500 CAPSULE ORAL at 12:11

## 2021-04-06 RX ADMIN — PHENAZOPYRIDINE HYDROCHLORIDE 200 MG: 100 TABLET ORAL at 12:11

## 2021-04-06 ASSESSMENT — PAIN DESCRIPTION - ORIENTATION: ORIENTATION: LOWER

## 2021-04-06 ASSESSMENT — ENCOUNTER SYMPTOMS
NAUSEA: 0
BACK PAIN: 1
DIARRHEA: 0
VOMITING: 0
ABDOMINAL PAIN: 1
CONSTIPATION: 0

## 2021-04-06 ASSESSMENT — PAIN DESCRIPTION - PAIN TYPE: TYPE: ACUTE PAIN

## 2021-04-06 ASSESSMENT — PAIN DESCRIPTION - DESCRIPTORS: DESCRIPTORS: ACHING;RADIATING

## 2021-04-06 ASSESSMENT — PAIN SCALES - GENERAL: PAINLEVEL_OUTOF10: 8

## 2021-04-06 ASSESSMENT — PAIN DESCRIPTION - ONSET: ONSET: ON-GOING

## 2021-04-06 NOTE — ED PROVIDER NOTES
[facility] ED     Emergency Department     Faculty Attestation    I performed a history and physical examination of the patient and discussed management with the resident. I reviewed the residents note and agree with the documented findings and plan of care. Any areas of disagreement are noted on the chart. I was personally present for the key portions of any procedures. I have documented in the chart those procedures where I was not present during the key portions. I have reviewed the emergency nurses triage note. I agree with the chief complaint, past medical history, past surgical history, allergies, medications, social and family history as documented unless otherwise noted below. For Physician Assistant/ Nurse Practitioner cases/documentation I have personally evaluated this patient and have completed at least one if not all key elements of the E/M (history, physical exam, and MDM). Additional findings are as noted. This patient was evaluated in the Emergency Department for symptoms described in the history of present illness. He/she was evaluated in the context of the global COVID-19 pandemic, which necessitated consideration that the patient might be at risk for infection with the SARS-CoV-2 virus that causes COVID-19. Institutional protocols and algorithms that pertain to the evaluation of patients at risk for COVID-19 are in a state of rapid change based on information released by regulatory bodies including the CDC and federal and state organizations. These policies and algorithms were followed during the patient's care in the ED. Patient here with lower pelvic pain for the last week. No vaginal pain no discharge but multiple urinary complaints. No concerns for STDs. No fevers no vomiting. Well-appearing on exam no CVA tenderness minimal suprapubic tenderness.   Will check urine, pregnancy test, plan for pelvic exam should urine be negative, plan discharge      Critical Care     none    Reji Bender MD, Shonda Ann  Attending Emergency  Physician             Reji Bender MD  04/06/21 [id number]

## 2021-04-06 NOTE — ED PROVIDER NOTES
[facility]  Emergency Department Encounter  Emergency Medicine Resident     Pt Name: Severa Garre  MRN: [id number]  [place] 1985  Date of evaluation: 21  PCP:  PHYSICIAN, NON-STAFF    CHIEF COMPLAINT       Chief Complaint   Patient presents with    Hip Pain       HISTORY OF PRESENT ILLNESS  (Location/Symptom, Timing/Onset, Context/Setting, Quality, Duration, Modifying Factors, Severity.)    Severa Garre is a 28 y.o. female who presents with 1 week of progressively worsening suprapubic abdominal pain associated with dark urine, severe dysuria. Patient states that the pain is now starting to wrap around into her back. Has had UTIs in the past but has been such a long time ago she cannot recall this is at all similar. She is sexually active but is monogamous and is not concerned about STDs. She denies any concern about pregnancy she is currently on the Depo-Provera shot with her last dose being approximately 1 month ago. Denies any abnormal vaginal bleeding, vaginal discharge, fever, chills, sweats, nausea or vomiting. PAST MEDICAL / SURGICAL / SOCIAL / FAMILY HISTORY    has a past medical history of Asthma, Blood transfusion reaction, Chronic migraine without aura with status migrainosus, not intractable, Complication of anesthesia, History of blood transfusion, Microcytic anemia, and Sickle cell trait ([facility] [address] has a past surgical history that includes  section.     Social History     Socioeconomic History    Marital status: Single     Spouse name: Not on file    Number of children: Not on file    Years of education: Not on file    Highest education level: Not on file   Occupational History    Not on file   Social Needs    Financial resource strain: Not on file    Food insecurity     Worry: Not on file     Inability: Not on file    Transportation needs     Medical: Not on file     Non-medical: Not on file   Tobacco Use    Smoking status: Never Smoker    Smokeless tobacco: Never Used   Substance and Sexual Activity    Alcohol use: No    Drug use: No    Sexual activity: Yes     Partners: Male   Lifestyle    Physical activity     Days per week: Not on file     Minutes per session: Not on file    Stress: Not on file   Relationships    Social connections     Talks on phone: Not on file     Gets together: Not on file     Attends [employer] service: Not on file     Active member of club or organization: Not on file     Attends meetings of clubs or organizations: Not on file     Relationship status: Not on file    Intimate partner violence     Fear of current or ex partner: Not on file     Emotionally abused: Not on file     Physically abused: Not on file     Forced sexual activity: Not on file   Other Topics Concern    Not on file   Social History Narrative    Not on file       Family History   Problem Relation Age of Onset    Diabetes Father     Hypertension Father     Diabetes Mother     Hypertension Mother     Depression Mother     Mental Illness Mother     Other Sister         CP  complications     Heart Attack Brother     Cancer Maternal Aunt     Uterine Cancer Maternal Aunt     Breast Cancer Neg Hx     Colon Cancer Neg Hx     Eclampsia Neg Hx     Ovarian Cancer Neg Hx      Labor Neg Hx     Spont Abortions Neg Hx     Stroke Neg Hx        Allergies:    Patient has no known allergies. Home Medications:  Prior to Admission medications    Medication Sig Start Date End Date Taking?  Authorizing Provider   cephALEXin (KEFLEX) 500 MG capsule Take 1 capsule by mouth 2 times daily for 10 days 21 Yes Karma Gonzalez DO   phenazopyridine (PYRIDIUM) 200 MG tablet Take 1 tablet by mouth 3 times daily as needed for Pain (bladder spasm/pain) 21 Yes Karma Gonzalez DO   ibuprofen (ADVIL;MOTRIN) 600 MG tablet Take 1 tablet by mouth every 8 hours Please take up to 4-5 days or until menses ceases 2/15/21   Alexx Shine DO tenderness, guarding or rebound. Negative signs include Boucher's sign. Musculoskeletal: Normal range of motion. Skin:     General: Skin is warm and dry. Neurological:      General: No focal deficit present. Mental Status: She is alert. Psychiatric:         Behavior: Behavior normal.         DIFFERENTIAL  DIAGNOSIS   PLAN (LABS / Wendall Josué / EKG):  Orders Placed This Encounter   Procedures    Culture, Urine    Urinalysis with microscopic    Pregnancy, Urine       MEDICATIONS ORDERED:  Orders Placed This Encounter   Medications    cephALEXin (KEFLEX) capsule 500 mg     Order Specific Question:   Antimicrobial Indications     Answer:   Urinary Tract Infection    phenazopyridine (PYRIDIUM) tablet 200 mg    DISCONTD: cephALEXin (KEFLEX) 500 MG capsule     Sig: Take 1 capsule by mouth 2 times daily for 10 days     Dispense:  20 capsule     Refill:  0    DISCONTD: phenazopyridine (PYRIDIUM) 200 MG tablet     Sig: Take 1 tablet by mouth 3 times daily as needed for Pain (bladder spasm/pain)     Dispense:  6 tablet     Refill:  0    cephALEXin (KEFLEX) 500 MG capsule     Sig: Take 1 capsule by mouth 2 times daily for 10 days     Dispense:  20 capsule     Refill:  0    phenazopyridine (PYRIDIUM) 200 MG tablet     Sig: Take 1 tablet by mouth 3 times daily as needed for Pain (bladder spasm/pain)     Dispense:  6 tablet     Refill:  0     DIAGNOSTIC RESULTS / EMERGENCYDEPARTMENT COURSE / MDM   LABS:  Labs Reviewed   URINALYSIS WITH MICROSCOPIC - Abnormal; Notable for the following components:       Result Value    Urine Hgb TRACE (*)     Leukocyte Esterase, Urine MODERATE (*)     Bacteria, UA MODERATE (*)     All other components within normal limits   CULTURE, URINE   PREGNANCY, URINE       RADIOLOGY:  No results found.     EMERGENCY DEPARTMENT COURSE:  ED Course as of Apr 06 1217   Tue Apr 06, 2021   1107 During initial eval patient wants to delay pelvic exam until after urine comes back to see if it is a UTI other providers: yes    Patient Progress  Patient progress: stable    CONSULTS:  None    CRITICAL CARE:  Please see attending note    FINAL IMPRESSION     1. Acute cystitis with hematuria       DISPOSITION / PLAN   DISPOSITION Decision To Discharge 04/06/2021 12:08:37 PM      Evaluation and treatment course in the ED, and plan of care upon discharge was discussed in length with the patient. Patient had no further questions prior to being discharged and was instructed to return to the ED for new or worsening symptoms. Any changes to existing medications or new prescriptions were reviewed with patient and they expressed understanding of how to correctly take their medications and the possible side effects. PATIENT REFERRED TO:  [facility] ED  [address] [facility] [address] [phone]    As needed, If symptoms worsen      DISCHARGE MEDICATIONS:  Discharge Medication List as of 4/6/2021 12:09 PM      START taking these medications    Details   cephALEXin (KEFLEX) 500 MG capsule Take 1 capsule by mouth 2 times daily for 10 days, Disp-20 capsule, R-0Print      phenazopyridine (PYRIDIUM) 200 MG tablet Take 1 tablet by mouth 3 times daily as needed for Pain (bladder spasm/pain), Disp-6 tablet, R-0Print             Karma Henriquez, [place]  Emergency Medicine Resident Physician, PGY-3    (Please note that portions of this note were completed with a voice recognition program.  Efforts were made to edit the dictations but occasionally words are mis-transcribed.)        Lore Estevez 1721, DO  Resident  04/06/21 2021

## 2021-04-06 NOTE — ED NOTES
Pt. Presents to the ED with c/o pelvic pain and pain with urination. Pt. States \"I feel like I have a UTI\". Pt. States she did have a prior UTI and the pain she is having now is similar to then. Pt. Denies any discharge or vaginal bleeding. Pt. Denies any odor at this time. Pt resting on stretcher, no respiratory distress noted, pt updated on plan of care, will continue to monitor, call light in reach.        Antonietta Colby RN  04/06/21 2879

## 2021-04-06 NOTE — ED NOTES
Pt resting on stretcher, no respiratory distress noted, pt updated on plan of care, will continue to monitor, call light in reach.        Pastor Santosh RN  04/06/21 1268

## 2021-04-07 LAB
CULTURE: ABNORMAL
Lab: ABNORMAL
SPECIMEN DESCRIPTION: ABNORMAL

## 2021-04-15 ENCOUNTER — VIRTUAL VISIT (OUTPATIENT)
Dept: OBGYN | Age: 36
End: 2021-04-15
Payer: COMMERCIAL

## 2021-04-15 DIAGNOSIS — N93.9 ABNORMAL UTERINE BLEEDING: Primary | ICD-10-CM

## 2021-04-15 PROCEDURE — 99442 PR PHYS/QHP TELEPHONE EVALUATION 11-20 MIN: CPT | Performed by: STUDENT IN AN ORGANIZED HEALTH CARE EDUCATION/TRAINING PROGRAM

## 2021-04-23 NOTE — PROGRESS NOTES
I have discussed the care of the patient including pertinent history and examination findings with the resident. I agree with the assessment, and and orders as documented  by the resident. GE Modifier: This service has been performed by a resident physician under the direction of a teaching physician.
credentialed to provide care.     Note: not billable if this call serves to triage the patient into an appointment for the relevant concern      DO BETSEY Sheth Resident, PGY3  [facility]  4/15/2021, 5:04 PM

## 2021-06-02 ENCOUNTER — NURSE ONLY (OUTPATIENT)
Dept: OBGYN | Age: 36
End: 2021-06-02
Payer: COMMERCIAL

## 2021-06-02 VITALS — HEIGHT: 60 IN | BODY MASS INDEX: 49.3 KG/M2 | WEIGHT: 251.13 LBS

## 2021-06-02 DIAGNOSIS — Z30.42 ENCOUNTER FOR SURVEILLANCE OF INJECTABLE CONTRACEPTIVE: Primary | ICD-10-CM

## 2021-06-02 PROCEDURE — 96372 THER/PROPH/DIAG INJ SC/IM: CPT

## 2021-06-02 PROCEDURE — 99211 OFF/OP EST MAY X REQ PHY/QHP: CPT | Performed by: OBSTETRICS & GYNECOLOGY

## 2021-06-02 RX ORDER — MEDROXYPROGESTERONE ACETATE 150 MG/ML
150 INJECTION, SUSPENSION INTRAMUSCULAR ONCE
Status: COMPLETED | OUTPATIENT
Start: 2021-06-02 | End: 2021-06-02

## 2021-06-02 RX ADMIN — MEDROXYPROGESTERONE ACETATE 150 MG: 150 INJECTION, SUSPENSION INTRAMUSCULAR at 09:16

## 2021-08-11 DIAGNOSIS — N93.9 ABNORMAL UTERINE BLEEDING (AUB): ICD-10-CM

## 2021-08-11 RX ORDER — MEDROXYPROGESTERONE ACETATE 150 MG/ML
150 INJECTION, SUSPENSION INTRAMUSCULAR
Qty: 1 ML | Refills: 3 | Status: SHIPPED | OUTPATIENT
Start: 2021-08-11

## 2021-08-25 ENCOUNTER — NURSE ONLY (OUTPATIENT)
Dept: OBGYN | Age: 36
End: 2021-08-25
Payer: COMMERCIAL

## 2021-08-25 DIAGNOSIS — N93.9 ABNORMAL UTERINE BLEEDING (AUB): Primary | ICD-10-CM

## 2021-08-25 PROCEDURE — 96372 THER/PROPH/DIAG INJ SC/IM: CPT | Performed by: OBSTETRICS & GYNECOLOGY

## 2021-08-25 RX ORDER — MEDROXYPROGESTERONE ACETATE 150 MG/ML
150 INJECTION, SUSPENSION INTRAMUSCULAR ONCE
Status: COMPLETED | OUTPATIENT
Start: 2021-08-25 | End: 2021-08-25

## 2021-08-25 RX ADMIN — MEDROXYPROGESTERONE ACETATE 150 MG: 150 INJECTION, SUSPENSION INTRAMUSCULAR at 10:58

## 2021-08-25 NOTE — PROGRESS NOTES
After obtaining consent, and per orders of Dr. Mendenhall, injection of Medroxyprogesterone 150 mg given in Left deltoid by Hope Champion. Patient instructed to remain in clinic for 20 minutes afterwards, and to report any adverse reaction to me immediately.

## 2021-11-17 ENCOUNTER — NURSE ONLY (OUTPATIENT)
Dept: OBGYN | Age: 36
End: 2021-11-17
Payer: COMMERCIAL

## 2021-11-17 DIAGNOSIS — Z30.42 ENCOUNTER FOR DEPO-PROVERA CONTRACEPTION: ICD-10-CM

## 2021-11-17 PROCEDURE — 96372 THER/PROPH/DIAG INJ SC/IM: CPT | Performed by: OBSTETRICS & GYNECOLOGY

## 2021-11-17 PROCEDURE — 90471 IMMUNIZATION ADMIN: CPT | Performed by: OBSTETRICS & GYNECOLOGY

## 2021-11-17 RX ORDER — MEDROXYPROGESTERONE ACETATE 150 MG/ML
150 INJECTION, SUSPENSION INTRAMUSCULAR ONCE
Status: COMPLETED | OUTPATIENT
Start: 2021-11-17 | End: 2021-11-17

## 2021-11-17 RX ADMIN — MEDROXYPROGESTERONE ACETATE 150 MG: 150 INJECTION, SUSPENSION INTRAMUSCULAR at 11:17

## 2021-11-17 NOTE — PROGRESS NOTES
After obtaining consent, and per orders of Dr. Vandana Rainey, injection of Depo given in Right deltoid by Marco Patel MA. Patient instructed to remain in clinic for 20 minutes afterwards, and to report any adverse reaction to me immediately.

## 2022-02-02 ENCOUNTER — HOSPITAL ENCOUNTER (EMERGENCY)
Age: 37
Discharge: HOME | End: 2022-02-02
Attending: EMERGENCY MEDICINE
Payer: COMMERCIAL

## 2022-02-02 ENCOUNTER — APPOINTMENT (OUTPATIENT)
Dept: CT IMAGING | Age: 37
End: 2022-02-02
Payer: COMMERCIAL

## 2022-02-02 VITALS
SYSTOLIC BLOOD PRESSURE: 151 MMHG | DIASTOLIC BLOOD PRESSURE: 98 MMHG | BODY MASS INDEX: 47.12 KG/M2 | HEIGHT: 60 IN | HEART RATE: 102 BPM | RESPIRATION RATE: 20 BRPM | OXYGEN SATURATION: 100 % | TEMPERATURE: 99.1 F | WEIGHT: 240 LBS

## 2022-02-02 DIAGNOSIS — R53.1 ACUTE RIGHT-SIDED WEAKNESS: Primary | ICD-10-CM

## 2022-02-02 LAB
% CKMB: 1.8 % (ref 0–3)
ABSOLUTE EOS #: 0.17 K/UL (ref 0–0.44)
ABSOLUTE IMMATURE GRANULOCYTE: 0.06 K/UL (ref 0–0.3)
ABSOLUTE LYMPH #: 3.09 K/UL (ref 1.1–3.7)
ABSOLUTE MONO #: 0.66 K/UL (ref 0.1–1.2)
ALLEN TEST: ABNORMAL
ANION GAP SERPL CALCULATED.3IONS-SCNC: 15 MMOL/L (ref 9–17)
ANION GAP: 10 MMOL/L (ref 7–16)
BASOPHILS # BLD: 1 % (ref 0–2)
BASOPHILS ABSOLUTE: 0.09 K/UL (ref 0–0.2)
BUN BLDV-MCNC: 8 MG/DL (ref 6–20)
BUN/CREAT BLD: ABNORMAL (ref 9–20)
CALCIUM SERPL-MCNC: 9.2 MG/DL (ref 8.6–10.4)
CHLORIDE BLD-SCNC: 103 MMOL/L (ref 98–107)
CK MB: <1 NG/ML
CKMB INTERPRETATION: ABNORMAL
CO2: 17 MMOL/L (ref 20–31)
CREAT SERPL-MCNC: 0.9 MG/DL (ref 0.5–0.9)
DIFFERENTIAL TYPE: ABNORMAL
EOSINOPHILS RELATIVE PERCENT: 1 % (ref 1–4)
FIO2: ABNORMAL
GFR AFRICAN AMERICAN: >60 ML/MIN
GFR NON-AFRICAN AMERICAN: >60 ML/MIN
GFR NON-AFRICAN AMERICAN: >60 ML/MIN
GFR SERPL CREATININE-BSD FRML MDRD: >60 ML/MIN
GFR SERPL CREATININE-BSD FRML MDRD: ABNORMAL ML/MIN/{1.73_M2}
GFR SERPL CREATININE-BSD FRML MDRD: ABNORMAL ML/MIN/{1.73_M2}
GFR SERPL CREATININE-BSD FRML MDRD: NORMAL ML/MIN/{1.73_M2}
GLUCOSE BLD-MCNC: 92 MG/DL (ref 70–99)
GLUCOSE BLD-MCNC: 92 MG/DL (ref 74–100)
HCG QUALITATIVE: NEGATIVE
HCO3 VENOUS: 23.4 MMOL/L (ref 22–29)
HCT VFR BLD CALC: 42.8 % (ref 36.3–47.1)
HEMOGLOBIN: 13.5 G/DL (ref 11.9–15.1)
IMMATURE GRANULOCYTES: 1 %
INR BLD: 1
LYMPHOCYTES # BLD: 25 % (ref 24–43)
MCH RBC QN AUTO: 24.3 PG (ref 25.2–33.5)
MCHC RBC AUTO-ENTMCNC: 31.5 G/DL (ref 28.4–34.8)
MCV RBC AUTO: 77.1 FL (ref 82.6–102.9)
MODE: ABNORMAL
MONOCYTES # BLD: 5 % (ref 3–12)
MYOGLOBIN: <21 NG/ML (ref 25–58)
NEGATIVE BASE EXCESS, VEN: 2 (ref 0–2)
NRBC AUTOMATED: 0 PER 100 WBC
O2 DEVICE/FLOW/%: ABNORMAL
O2 SAT, VEN: 59 % (ref 60–85)
PARTIAL THROMBOPLASTIN TIME: 23.5 SEC (ref 20.5–30.5)
PATIENT TEMP: ABNORMAL
PCO2, VEN: 40.9 MM HG (ref 41–51)
PDW BLD-RTO: 15.3 % (ref 11.8–14.4)
PH VENOUS: 7.37 (ref 7.32–7.43)
PLATELET # BLD: 362 K/UL (ref 138–453)
PLATELET ESTIMATE: ABNORMAL
PMV BLD AUTO: 10.2 FL (ref 8.1–13.5)
PO2, VEN: 31.9 MM HG (ref 30–50)
POC BUN: 7 MG/DL (ref 8–26)
POC CHLORIDE: 108 MMOL/L (ref 98–107)
POC CREATININE: 0.82 MG/DL (ref 0.51–1.19)
POC HEMATOCRIT: 47 % (ref 36–46)
POC HEMOGLOBIN: 15.8 G/DL (ref 12–16)
POC IONIZED CALCIUM: 1.19 MMOL/L (ref 1.15–1.33)
POC LACTIC ACID: 1.63 MMOL/L (ref 0.56–1.39)
POC PCO2 TEMP: ABNORMAL MM HG
POC PH TEMP: ABNORMAL
POC PO2 TEMP: ABNORMAL MM HG
POC POTASSIUM: 3.8 MMOL/L (ref 3.5–4.5)
POC SODIUM: 141 MMOL/L (ref 138–146)
POC TCO2: 24 MMOL/L (ref 22–30)
POSITIVE BASE EXCESS, VEN: ABNORMAL (ref 0–3)
POTASSIUM SERPL-SCNC: 3.8 MMOL/L (ref 3.7–5.3)
PROTHROMBIN TIME: 10.4 SEC (ref 9.1–12.3)
RBC # BLD: 5.55 M/UL (ref 3.95–5.11)
RBC # BLD: ABNORMAL 10*6/UL
SAMPLE SITE: ABNORMAL
SARS-COV-2, RAPID: NOT DETECTED
SEG NEUTROPHILS: 67 % (ref 36–65)
SEGMENTED NEUTROPHILS ABSOLUTE COUNT: 8.54 K/UL (ref 1.5–8.1)
SODIUM BLD-SCNC: 135 MMOL/L (ref 135–144)
SPECIMEN DESCRIPTION: NORMAL
TOTAL CK: 57 U/L (ref 26–192)
TOTAL CO2, VENOUS: ABNORMAL MMOL/L (ref 23–30)
TROPONIN INTERP: ABNORMAL
TROPONIN T: ABNORMAL NG/ML
TROPONIN, HIGH SENSITIVITY: <6 NG/L (ref 0–14)
WBC # BLD: 12.6 K/UL (ref 3.5–11.3)
WBC # BLD: ABNORMAL 10*3/UL

## 2022-02-02 PROCEDURE — 99284 EMERGENCY DEPT VISIT MOD MDM: CPT | Performed by: PSYCHIATRY & NEUROLOGY

## 2022-02-02 PROCEDURE — 84484 ASSAY OF TROPONIN QUANT: CPT

## 2022-02-02 PROCEDURE — 85610 PROTHROMBIN TIME: CPT

## 2022-02-02 PROCEDURE — 82550 ASSAY OF CK (CPK): CPT

## 2022-02-02 PROCEDURE — 85730 THROMBOPLASTIN TIME PARTIAL: CPT

## 2022-02-02 PROCEDURE — 80048 BASIC METABOLIC PNL TOTAL CA: CPT

## 2022-02-02 PROCEDURE — 6360000002 HC RX W HCPCS

## 2022-02-02 PROCEDURE — 70450 CT HEAD/BRAIN W/O DYE: CPT

## 2022-02-02 PROCEDURE — 6360000004 HC RX CONTRAST MEDICATION: Performed by: EMERGENCY MEDICINE

## 2022-02-02 PROCEDURE — 93005 ELECTROCARDIOGRAM TRACING: CPT | Performed by: STUDENT IN AN ORGANIZED HEALTH CARE EDUCATION/TRAINING PROGRAM

## 2022-02-02 PROCEDURE — 84703 CHORIONIC GONADOTROPIN ASSAY: CPT

## 2022-02-02 PROCEDURE — 96375 TX/PRO/DX INJ NEW DRUG ADDON: CPT

## 2022-02-02 PROCEDURE — 82803 BLOOD GASES ANY COMBINATION: CPT

## 2022-02-02 PROCEDURE — 84520 ASSAY OF UREA NITROGEN: CPT

## 2022-02-02 PROCEDURE — 82330 ASSAY OF CALCIUM: CPT

## 2022-02-02 PROCEDURE — 83874 ASSAY OF MYOGLOBIN: CPT

## 2022-02-02 PROCEDURE — 85014 HEMATOCRIT: CPT

## 2022-02-02 PROCEDURE — 85025 COMPLETE CBC W/AUTO DIFF WBC: CPT

## 2022-02-02 PROCEDURE — 70498 CT ANGIOGRAPHY NECK: CPT

## 2022-02-02 PROCEDURE — 82565 ASSAY OF CREATININE: CPT

## 2022-02-02 PROCEDURE — 99285 EMERGENCY DEPT VISIT HI MDM: CPT

## 2022-02-02 PROCEDURE — 82947 ASSAY GLUCOSE BLOOD QUANT: CPT

## 2022-02-02 PROCEDURE — 83605 ASSAY OF LACTIC ACID: CPT

## 2022-02-02 PROCEDURE — 96365 THER/PROPH/DIAG IV INF INIT: CPT

## 2022-02-02 PROCEDURE — 6360000002 HC RX W HCPCS: Performed by: STUDENT IN AN ORGANIZED HEALTH CARE EDUCATION/TRAINING PROGRAM

## 2022-02-02 PROCEDURE — 82553 CREATINE MB FRACTION: CPT

## 2022-02-02 PROCEDURE — 80051 ELECTROLYTE PANEL: CPT

## 2022-02-02 PROCEDURE — 87635 SARS-COV-2 COVID-19 AMP PRB: CPT

## 2022-02-02 RX ORDER — ACETAMINOPHEN 500 MG
1000 TABLET ORAL EVERY 6 HOURS PRN
Qty: 20 TABLET | Refills: 1 | Status: SHIPPED | OUTPATIENT
Start: 2022-02-02

## 2022-02-02 RX ORDER — NAPROXEN 500 MG/1
500 TABLET ORAL 2 TIMES DAILY WITH MEALS
Qty: 20 TABLET | Refills: 1 | Status: SHIPPED | OUTPATIENT
Start: 2022-02-02 | End: 2022-09-12

## 2022-02-02 RX ORDER — MAGNESIUM SULFATE 1 G/100ML
1000 INJECTION INTRAVENOUS ONCE
Status: COMPLETED | OUTPATIENT
Start: 2022-02-02 | End: 2022-02-02

## 2022-02-02 RX ORDER — MAGNESIUM SULFATE 1 G/100ML
INJECTION INTRAVENOUS
Status: COMPLETED
Start: 2022-02-02 | End: 2022-02-02

## 2022-02-02 RX ORDER — DEXAMETHASONE SODIUM PHOSPHATE 10 MG/ML
10 INJECTION INTRAMUSCULAR; INTRAVENOUS ONCE
Status: COMPLETED | OUTPATIENT
Start: 2022-02-02 | End: 2022-02-02

## 2022-02-02 RX ORDER — DIPHENHYDRAMINE HYDROCHLORIDE 50 MG/ML
25 INJECTION INTRAMUSCULAR; INTRAVENOUS ONCE
Status: COMPLETED | OUTPATIENT
Start: 2022-02-02 | End: 2022-02-02

## 2022-02-02 RX ORDER — LANOLIN ALCOHOL/MO/W.PET/CERES
400 CREAM (GRAM) TOPICAL DAILY
Qty: 10 TABLET | Refills: 0 | Status: SHIPPED | OUTPATIENT
Start: 2022-02-02 | End: 2022-07-07

## 2022-02-02 RX ORDER — PROCHLORPERAZINE EDISYLATE 5 MG/ML
10 INJECTION INTRAMUSCULAR; INTRAVENOUS ONCE
Status: COMPLETED | OUTPATIENT
Start: 2022-02-02 | End: 2022-02-02

## 2022-02-02 RX ADMIN — DIPHENHYDRAMINE HYDROCHLORIDE 25 MG: 50 INJECTION, SOLUTION INTRAMUSCULAR; INTRAVENOUS at 18:32

## 2022-02-02 RX ADMIN — IOPAMIDOL 90 ML: 755 INJECTION, SOLUTION INTRAVENOUS at 18:29

## 2022-02-02 RX ADMIN — MAGNESIUM SULFATE 1000 MG: 1 INJECTION INTRAVENOUS at 18:57

## 2022-02-02 RX ADMIN — PROCHLORPERAZINE EDISYLATE 10 MG: 5 INJECTION INTRAMUSCULAR; INTRAVENOUS at 18:32

## 2022-02-02 RX ADMIN — MAGNESIUM SULFATE HEPTAHYDRATE 1000 MG: 1 INJECTION, SOLUTION INTRAVENOUS at 18:57

## 2022-02-02 RX ADMIN — DEXAMETHASONE SODIUM PHOSPHATE 10 MG: 10 INJECTION INTRAMUSCULAR; INTRAVENOUS at 18:32

## 2022-02-02 ASSESSMENT — PAIN SCALES - GENERAL: PAINLEVEL_OUTOF10: 6

## 2022-02-02 ASSESSMENT — PAIN DESCRIPTION - PAIN TYPE: TYPE: ACUTE PAIN

## 2022-02-02 ASSESSMENT — ENCOUNTER SYMPTOMS
NAUSEA: 0
PHOTOPHOBIA: 0
VOMITING: 0
SHORTNESS OF BREATH: 1
ABDOMINAL PAIN: 0

## 2022-02-02 ASSESSMENT — PAIN DESCRIPTION - LOCATION: LOCATION: CHEST

## 2022-02-02 ASSESSMENT — PAIN DESCRIPTION - DESCRIPTORS: DESCRIPTORS: SQUEEZING

## 2022-02-02 ASSESSMENT — PAIN DESCRIPTION - ORIENTATION: ORIENTATION: LEFT

## 2022-02-02 ASSESSMENT — PAIN DESCRIPTION - FREQUENCY: FREQUENCY: INTERMITTENT

## 2022-02-02 NOTE — ED PROVIDER NOTES
[facility]  Emergency Department  Faculty Attestation     I performed a history and physical examination of the patient and discussed management with the resident. I reviewed the residents note and agree with the documented findings and plan of care. Any areas of disagreement are noted on the chart. I was personally present for the key portions of any procedures. I have documented in the chart those procedures where I was not present during the key portions. I have reviewed the emergency nurses triage note. I agree with the chief complaint, past medical history, past surgical history, allergies, medications, social and family history as documented unless otherwise noted below. For Physician Assistant/ Nurse Practitioner cases/documentation I have personally evaluated this patient and have completed at least one if not all key elements of the E/M (history, physical exam, and MDM). Additional findings are as noted. Primary Care Physician:  Non-Staff Physician    Screenings:  [address]       Chief Complaint   Patient presents with    Headache    Shortness of Breath    Dizziness       RECENT VITALS:   Temp: 99.1 °F (37.3 °C),  Pulse: 108, Resp: 20, BP: (!) 155/104    LABS:  Labs Reviewed   COVID-19, RAPID   STROKE PANEL       Radiology  CT HEAD WO CONTRAST    (Results Pending)   CTA HEAD NECK W CONTRAST    (Results Pending)       CRITICAL CARE: There was a high probability of clinically significant/life threatening deterioration in this patient's condition which required my urgent intervention. Total critical care time was 10 minutes. This excludes any time for separately reportable procedures. EKG:      Attending Physician Additional  Notes    Patient's been ill for the past 3 days. She developed gradual onset severe right temporal headache pain on Sunday. This resolved spontaneously several hours later.   He returned the next day to involve the whole right side of her head which was unusual for her. She has had a headache intermittently since then but more so today associate with some photophobia nausea. No vomiting. She had disequilibrium or poor coordination intermittently through the day yesterday and today and then this afternoon approximately 330, now 3 hours earlier, she noticed weakness in her right lower extremity and difficulty going up the steps. No double vision. She has been having problems with finding words. No stiffness in her neck. No fevers chills or sweats. She has difficulty breathing and nonproductive cough. There is atypical pain in the left pectoral region without radiation. No other viral syndrome symptoms. She is not yet Covid vaccinated. No history of hypertension diabetes or atrial fibrillation. No history of migraines. On exam she is in moderate distress secondary to pain, GCS 15, tachycardic, hypertensive, afebrile. Neck is supple. Normal speech and mentation. Normal pupils and extraocular movements. No nystagmus. Gaze conjugate. Cranial nerves intact. Motor strength is diminished in the right lower extremity and she drifts down. No drift of the right upper extremity. Toes are downgoing. Cardiac exam is benign. Normal pulses. Impression is possible stroke, rule out subarachnoid hemorrhage, complicated migraine, viral syndrome. Plan is stroke alert, CT brain, CT angiogram, migraine cocktail, fluids, labs, reassess. Abeba Callejas.  Valerie Chapman MD, [facility]  Attending Emergency  Physician               Cari Zhang MD  02/02/22 5782

## 2022-02-02 NOTE — Clinical Note
Dejan Stephenson was seen and treated in our emergency department on 2/2/2022. She may return to work on 02/04/2022. If you have any questions or concerns, please don't hesitate to call.       Earma Rough, DO

## 2022-02-02 NOTE — FLOWSHEET NOTE
[address] [facility] [address]     Emergency/Trauma Note    PATIENT NAME: Tanya Marcelino    Shift date: 02/02/2022  Shift day: Wednesday   Shift # 2    Room # 15/15   Name: Tanya Marcelino            Age: 39 y.o. Gender: female          Oriental orthodox: Evangelical   Place of Restoration:     Trauma/Incident type: Stroke Alert  Admit Date & Time: 2/2/2022  5:56 PM  TRAUMA NAME: Bruno Chapin IN CHART? No    NAME OF DECISION MAKER:     RELATIONSHIP OF DECISION MAKER TO PATIENT:     PATIENT/EVENT DESCRIPTION:  Tanya Marcelino is a 39 y.o. female who arrived via spouse Layne Reynoso bringing her in as a stroke alert. . Pt to be admitted to 15/15. SPIRITUAL ASSESSMENT/INTERVENTION:  Patient was in the room surrounded by doctors and nurses drawing blood and gathering information from the patient. This  noticed that there was a gentleman sitting in the chair. Everyone quickly exited from the room and the patient was transported somewhere with the nurses. This  introduced herself to the gentleman and found out that he was the spouse, Layne Reynoso. They have five children. They have a deep harrison in God and believe in the power of prayer. He said that he had been praying for her there. He did not wish for this  to pray for him. He asked if it was commonplace for the  to be called in. This  reassured him that she was just there because she received an alert to go to the room. This  told him not to worry. This  said that the chaplains would be available 24/7 should he change his mind and desire further  services later on in the evening time. Layne Reynoso was grateful. This  said that she would be praying for him. He shook this 's hand. PATIENT BELONGINGS:  Given to Family    ANY BELONGINGS OF SIGNIFICANT VALUE NOTED:  Belongings with spouse, Layne Reynoso. REGISTRATION STAFF NOTIFIED?   No      WHAT IS YOUR SPIRITUAL CARE PLAN FOR THIS PATIENT?: This  will let the evening shift know about the patient; but the spouse did not wish to have  services at this time. Electronically signed by Angeles Pantoja on 2/2/2022 at 6:41 PM.  [address]  [phone]     02/02/22 8906   Encounter Summary   Services provided to: Family   Referral/Consult From: Multi-disciplinary team   Support System Spouse   Continue Visiting   (02/02/2022)   Complexity of Encounter High   Length of Encounter 30 minutes   Spiritual Assessment Completed Yes   Crisis   Type Trauma   Assessment Tearful;Passive; Hopeful;Coping; Shock  (Spouse Larry)   Intervention Active listening;Explored feelings, thoughts, concerns;Nurtured hope;Sustaining presence/ Ministry of presence;Empowerment; Discussed meaning/purpose;Discussed relationship with God   Outcome Connection/belonging;Comfort;Expressed gratitude;Engaged in conversation;Coping;Encouraged; Hopeful;Receptive

## 2022-02-02 NOTE — ED PROVIDER NOTES
[facility] ED  Emergency Department Encounter  EmergencyMedicine Resident     Pt Minh Mcrae  MRN: [id number]  [place] 1985  Date of evaluation: 2/2/22  PCP:  Zhang Tinajero Physician    This patient was evaluated in the Emergency Department for symptoms described in the history of present illness. The patient was evaluated in the context of the global COVID-19 pandemic, which necessitated consideration that the patient might be at risk for infection with the SARS-CoV-2 virus that causes COVID-19. Institutional protocols and algorithms that pertain to the evaluation of patients at risk for COVID-19 are in a state of rapid change based on information released by regulatory bodies including the CDC and federal and state organizations. These policies and algorithms were followed during the patient's care in the ED. CHIEF COMPLAINT       Chief Complaint   Patient presents with    Headache    Shortness of Breath    Dizziness       HISTORY OF PRESENT ILLNESS  (Location/Symptom, Timing/Onset, Context/Setting, Quality, Duration, Modifying Factors, Severity.)      Saurav Michaels is a 39 y.o. female who presents with pain, shortness of breath, dizziness, and right sided weakness. Patient states that her symptoms of right-sided weakness began approximately 2 hours ago at 1600 today. Symptoms of right lower extremity weakness or some lately greater than upper extremity. Patient is right-hand dominant. Patient states that that her symptoms began 2 to 3 days ago when she started having a right-sided headache is not normally get headaches began to have intermittent chest pain \"over her heart\". Has been associated shortness of breath. Patient has a history of asthma she is not wheezing.   History of sickle cell trait, obesity on  depoprovera  PAST MEDICAL / SURGICAL / SOCIAL / FAMILY HISTORY      has a past medical history of Asthma, Blood transfusion reaction, Chronic migraine without aura with status migrainosus, not intractable, Complication of anesthesia, History of blood transfusion, Microcytic anemia, and Sickle cell trait ([facility] [address] has a past surgical history that includes  section. Social History     Socioeconomic History    Marital status: Single     Spouse name: Not on file    Number of children: Not on file    Years of education: Not on file    Highest education level: Not on file   Occupational History    Not on file   Tobacco Use    Smoking status: Never Smoker    Smokeless tobacco: Never Used   Vaping Use    Vaping Use: Never used   Substance and Sexual Activity    Alcohol use: No    Drug use: No    Sexual activity: Yes     Partners: Male   Other Topics Concern    Not on file   Social History Narrative    Not on file     Social Determinants of Health     Financial Resource Strain:     Difficulty of Paying Living Expenses: Not on file   Food Insecurity:     Worried About Running Out of Food in the Last Year: Not on file    Lydia of Food in the Last Year: Not on file   Transportation Needs:     Lack of Transportation (Medical): Not on file    Lack of Transportation (Non-Medical):  Not on file   Physical Activity:     Days of Exercise per Week: Not on file    Minutes of Exercise per Session: Not on file   Stress:     Feeling of Stress : Not on file   Social Connections:     Frequency of Communication with Friends and Family: Not on file    Frequency of Social Gatherings with Friends and Family: Not on file    Attends Christianity Services: Not on file    Active Member of Clubs or Organizations: Not on file    Attends Club or Organization Meetings: Not on file    Marital Status: Not on file   Intimate Partner Violence:     Fear of Current or Ex-Partner: Not on file    Emotionally Abused: Not on file    Physically Abused: Not on file    Sexually Abused: Not on file   Housing Stability:     Unable to Pay for Housing in the Last Year: Not on file    Number of Places Lived in the Last Year: Not on file    Unstable Housing in the Last Year: Not on file       Family History   Problem Relation Age of Onset    Diabetes Father     Hypertension Father     Diabetes Mother     Hypertension Mother     Depression Mother     Mental Illness Mother     Other Sister         CP  complications     Heart Attack Brother     Cancer Maternal Aunt     Uterine Cancer Maternal Aunt     Breast Cancer Neg Hx     Colon Cancer Neg Hx     Eclampsia Neg Hx     Ovarian Cancer Neg Hx      Labor Neg Hx     Spont Abortions Neg Hx     Stroke Neg Hx        Allergies:  Patient has no known allergies. Home Medications:  Prior to Admission medications    Medication Sig Start Date End Date Taking? Authorizing Provider   acetaminophen (TYLENOL) 500 MG tablet Take 2 tablets by mouth every 6 hours as needed for Pain 22  Yes Fidencio Lou, DO   naproxen (NAPROSYN) 500 MG tablet Take 1 tablet by mouth 2 times daily (with meals) 22  Yes Bruce Sharen Osgood, DO   magnesium oxide (MAG-OX) 400 (240 Mg) MG tablet Take 1 tablet by mouth daily 22  Yes Bruce Sharen Osgood, DO   medroxyPROGESTERone (DEPO-PROVERA) 150 MG/ML injection Inject 1 mL into the muscle every 3 months    Wileen Castleman, DO   albuterol sulfate HFA (PROVENTIL HFA) 108 (90 Base) MCG/ACT inhaler Inhale 2 puffs into the lungs every 6 hours as needed for Wheezing 18   Kassandra , MD       REVIEW OF SYSTEMS    (2-9 systems for level 4, 10 or more for level 5)      Review of Systems   Constitutional: Positive for appetite change and fatigue. HENT: Negative for congestion. Eyes: Positive for visual disturbance. Negative for photophobia. Respiratory: Positive for shortness of breath. Cardiovascular: Positive for chest pain. Gastrointestinal: Negative for abdominal pain, nausea and vomiting. Genitourinary: Negative for flank pain.    Musculoskeletal: Negative for neck pain and neck stiffness. Skin: Negative for pallor, rash and wound. Allergic/Immunologic: Negative for environmental allergies and food allergies. Neurological: Positive for weakness and headaches. Hematological: Negative for adenopathy. Psychiatric/Behavioral: Negative for agitation and confusion. PHYSICAL EXAM   (up to 7 for level 4, 8 or more for level 5)      INITIAL VITALS:   BP (!) 151/98   Pulse 102   Temp 99.1 °F (37.3 °C) (Oral)   Resp 20   Ht 5' (1.524 m)   Wt 240 lb (108.9 kg)   SpO2 100%   BMI 46.87 kg/m²     Physical Exam  Vitals and nursing note reviewed. Constitutional:       Appearance: She is obese. She is not toxic-appearing. HENT:      Head: Normocephalic. Right Ear: External ear normal.      Left Ear: External ear normal.      Nose: Nose normal. No congestion. Mouth/Throat:      Mouth: Mucous membranes are moist.   Eyes:      General: No scleral icterus. Left eye: No discharge. Extraocular Movements: Extraocular movements intact. Conjunctiva/sclera: Conjunctivae normal.      Pupils: Pupils are equal, round, and reactive to light. Cardiovascular:      Rate and Rhythm: Regular rhythm. Tachycardia present. Pulses: Normal pulses. Pulmonary:      Effort: Pulmonary effort is normal. No respiratory distress. Abdominal:      Palpations: Abdomen is soft. Musculoskeletal:      Cervical back: Normal range of motion. Right lower leg: No edema. Left lower leg: No edema. Skin:     General: Skin is warm. Capillary Refill: Capillary refill takes less than 2 seconds. Neurological:      Mental Status: She is alert and oriented to person, place, and time.    Psychiatric:         Mood and Affect: Mood normal.         DIFFERENTIAL  DIAGNOSIS     PLAN (LABS / IMAGING / EKG):  Orders Placed This Encounter   Procedures    COVID-19, Rapid    CT HEAD WO CONTRAST    CTA HEAD NECK W CONTRAST    STROKE PANEL    ELECTROLYTES PLUS    Hemoglobin and hematocrit, blood    CALCIUM, IONIC (POC)    HCG Qualitative, Serum    Inpatient consult to Stroke Team    Venous Blood Gas, POC    Creatinine W/GFR Point of Care    POCT urea (BUN)    Lactic Acid, POC    POCT Glucose    EKG 12 Lead       MEDICATIONS ORDERED:  Orders Placed This Encounter   Medications    prochlorperazine (COMPAZINE) injection 10 mg    diphenhydrAMINE (BENADRYL) injection 25 mg    dexamethasone (DECADRON) injection 10 mg    iopamidol (ISOVUE-370) 76 % injection 90 mL    magnesium sulfate 1000 mg in dextrose 5% 100 mL IVPB    magnesium sulfate 1-5 GM/100ML-% IVPB (premix)     JIM WALTON: cabinet override    acetaminophen (TYLENOL) 500 MG tablet     Sig: Take 2 tablets by mouth every 6 hours as needed for Pain     Dispense:  20 tablet     Refill:  1    naproxen (NAPROSYN) 500 MG tablet     Sig: Take 1 tablet by mouth 2 times daily (with meals)     Dispense:  20 tablet     Refill:  1    magnesium oxide (MAG-OX) 400 (240 Mg) MG tablet     Sig: Take 1 tablet by mouth daily     Dispense:  10 tablet     Refill:  0       DDX: cva, viral illness, acs, atypical chest pain, complex migraine    DIAGNOSTIC RESULTS / EMERGENCY DEPARTMENT COURSE / MDM   LAB RESULTS:  Results for orders placed or performed during the hospital encounter of 02/02/22   COVID-19, Rapid    Specimen: Nasopharyngeal Swab   Result Value Ref Range    Specimen Description . NASOPHARYNGEAL SWAB     SARS-CoV-2, Rapid Not Detected Not Detected   STROKE PANEL   Result Value Ref Range    Glucose 92 70 - 99 mg/dL    BUN 8 6 - 20 mg/dL    CREATININE 0.90 0.50 - 0.90 mg/dL    Bun/Cre Ratio NOT REPORTED 9 - 20    Calcium 9.2 8.6 - 10.4 mg/dL    Sodium 135 135 - 144 mmol/L    Potassium 3.8 3.7 - 5.3 mmol/L    Chloride 103 98 - 107 mmol/L    CO2 17 (L) 20 - 31 mmol/L    Anion Gap 15 9 - 17 mmol/L    GFR Non-African American >60 >60 mL/min    GFR African American >60 >60 mL/min    GFR Comment          GFR Staging NOT REPORTED WBC 12.6 (H) 3.5 - 11.3 k/uL    RBC 5.55 (H) 3.95 - 5.11 m/uL    Hemoglobin 13.5 11.9 - 15.1 g/dL    Hematocrit 42.8 36.3 - 47.1 %    MCV 77.1 (L) 82.6 - 102.9 fL    MCH 24.3 (L) 25.2 - 33.5 pg    MCHC 31.5 28.4 - 34.8 g/dL    RDW 15.3 (H) 11.8 - 14.4 %    Platelets 510 874 - 342 k/uL    MPV 10.2 8.1 - 13.5 fL    NRBC Automated 0.0 0.0 per 100 WBC    Total CK 57 26 - 192 U/L    CK-MB <1.0 <5.4 ng/mL    % CKMB 1.8 0.0 - 3.0 %    CKMB Interpretation NORMAL ISOENZYME PATTERN     Differential Type NOT REPORTED     Seg Neutrophils 67 (H) 36 - 65 %    Lymphocytes 25 24 - 43 %    Monocytes 5 3 - 12 %    Eosinophils % 1 1 - 4 %    Basophils 1 0 - 2 %    Immature Granulocytes 1 (H) 0 %    Segs Absolute 8.54 (H) 1.50 - 8.10 k/uL    Absolute Lymph # 3.09 1.10 - 3.70 k/uL    Absolute Mono # 0.66 0.10 - 1.20 k/uL    Absolute Eos # 0.17 0.00 - 0.44 k/uL    Basophils Absolute 0.09 0.00 - 0.20 k/uL    Absolute Immature Granulocyte 0.06 0.00 - 0.30 k/uL    WBC Morphology NOT REPORTED     RBC Morphology ANISOCYTOSIS PRESENT     Platelet Estimate NOT REPORTED     Myoglobin <21 (L) 25 - 58 ng/mL    Protime 10.4 9.1 - 12.3 sec    INR 1.0     PTT 23.5 20.5 - 30.5 sec    Troponin, High Sensitivity <6 0 - 14 ng/L    Troponin T NOT REPORTED <0.03 ng/mL    Troponin Interp NOT REPORTED    ELECTROLYTES PLUS   Result Value Ref Range    POC Sodium 141 138 - 146 mmol/L    POC Potassium 3.8 3.5 - 4.5 mmol/L    POC Chloride 108 (H) 98 - 107 mmol/L    POC TCO2 24 22 - 30 mmol/L    Anion Gap 10 7 - 16 mmol/L   Hemoglobin and hematocrit, blood   Result Value Ref Range    POC Hemoglobin 15.8 12.0 - 16.0 g/dL    POC Hematocrit 47 (H) 36 - 46 %   CALCIUM, IONIC (POC)   Result Value Ref Range    POC Ionized Calcium 1.19 1.15 - 1.33 mmol/L   HCG Qualitative, Serum   Result Value Ref Range    hCG Qual NEGATIVE NEGATIVE   Venous Blood Gas, POC   Result Value Ref Range    pH, Luis Antonio 7.366 7.320 - 7.430    pCO2, Luis Antonio 40.9 (L) 41.0 - 51.0 mm Hg    pO2, Luis Antonio 31.9 30.0 - 50.0 mm Hg    HCO3, Venous 23.4 22.0 - 29.0 mmol/L    Total CO2, Venous NOT REPORTED 23.0 - 30.0 mmol/L    Negative Base Excess, Luis Antonio 2 0.0 - 2.0    Positive Base Excess, Luis Antonio NOT REPORTED 0.0 - 3.0    O2 Sat, Luis Antonio 59 (L) 60.0 - 85.0 %    O2 Device/Flow/% NOT REPORTED     Srinivas Test NOT REPORTED     Sample Site NOT REPORTED     Mode NOT REPORTED     FIO2 NOT REPORTED     Pt Temp NOT REPORTED     POC pH Temp NOT REPORTED     POC pCO2 Temp NOT REPORTED mm Hg    POC pO2 Temp NOT REPORTED mm Hg   Creatinine W/GFR Point of Care   Result Value Ref Range    POC Creatinine 0.82 0.51 - 1.19 mg/dL    GFR Comment >60 >60 mL/min    GFR Non-African American >60 >60 mL/min    GFR Comment         POCT urea (BUN)   Result Value Ref Range    POC BUN 7 (L) 8 - 26 mg/dL   Lactic Acid, POC   Result Value Ref Range    POC Lactic Acid 1.63 (H) 0.56 - 1.39 mmol/L   POCT Glucose   Result Value Ref Range    POC Glucose 92 74 - 100 mg/dL       IMPRESSION: Patient is an alert 78-year-old obese female who presents with sudden onset of focal deficits on the right side sensory changes present she has pure motor weakness right upper and right lower extremity. Has been feeling ill for last several days with headache intermittent chest pain pain or dyspnea she satting 100% on room air lungs are clear to auscultation bilaterally she is tachycardic in the low 100s on auscultation of her chest and mildly hypertensive. Plan will be CT head CTA head and neck stroke alert twelve-lead EKG stroke panel reevaluation    RADIOLOGY:  CT HEAD WO CONTRAST    Result Date: 2/2/2022  EXAMINATION: CT OF THE HEAD WITHOUT CONTRAST  2/2/2022 6:21 pm TECHNIQUE: CT of the head was performed without the administration of intravenous contrast. Dose modulation, iterative reconstruction, and/or weight based adjustment of the mA/kV was utilized to reduce the radiation dose to as low as reasonably achievable. COMPARISON: None.  HISTORY: ORDERING SYSTEM PROVIDED HISTORY: stroke alert TECHNOLOGIST PROVIDED HISTORY: stroke alert Decision Support Exception - unselect if not a suspected or confirmed emergency medical condition->Emergency Medical Condition (MA) Is the patient pregnant?->No Reason for Exam: stroke alert FINDINGS: BRAIN/VENTRICLES: There is no acute intracranial hemorrhage, mass effect or midline shift. No abnormal extra-axial fluid collection. The gray-white differentiation is maintained without evidence of an acute infarct. There is no evidence of hydrocephalus. ORBITS: The visualized portion of the orbits demonstrate no acute abnormality. SINUSES: The visualized paranasal sinuses and mastoid air cells demonstrate no acute abnormality. SOFT TISSUES/SKULL:  No acute abnormality of the visualized skull or soft tissues. No acute intracranial abnormality. No acute territorial infarction, intracranial hemorrhage or mass lesion. RECOMMENDATIONS: Unavailable     CTA HEAD NECK W CONTRAST    Result Date: 2/2/2022  EXAMINATION: CTA OF THE HEAD AND NECK WITH CONTRAST 2/2/2022 6:23 pm: TECHNIQUE: CTA of the head and neck was performed with the administration of intravenous contrast. Multiplanar reformatted images are provided for review. MIP images are provided for review. Stenosis of the internal carotid arteries measured using NASCET criteria. Dose modulation, iterative reconstruction, and/or weight based adjustment of the mA/kV was utilized to reduce the radiation dose to as low as reasonably achievable. COMPARISON: None. HISTORY: ORDERING SYSTEM PROVIDED HISTORY: stroke alert right sided weakness upper and lower L>U TECHNOLOGIST PROVIDED HISTORY: stroke alert right sided weakness upper and lower L>U Decision Support Exception - unselect if not a suspected or confirmed emergency medical condition->Emergency Medical Condition (MA) Reason for Exam: stroke alert FINDINGS: CTA NECK: AORTIC ARCH/ARCH VESSELS: No dissection or arterial injury.   No significant stenosis of the brachiocephalic or subclavian arteries. CAROTID ARTERIES: No dissection, arterial injury, or hemodynamically significant stenosis by NASCET criteria. VERTEBRAL ARTERIES: No dissection, arterial injury, or significant stenosis. Right vertebral artery is hypoplastic, anatomical variation. SOFT TISSUES: The lung apices are clear. No cervical or superior mediastinal lymphadenopathy. The larynx and pharynx are unremarkable. No acute abnormality of the salivary and thyroid glands. BONES: No acute osseous abnormality. CTA HEAD: ANTERIOR CIRCULATION: No significant stenosis of the intracranial internal carotid, anterior cerebral, or middle cerebral arteries. No aneurysm. Right ZO A1 segment is hypoplastic. POSTERIOR CIRCULATION: No significant stenosis of the vertebral, basilar, or posterior cerebral arteries. No aneurysm. OTHER: No dural venous sinus thrombosis on this non-dedicated study. BRAIN: No mass effect or midline shift. No extra-axial fluid collection. The gray-white differentiation is maintained. Unremarkable CTA of the head and neck. No hemodynamically significant lesion. No aneurysm. RECOMMENDATIONS: Unavailable       EMERGENCY DEPARTMENT COURSE:  ED Course as of 02/02/22 2023 Wed Feb 02, 2022   1826 Ct reviewed, no bleed [BG]   1910 Reevaluated resting comfortably feeling better [BG]      ED Course User Index  [BG] Otf Casarez,          PROCEDURES:      CONSULTS:  IP CONSULT TO STROKE TEAM    CRITICAL CARE:    FINAL IMPRESSION      1.  Acute right-sided weakness          DISPOSITION / PLAN     DISPOSITION  Dc with prescriptions for apap, naproxen and mag      PATIENT REFERRED TO:  [facility]  [address] [address]  [phone]  Go to   If symptoms worsen, As needed      DISCHARGE MEDICATIONS:  Discharge Medication List as of 2/2/2022  7:55 PM          Otf Casarez, DO  Emergency Medicine Resident    (Please note that portions of thisnote were completed with a voice recognition program.  Efforts were made to edit the dictations but occasionally words are mis-transcribed.)       Bethany Haas, DO  Resident  02/02/22 2023

## 2022-02-03 NOTE — CONSULTS
Department of Neurology/Endovascular neurology                                          Resident Stroke Consult  Note                                                       Reason for Consult:  Stroke evaluation   Endovascular Neurosurgeon:   []Dr. Anum Sky  [x]Dr. Jostin Umaña    History Obtained From:  patient    CHIEF COMPLAINT:       Right hemicranial headache of 3 days duration    HISTORY OF PRESENT ILLNESS:       The patient is a 39 y.o. female with past medical history of asthma on inhalers came into the ED complaining of right hemicranial headache that started 3 days ago, pulsating in nature, 10 out of 10, associated with photophobia and phonophobia, dizziness, nausea and blurred vision. Patient also felt right arm and leg heaviness associated with this headache. Her headache was continuous, she tried Tylenol which provided temporary relief. Staying in a dark quiet room seem to help with her headache. No exacerbating factors were appreciated. No head trauma, no loss of consciousness, no abnormal movements. Patient does not take any blood thinners. No past history of headaches, seizures, migraines, strokes. Last well-known was 3 days ago. BP 150s over 100, heart rate 90, oxygen saturation 100%. Blood glucose 92, NIH score 0. CT head, CTA negative for any acute intracranial pathologies, LVO, aneurysms    NIH Stroke Scale Total (if not done complete detailed one below):    1a.  Level of consciousness:  0 - alert; keenly responsive  1b. Level of consciousness questions:  0 - answers both questions correctly  1c. Level of consciousness questions:  0 - performs both tasks correctly  2. Best Gaze:  0 - normal  3. Visual:  0 - no visual loss  4. Facial Palsy:  0 - normal symmetric movement  5a. Motor left arm:  0 - no drift, limb holds 90 (or 45) degrees for full 10 seconds  5b. Motor right arm:  0 - no drift, limb holds 90 (or 45) degrees for full 10 seconds  6a.   Motor left le - no drift; leg holds 30 degree position for full 5 seconds  6b. Motor right le - no drift; leg holds 30 degree position for full 5 seconds  7. Limb Ataxia:  0 - absent  8. Sensory:  0 - normal; no sensory loss  9. Best Language:  0 - no aphasia, normal  10. Dysarthria:  0 - normal  11.   Extinction and Inattention:  0 - no abnormality  0%    Modified [place] Score Scale:     [x] Zero: No symptoms at all   [] 1: No significant disability despite symptoms; able to carry out all usual duties and activities   [] 2: Slight disability; unable to carry out all previous activities, but able to look after own affairs without assistance   [] 3:Moderate disability; requiring some help, but able to walk without assistance   [] 4: Moderately severe disability; unable to walk and attend to bodily needs without assistance   [] 5:Severe disability; bedridden, incontinent and requiring constant nursing care and attention      PAST MEDICAL HISTORY :       Past Medical History:        Diagnosis Date    Asthma     Blood transfusion reaction      after csection    Chronic migraine without aura with status migrainosus, not intractable     Complication of anesthesia     back pain for 1 year after the spinal     History of blood transfusion 2018    Microcytic anemia 3/3/2014    Sickle cell trait ([facility] [address] 2013       Past Surgical History:        Procedure Laterality Date     SECTION             Social History:   Social History     Socioeconomic History    Marital status: Single     Spouse name: Not on file    Number of children: Not on file    Years of education: Not on file    Highest education level: Not on file   Occupational History    Not on file   Tobacco Use    Smoking status: Never Smoker    Smokeless tobacco: Never Used   Vaping Use    Vaping Use: Never used   Substance and Sexual Activity    Alcohol use: No    Drug use: No    Sexual activity: Yes Partners: Male   Other Topics Concern    Not on file   Social History Narrative    Not on file     Social Determinants of Health     Financial Resource Strain:     Difficulty of Paying Living Expenses: Not on file   Food Insecurity:     Worried About Running Out of Food in the Last Year: Not on file    Lydia of Food in the Last Year: Not on file   Transportation Needs:     Lack of Transportation (Medical): Not on file    Lack of Transportation (Non-Medical): Not on file   Physical Activity:     Days of Exercise per Week: Not on file    Minutes of Exercise per Session: Not on file   Stress:     Feeling of Stress : Not on file   Social Connections:     Frequency of Communication with Friends and Family: Not on file    Frequency of Social Gatherings with Friends and Family: Not on file    Attends Cheondoism Services: Not on file    Active Member of Clubs or Organizations: Not on file    Attends Club or Organization Meetings: Not on file    Marital Status: Not on file   Intimate Partner Violence:     Fear of Current or Ex-Partner: Not on file    Emotionally Abused: Not on file    Physically Abused: Not on file    Sexually Abused: Not on file   Housing Stability:     Unable to Pay for Housing in the Last Year: Not on file    Number of [place] in the Last Year: Not on file    Unstable Housing in the Last Year: Not on file       Family History:       Problem Relation Age of Onset    Diabetes Father     Hypertension Father     Diabetes Mother     Hypertension Mother     Depression Mother     Mental Illness Mother     Other Sister         CP  complications     Heart Attack Brother     Cancer Maternal Aunt     Uterine Cancer Maternal Aunt     Breast Cancer Neg Hx     Colon Cancer Neg Hx     Eclampsia Neg Hx     Ovarian Cancer Neg Hx      Labor Neg Hx     Spont Abortions Neg Hx     Stroke Neg Hx        Allergies:  Patient has no known allergies.     Home Medications:  Prior to Admission medications    Medication Sig Start Date End Date Taking? Authorizing Provider   medroxyPROGESTERone (DEPO-PROVERA) 150 MG/ML injection Inject 1 mL into the muscle every 3 months 8/30/20   Hailey Hernandez DO   ibuprofen (ADVIL;MOTRIN) 600 MG tablet Take 1 tablet by mouth every 8 hours Please take up to 4-5 days or until menses ceases 2/15/21   Terri Grant DO   albuterol sulfate HFA (PROVENTIL HFA) 108 (90 Base) MCG/ACT inhaler Inhale 2 puffs into the lungs every 6 hours as needed for Wheezing 7/5/18   Laquita Cheung MD       Current Medications:   Current Facility-Administered Medications: magnesium sulfate 1000 mg in dextrose 5% 100 mL IVPB, 1,000 mg, IntraVENous, Once    REVIEW OF SYSTEMS:       CONSTITUTIONAL: negative for fatigue and malaise   EYES: Positive for double vision and photophobia    HEENT: negative for tinnitus and sore throat   RESPIRATORY: negative for cough, shortness of breath   CARDIOVASCULAR: negative for chest pain, palpitations   GASTROINTESTINAL: negative for nausea, vomiting   GENITOURINARY: negative for incontinence   MUSCULOSKELETAL: negative for neck or back pain   NEUROLOGICAL: negative for seizures   PSYCHIATRIC: negative for fatigue     Review of systems otherwise negative. PHYSICAL EXAM:       BP (!) 151/98   Pulse 102   Temp 99.1 °F (37.3 °C) (Oral)   Resp 20   Ht 5' (1.524 m)   Wt 240 lb (108.9 kg)   SpO2 100%   BMI 46.87 kg/m²      General Examination    General Resting comfortably in bed   Head Normocephalic, without obvious abnormality   Neck Supple, symmetrical. Good ROM. No midline or paraspinal tenderness. Lungs Respirations unlabored, no wheezing   Chest Wall No deformity   Heart RRR, no murmur   Abdomen Soft. Non-tender, non-distended   Extremities No cyanosis or edema or warmth.    Pulses 2+ and symmetric   Skin: Skin  turgor normal, no rashes or lesions       Neurological examination:    Mental status   Alert and oriented x 3; following all commands;   speech is fluent, no dysarthria, aphasia. Cranial nerves   II - visual fields intact to confrontation; pupils reactive  III, IV, VI - extraocular muscles intact; no JOSI; no nystagmus; no ptosis   V - normal facial sensation                                                               VII - normal facial symmetry                                                             VIII - intact hearing                                                                             IX, X - symmetrical palate elevation                                               XI - symmetrical shoulder shrug                                                       XII - midline tongue without atrophy or fasciculation     Motor function  Strength:   5/5 RUE, 5/5 RLE  5/5 LUE, 5/5  LLE  Normal bulk and tone. Sensory function Intact to touch, pin, vibration, proprioception throughout     Cerebellar Intact finger-nose-finger testing. Intact heel-shin testing. No dysdiadochokinesia present. No tremors                        Reflex function 2/4 symmetric throughout . Downgoing plantar response bilaterally.  (-)Hoyt's sign bilaterally      Gait                  Normal station and gait         LABS AND IMAGING:     CBC with Differential:    Lab Results   Component Value Date    WBC 12.6 02/02/2022    RBC 5.55 02/02/2022    HGB 13.5 02/02/2022    HCT 42.8 02/02/2022     02/02/2022    MCV 77.1 02/02/2022    MCH 24.3 02/02/2022    MCHC 31.5 02/02/2022    RDW 15.3 02/02/2022    LYMPHOPCT 25 02/02/2022    MONOPCT 5 02/02/2022    BASOPCT 1 02/02/2022    MONOSABS 0.66 02/02/2022    LYMPHSABS 3.09 02/02/2022    EOSABS 0.17 02/02/2022    BASOSABS 0.09 02/02/2022    DIFFTYPE NOT REPORTED 02/02/2022     BMP:    Lab Results   Component Value Date     02/02/2022    K 3.8 02/02/2022     02/02/2022    CO2 17 02/02/2022    BUN 8 02/02/2022    LABALBU 3.5 12/11/2018    CREATININE 0.90 02/02/2022    CREATININE 0.82 2022    CALCIUM 9.2 2022    GFRAA >60 2022    LABGLOM >60 2022    GLUCOSE 92 2022       ASSESSMENT AND PLAN:       Patient Active Problem List   Diagnosis    Asthma    BMI 47.6    History of  (G8)    Sickle cell trait     History of     Hx of SAB x4    History of blood transfusion    H/O Indicated PTD    Elevated 1hr GTT (normal 3hr)    Susceptible to varicella (non-immune), currently pregnant    Abnormal amniotic fluid in third trimester    Rh+/RI/GBS unk    Abnormal weight gain    Chronic migraine without aura with status migrainosus, not intractable    Increased insulin level    Insomnia    Memory impairment    s/p [place] ,     Abnormality in fetal heart rate/rhythm, antepartum condition or complication    Fetal heart rate decelerations affecting management of mother    Variable fetal heart rate decelerations, antepartum    Oligohydramnios, antepartum    Obesity affecting pregnancy in third trimester     18 M Apg 8/9 Wt 6#10     Postpartum care and examination       39 y.o. female with past medical history of asthma presented with right hemicranial headache associated with photophobia, phonophobia, nausea, blurry vision, dizziness and right side heaviness. Stroke alert was called. NIH 0, no focal findings on exam, BG normal. /100. Clinical scenario favoring complicated migraines. Follow up on the patient after migraine treatment completely resolved patient's symptoms      1. Last Known Well: 72 hours ago prior to presentation. 2. Candidate for IV tPA therapy     Yes []     No  [x] due to the following exclusion criteria: outside of window and the clinical scenario of complicated migraines     3.  Candidate for Thrombectomy    Yes []      No [x] due to the following exclusion criteria: No LVO     - Discussed with Dr. Burt Cea done, negative for acute findings   - CTA H/N negative for LVO  - Benadryl 25 mg IV and compazine 10 mg IV, magnesium 2 gm IV, decadron 10 mg IM  - MRI Brain WO and MRV to be done as outpatient   - We recommend SBP <140/90  - Blood glucose goal less than 180  - Please avoid dextrose containing solution  - Follow up as outpatient in neurology clinic with me after 3-4 weeks. Additional recommendations may follow  Please contact [facility] with any changes in patients neurologic status. Thank you for your consult.      Tali Wade MD   2/2/2022  7:23 PM

## 2022-02-05 LAB
EKG ATRIAL RATE: 102 BPM
EKG P AXIS: 62 DEGREES
EKG P-R INTERVAL: 122 MS
EKG Q-T INTERVAL: 344 MS
EKG QRS DURATION: 78 MS
EKG QTC CALCULATION (BAZETT): 448 MS
EKG R AXIS: 22 DEGREES
EKG T AXIS: 10 DEGREES
EKG VENTRICULAR RATE: 102 BPM

## 2022-02-05 PROCEDURE — 93010 ELECTROCARDIOGRAM REPORT: CPT | Performed by: INTERNAL MEDICINE

## 2022-02-16 ENCOUNTER — NURSE ONLY (OUTPATIENT)
Dept: OBGYN | Age: 37
End: 2022-02-16

## 2022-02-16 DIAGNOSIS — Z30.42 ENCOUNTER FOR DEPO-PROVERA CONTRACEPTION: Primary | ICD-10-CM

## 2022-02-16 RX ORDER — MEDROXYPROGESTERONE ACETATE 150 MG/ML
150 INJECTION, SUSPENSION INTRAMUSCULAR ONCE
Status: COMPLETED | OUTPATIENT
Start: 2022-02-16 | End: 2022-02-16

## 2022-02-16 RX ADMIN — MEDROXYPROGESTERONE ACETATE 150 MG: 150 INJECTION, SUSPENSION, EXTENDED RELEASE INTRAMUSCULAR at 11:22

## 2022-02-16 NOTE — PROGRESS NOTES
After obtaining consent, and per orders of Dr. Dorothea Rios, injection of Depo given in Right deltoid by Whitney Vogel MA. Patient instructed to remain in clinic for 20 minutes afterwards, and to report any adverse reaction to me immediately.

## 2022-05-05 ENCOUNTER — TELEPHONE (OUTPATIENT)
Dept: OBGYN | Age: 37
End: 2022-05-05

## 2022-05-11 ENCOUNTER — NURSE ONLY (OUTPATIENT)
Dept: OBGYN | Age: 37
End: 2022-05-11

## 2022-05-11 DIAGNOSIS — Z30.09 FAMILY PLANNING: Primary | ICD-10-CM

## 2022-05-11 RX ORDER — MEDROXYPROGESTERONE ACETATE 150 MG/ML
150 INJECTION, SUSPENSION INTRAMUSCULAR ONCE
Status: COMPLETED | OUTPATIENT
Start: 2022-05-11 | End: 2022-05-11

## 2022-05-11 RX ADMIN — MEDROXYPROGESTERONE ACETATE 150 MG: 150 INJECTION, SUSPENSION INTRAMUSCULAR at 11:02

## 2022-05-11 NOTE — PROGRESS NOTES
After obtaining consent, and per orders of Dr. Barney Dash, injection of Depo given in Left deltoid by Cintia Meade MA. Patient instructed to remain in clinic for 20 minutes afterwards, and to report any adverse reaction to me immediately.

## 2022-05-31 ENCOUNTER — OFFICE VISIT (OUTPATIENT)
Dept: OBGYN | Age: 37
End: 2022-05-31
Payer: COMMERCIAL

## 2022-05-31 ENCOUNTER — HOSPITAL ENCOUNTER (OUTPATIENT)
Age: 37
Setting detail: SPECIMEN
Discharge: HOME | End: 2022-05-31

## 2022-05-31 VITALS
DIASTOLIC BLOOD PRESSURE: 83 MMHG | HEART RATE: 98 BPM | WEIGHT: 241 LBS | BODY MASS INDEX: 40.15 KG/M2 | SYSTOLIC BLOOD PRESSURE: 120 MMHG | HEIGHT: 65 IN

## 2022-05-31 DIAGNOSIS — N94.6 DYSMENORRHEA: ICD-10-CM

## 2022-05-31 DIAGNOSIS — Z01.419 WELL WOMAN EXAM: Primary | ICD-10-CM

## 2022-05-31 DIAGNOSIS — Z11.3 SCREENING EXAMINATION FOR STD (SEXUALLY TRANSMITTED DISEASE): ICD-10-CM

## 2022-05-31 DIAGNOSIS — N93.9 ABNORMAL UTERINE BLEEDING (AUB): ICD-10-CM

## 2022-05-31 DIAGNOSIS — N64.4 PAINFUL LUMPY BREASTS: ICD-10-CM

## 2022-05-31 DIAGNOSIS — N63.0 PAINFUL LUMPY BREASTS: ICD-10-CM

## 2022-05-31 PROBLEM — O41.93X0 ABNORMAL AMNIOTIC FLUID IN THIRD TRIMESTER: Status: RESOLVED | Noted: 2018-11-29 | Resolved: 2022-05-31

## 2022-05-31 PROBLEM — O09.90 HIGH RISK PREGNANCY, ANTEPARTUM: Status: RESOLVED | Noted: 2018-12-03 | Resolved: 2022-05-31

## 2022-05-31 PROBLEM — O36.8390 VARIABLE FETAL HEART RATE DECELERATIONS, ANTEPARTUM: Status: RESOLVED | Noted: 2018-12-21 | Resolved: 2022-05-31

## 2022-05-31 PROBLEM — Z79.899 MEDICATION COURSE CHANGED: Status: RESOLVED | Noted: 2018-12-12 | Resolved: 2022-05-31

## 2022-05-31 PROBLEM — Z28.39 SUSCEPTIBLE TO VARICELLA (NON-IMMUNE), CURRENTLY PREGNANT: Status: RESOLVED | Noted: 2018-07-21 | Resolved: 2022-05-31

## 2022-05-31 PROBLEM — R63.5 ABNORMAL WEIGHT GAIN: Status: RESOLVED | Noted: 2017-02-02 | Resolved: 2022-05-31

## 2022-05-31 PROBLEM — O99.810 ABNORMAL GLUCOSE TOLERANCE TEST IN PREGNANCY: Status: RESOLVED | Noted: 2018-07-21 | Resolved: 2022-05-31

## 2022-05-31 PROBLEM — O99.213 OBESITY AFFECTING PREGNANCY IN THIRD TRIMESTER: Status: RESOLVED | Noted: 2019-01-11 | Resolved: 2022-05-31

## 2022-05-31 PROBLEM — O09.899 SUSCEPTIBLE TO VARICELLA (NON-IMMUNE), CURRENTLY PREGNANT: Status: RESOLVED | Noted: 2018-07-21 | Resolved: 2022-05-31

## 2022-05-31 PROBLEM — O41.00X0 OLIGOHYDRAMNIOS, ANTEPARTUM: Status: RESOLVED | Noted: 2019-01-11 | Resolved: 2022-05-31

## 2022-05-31 LAB
CANDIDA SPECIES, DNA PROBE: NEGATIVE
GARDNERELLA VAGINALIS, DNA PROBE: NEGATIVE
SOURCE: NORMAL
TRICHOMONAS VAGINALIS DNA: NEGATIVE

## 2022-05-31 PROCEDURE — 99395 PREV VISIT EST AGE 18-39: CPT | Performed by: STUDENT IN AN ORGANIZED HEALTH CARE EDUCATION/TRAINING PROGRAM

## 2022-05-31 RX ORDER — IBUPROFEN 600 MG/1
600 TABLET ORAL EVERY 6 HOURS PRN
Qty: 30 TABLET | Refills: 1 | Status: SHIPPED | OUTPATIENT
Start: 2022-05-31 | End: 2022-07-18

## 2022-06-01 NOTE — PROGRESS NOTES
[facility] OB/GYN Annual Visit    Wojciech Monk  5/31/2022                       Primary Care Physician: Non-Staff Physician    CC:   Chief Complaint   Patient presents with    Follow-up     breast pain and lumps in both breasts , pt is still breast feeding x few months          HPI: Wojciech Monk is a 39 y.o. female [id number]    The patient was seen and examined. She is here for an annual visit. She is complaining of multiple complaints. Patient's last menstrual period was 05/17/2022 (approximate). She denies irregular menstrual cycles, complains of heavy bleeding, and complains of dysmenorrhea. She has been on Depo Provera for 3 years and she has had irregular bleeding that only starts with exercising and sexual intercourse. Her bowel habits are regular. She denies any bloating. She denies dysuria. She denies urinary leaking. She denies vaginal discharge. She is sexually active with single partner, contraception - Depo-Provera injections. She uses Depo-Provera for contraception and is not desiring pregnancy. Patient also notes some lumps in bilateral breasts that are tender to palpation and can come and go over time. She denies any nipple discharge. She denies any caffeine use and reports her bra fitting was very recent. She denies wearing a supportive bra overnight. Pt is currently breast feeding.     Depression Screen: Symptoms of decreased mood absent  Symptoms of anhedonia absent  **If either question is answered in a  positive fashion then complete the PHQ9 Scoring Evaluation and make the appropriate referral**    REVIEW OF SYSTEMS:   Constitutional: negative fever, negative chills, negative weight changes   HEENT: negative visual disturbances, negative headaches, negative dizziness  Breast: negative breast abnormalities, +breast lumps that are tender, negative nipple discharge  Respiratory: negative dyspnea, negative cough, negative SOB  Cardiovascular: negative chest pain,  negative 7 IAB               Birth Comments: no D&C   6 2009           5A  10/19/08 36w0d  5 lb 4 oz (2.381 kg) M CS-LTranv   SUN      Birth Comments: twins - malpresentation, spontaneous labor   5B  10/19/08   4 lb 8 oz (2.041 kg) F CS-LTranv   SUN   4 Term 07 39w0d  5 lb 10 oz (2.551 kg) F Vag-Spont   SUN   3 2004           2 2004           1 2004              Obstetric Comments   Same partner with all of her pregnancies        PAST MEDICAL HISTORY:   has a past medical history of Asthma, Blood transfusion reaction, Chronic migraine without aura with status migrainosus, not intractable, Complication of anesthesia, History of blood transfusion, Microcytic anemia, and Sickle cell trait ([facility] [address] PAST SURGICAL HISTORY:   has a past surgical history that includes  section. ALLERGIES:  has No Known Allergies. MEDICATIONS:  Prior to Admission medications    Medication Sig Start Date End Date Taking?  Authorizing Provider   ibuprofen (ADVIL;MOTRIN) 600 MG tablet Take 1 tablet by mouth every 6 hours as needed for Pain 22  Yes Terri Grant,    acetaminophen (TYLENOL) 500 MG tablet Take 2 tablets by mouth every 6 hours as needed for Pain 22  Yes Fidencio Lou, DO   naproxen (NAPROSYN) 500 MG tablet Take 1 tablet by mouth 2 times daily (with meals) 22  Yes Eulalio Faria, DO   medroxyPROGESTERone (DEPO-PROVERA) 150 MG/ML injection Inject 1 mL into the muscle every 3 months 74  Yes Jaimie Gordillo,    albuterol sulfate HFA (PROVENTIL HFA) 108 (90 Base) MCG/ACT inhaler Inhale 2 puffs into the lungs every 6 hours as needed for Wheezing 18  Yes Gallito Schultz MD   magnesium oxide (MAG-OX) 400 (240 Mg) MG tablet Take 1 tablet by mouth daily  Patient not taking: Reported on 2022   Eulalio Faria DO       FAMILY HISTORY:  Family History of Breast, Ovarian, Colon or Uterine Cancer: No   family history includes Cancer in her maternal aunt; Depression in her mother; Diabetes in her father and mother; Heart Attack in her brother; Hypertension in her father and mother; Mental Illness in her mother; Other in her sister; Uterine Cancer in her maternal aunt. SOCIAL HISTORY:   reports that she has never smoked. She has never used smokeless tobacco. She reports that she does not drink alcohol and does not use drugs. VITALS:  Vitals:    05/31/22 1437   BP: 120/83   Pulse: 98   Weight: 241 lb (109.3 kg)   Height: 5' 5\" (1.651 m)     PHYSICAL EXAM:   Chaperone for Intimate Exam: Chaperone was present for entire exam, Chaperone Name: Abner Paul    General Appearance: Appears healthy. Alert; in no acute distress. Pleasant. Skin: Skin color, texture, turgor normal. No rashes or lesions. Lymphatic: No abnormally enlarged lymph nodes. HEENT: Normocephalic and atraumatic, Thyroid normal to inspection and palpation and non-palpable. Respiratory: Normal expansion. Clear to auscultation. No rales, rhonchi, or wheezing. Cardiovascular: Normal rate, regular rhythm, normal S1 and S2, no murmurs, rubs or gallops. Breast: Inspection negative, No nipple retraction or dimpling, No nipple discharge or bleeding, No axillary or supraclavicular adenopathy, 1-2cm likely enlarged duct immediately behind areola slightly tender to palpation noted bilaterally, no evidence of erythema/edema or signs of mastitis   Abdomen: Soft, non-tender, no rebound, guarding, rigidity, non-distended  Pelvic Exam:   Sterile Speculum Exam:   External genitalia: Normal hair distribution, normal appearing vulva, no masses, tenderness or lesions, normal clitoris, normal urethral meatus. Vagina: Normal appearing vaginal mucosa without lesions, physiologic appearing vaginal discharge noted in the posterior vault. Cervix: Normal appearing cervix without lesions.    Sterile Vaginal Exam:  Cervix: No cervical motion tenderness   Uterus: Normal size, shape, consistency and non-tender, anteverted uterus   Adnexa: Normal size, non-tender, no palpable masses  Rectal Exam: Exam declined by patient. Musculoskeletal: No joint swelling, deformity, or tenderness. , no gross abnormalities. Extremities: Non-tender BLE and non-edematous. Psych: Oriented to time, place and person, mood and affect are within normal limits. DATA:  Narrative   EXAMINATION:   PELVIC ULTRASOUND       3/19/2021       TECHNIQUE:   Transabdominal and transvaginal pelvic ultrasound was performed.       COMPARISON:   Pelvic ultrasound Luanne 15, 2016.       HISTORY:   ORDERING SYSTEM PROVIDED HISTORY: Abnormal uterine bleeding   TECHNOLOGIST PROVIDED HISTORY:   This procedure can be scheduled via [employer].  Access your [employer] account by   visiting [employer]   abnormal uterine bleeding   Reason for Exam: abnormal uterine bleeding       FINDINGS:       Measurements:       Uterus:  8.9 x 6.6 x 4.9 cm       Endometrial stripe:  6 mm       Right Ovary:  2.3 x 1.5 x 1.7 cm       Left Ovary:  3.0 x 1.7 x 1.8 cm           Ultrasound Findings:       Uterus: Heterogenous in echotexture without measurable fibroid.       Endometrial stripe: Trace fluid seen within the endometrial canal.  No   abnormal thickening.       Right Ovary: Right ovary is within normal limits.       Left Ovary:  Left ovary is within normal limits.       Free Fluid: No evidence of free fluid.           Impression   Heterogenous appearing uterus.  No measurable fibroid.  This can be further   evaluated by MRI.       No abnormal endometrial stripe thickening.       Normal ovaries.        ASSESSMENT & PLAN:    Ashly Rivers is a 39 y.o. female [id number] here for annual exam    - She declined the Garidisil immunization   - She declined the [place] immunization   - Last pap smear 7/23/18 negative with HPV negative, due next 2023   - Vaginitis and GC/C obtained for STD testing, pt declining any other STD testing at this time    AUB/Dysmenorrhea   - Due to persistent AUB for 3 years on Depo, discussed Endosee hysteroscopy, possible polypectomy, endometrial biopsy, and Mirena IUD placement, pt agreeable at this time   - Pt requesting more extra strength motrin to be sent to pharmacy    Breast pain/masses   - High suspicion for enlarged/engorged ducts while breast feeding, due to intermittent pain and patient reporting intermittent masses, will order breast ultrasounds bilaterally with possible diagnostic mammogram for further investigation    Patient Active Problem List    Diagnosis Date Noted    History of  (G8) 2014     Priority: High             Asthma 2014     Priority: Low     Mild intermittent, On long acting and rescue inhaler. Overview:   Overview:   Mild intermittent, controlled  Albuterol prn  Never been hospitalized. Denies attacks.   18 M Apg 8/9 Wt 6#10  2019    Sickle cell trait  2018     Same Partner. Pt reports that her partner Indira Geiger is neg for trait. Consider testing partner if not already done.  History of  2018     G7:  for twin gestation       Hx of SAB x4 2018    History of blood transfusion 2018     2008 PP Twin delivery       H/O Indicated PTD 2018     Indicated- twin delivery       Insomnia 2017    Memory impairment 2017    Increased insulin level 2016    Chronic migraine without aura with status migrainosus, not intractable 2016    BMI 47.6 2014     ASA 81mg daily   testing to start at 32 weeks per M recommendation  18- Early diabetic screening. 1 hr gtt ordered         Return in about 4 weeks (around 2022) for [facility] hysteroscopy, possible polypectomy, endometrial biopsy, Mirena IUD placement.   Risk Factors- Same partner,   [place], Asthma, + sickle cell trait,  H/O prim C/S,  H/O of twin preg, H/O indicated  delivery due to twins,  H/O successful  x1,  obesity, H/O blood transfusion, h/o SAB's x4. Obesity   Patient partner- Afsaneh Gauthier ( please asked for his  ). Pt reports he is  Neg for trait, need documentation or testing. Counseling Completed:    Counseled about need for repeat pap as per American Society for Colposcopy and Cervical Pathology guidelines. Counseled about need for mammograms every 1 year, If >42 yo and last mammogram was negative. Did not  about Calcium and Vitamin D dosing. Did not  about need for colonoscopy screening as well as onset for bone density testing. Counseled about birth control and barrier recommendations. Counseled about STD counseling and prevention. Counseled about Gardisil counseling for all patients 10-37 yo. Counseled about Hereditary Breast, Ovarian, Colon and Uterine Cancer screening. Tobacco & Secondary smoke risks done; with recommendation for cessation and avoidance. Routine health maintenance per patients PCP done. Patient was seen with total face to face time of 30 minutes. More than 50% of this visit was on counseling and education regarding the problems listed below and her options. She was also counseled on her preventative health maintenance recommendations and follow-up. Diagnosis Orders   1. Well woman exam     2. Painful lumpy breasts  US BREAST COMPLETE LEFT    US BREAST COMPLETE RIGHT    COREY DIGITAL DIAGNOSTIC W OR WO CAD BILATERAL   3. Screening examination for STD (sexually transmitted disease)  Chlamydia Trachomatis & Neisseria gonorrhoeae (GC) by amplified detection    Vaginitis DNA Probe   4.  Abnormal uterine bleeding (AUB)          Doreen Naylor DO  Ob/Gyn Resident  [facility], [address]  2022, 10:22 PM

## 2022-07-07 ENCOUNTER — HOSPITAL ENCOUNTER (OUTPATIENT)
Age: 37
Setting detail: SPECIMEN
Discharge: HOME | End: 2022-07-07

## 2022-07-07 ENCOUNTER — PROCEDURE VISIT (OUTPATIENT)
Dept: OBGYN | Age: 37
End: 2022-07-07
Payer: COMMERCIAL

## 2022-07-07 VITALS
SYSTOLIC BLOOD PRESSURE: 113 MMHG | WEIGHT: 236.25 LBS | DIASTOLIC BLOOD PRESSURE: 88 MMHG | HEIGHT: 60 IN | BODY MASS INDEX: 46.38 KG/M2 | HEART RATE: 102 BPM

## 2022-07-07 DIAGNOSIS — N93.9 ABNORMAL UTERINE BLEEDING (AUB): Primary | ICD-10-CM

## 2022-07-07 DIAGNOSIS — Z30.430 ENCOUNTER FOR IUD INSERTION: ICD-10-CM

## 2022-07-07 PROCEDURE — 88305 TISSUE EXAM BY PATHOLOGIST: CPT

## 2022-07-07 PROCEDURE — 58300 INSERT INTRAUTERINE DEVICE: CPT | Performed by: OBSTETRICS & GYNECOLOGY

## 2022-07-07 PROCEDURE — 58558 HYSTEROSCOPY BIOPSY: CPT | Performed by: OBSTETRICS & GYNECOLOGY

## 2022-07-07 NOTE — PROGRESS NOTES
[facility] OB/GYN Procedure note  7/7/2022                       Primary Care Physician: Non-Staff Physician      Subjective:   Sylvia Frnak 39 y.o. female [id number] is here for previously scheduled Endo see hysteroscopy with endometrial biopsy and IUD insertion due to history of AUB and dysmenorrhea. No LMP recorded. Patient has had an injection. . She has no complaints today. Vitals:   Blood pressure 113/88, pulse (!) 102, height 5' (1.524 m), weight 236 lb 4 oz (107.2 kg), currently breastfeeding. Lab:  Pregnancy Test:  Lab Results   Component Value Date    PREGTESTUR NEGATIVE 04/06/2021    HCGQUANT <1 02/15/2021       Diagnostics:  No results found. Procedure: Endosee Hysteroscopy with Endometrial biopsy, Insertion of Mirena IUD    Indications: 40 yo female with history of irregular menstrual cycles and heavy bleeding. She also reports dysmenorrhea. Procedure Details:     Chaperone for Intimate Exam: Chaperone was present for entire exam, Chaperone Name: Abner Mishra    The patient was counseled on the procedure. Risks, benefits and alternatives were reviewed. The patient is aware that this is diagnostic and not curative and a second procedure may be needed. A consent was reviewed and obtained. The patient was positioned comfortably on the exam table. A sterile speculum was placed into the vagina and the cervix was identified. The cervix was cleansed with betadine. The uterus and the cervix were sounded and measured 8 cm. The Endosee hysteroscope was inserted into the uterine cavity without difficulty showing unremarkable endocervical and endometrial cavity, no fibroids or polyps were seen. Endosee hysteroscopy device was removed without difficulty. The aspirator was then gently passed into the endometrial cavity without difficulty and the endometrial cavity was sampled in the usual fashion. The Mirena IUD was opened and loaded into the delivery system.  The wand was inserted just past the internal portio and the button was retracted to the first line. The wand was held in place for 10 seconds and then the button was retracted to its final position while the IUD was moved to the fundus. The string was trimmed in standard fashion. The speculum was then removed. The patient tolerated the procedure without difficulty. Tissue was obtained and sent to pathology. Hemostasis was visualized. All instruments were then removed. Pictures were obtained and uploaded to media. The patient tolerated the procedure well. Post procedure restrictions were reviewed and given to the patient. All counts and instruments were correct at the end of the procedure. Dr. Jay Stevenson was present for entire procedure. ASSESSMENT:  Harley Cook 39 y.o. female [id number] here for [facility] Hysteroscopy with Endometrial biopsy and IUD insertion secondary to AUB and Dysmenorrhea   - VSS, Afebrile   -Procedure completed without difficulty. Specimen sent to pathology   - Mirena IUD LOT # [id number] EXP OCT 2024   -We will have patient follow-up in 2 weeks for phone visit   -Pictures were uploaded to media section    Patient Active Problem List    Diagnosis Date Noted    History of  (G8) 2014     Priority: High             Abnormal uterine bleeding (AUB) 2022     Priority: Medium    Dysmenorrhea 2022     Priority: Medium    Painful lumpy breasts 2022     Priority: Medium    Asthma 2014     Priority: Low     Mild intermittent, On long acting and rescue inhaler. Overview:   Overview:   Mild intermittent, controlled  Albuterol prn  Never been hospitalized. Denies attacks.   18 M Apg 8/9 Wt 6#10  2019    Sickle cell trait  2018     Same Partner. Pt reports that her partner Carin Ramirez is neg for trait. Consider testing partner if not already done.        History of  2018     G7: 2008 for twin gestation       Hx of SAB x4 2018    History of blood transfusion 2018     2008 PP Twin delivery       H/O Indicated PTD 2018     Indicated- twin delivery       Insomnia 2017    Memory impairment 2017    Increased insulin level 2016    Chronic migraine without aura with status migrainosus, not intractable 2016    BMI 47.6 2014     ASA 81mg daily   testing to start at 32 weeks per [facility] recommendation  18- Early diabetic screening. 1 hr gtt ordered         Family Planning Counseling Completed  Barrier Recommendations reviewed  No tampons; connor-pads only x 8 weeks reviewed  Annual health follow-up: 1    The patient was counseled on follow up and home care with restrictions noted. Post procedure restrictions were reviewed and given to the patient. She was instructed to use barrier protection for sexually transmitted disease prevention as well as string checks/timing. She was instructed to abstain for two weeks and use connor-pads for the first 8 weeks post procedure. She is to notify the office or go to the nearest Emergency Department if she experiences Abdominal Pain, Temperatures more than 100.4 F, Odiferous Vaginal Discharge, Dizziness or Shortness of breath. PLAN:  Return in about 2 weeks (around 2022) for [address] phone visit . Toi Swift DO  Ob/Gyn Resident  [facility], [address]  2022, 5:52 PM         Attending Physician Statement  I have discussed the care of [facility], including pertinent history and exam findings,  with the resident. I have seen and examined the patient and the key elements of all parts of the encounter have been performed by me. I agree with the assessment, plan and orders as documented by the resident.   (GC Modifier)

## 2022-07-11 LAB — SURGICAL PATHOLOGY REPORT: NORMAL

## 2022-07-18 RX ORDER — IBUPROFEN 600 MG/1
600 TABLET ORAL EVERY 6 HOURS PRN
Qty: 30 TABLET | Refills: 1 | Status: SHIPPED | OUTPATIENT
Start: 2022-07-18

## 2022-07-26 ENCOUNTER — HOSPITAL ENCOUNTER (OUTPATIENT)
Dept: MAMMOGRAPHY | Age: 37
Discharge: HOME | End: 2022-07-28
Payer: COMMERCIAL

## 2022-07-26 ENCOUNTER — HOSPITAL ENCOUNTER (OUTPATIENT)
Dept: ULTRASOUND IMAGING | Age: 37
Discharge: HOME | End: 2022-07-28
Payer: COMMERCIAL

## 2022-07-26 DIAGNOSIS — N64.4 PAINFUL LUMPY BREASTS: ICD-10-CM

## 2022-07-26 DIAGNOSIS — N63.0 PAINFUL LUMPY BREASTS: ICD-10-CM

## 2022-07-26 PROCEDURE — G0279 TOMOSYNTHESIS, MAMMO: HCPCS

## 2022-07-26 PROCEDURE — 76642 ULTRASOUND BREAST LIMITED: CPT

## 2022-07-28 ENCOUNTER — SCHEDULED TELEPHONE ENCOUNTER (OUTPATIENT)
Dept: OBGYN | Age: 37
End: 2022-07-28
Payer: COMMERCIAL

## 2022-07-28 DIAGNOSIS — N93.9 ABNORMAL UTERINE BLEEDING (AUB): Primary | ICD-10-CM

## 2022-07-28 PROCEDURE — 99441 PR PHYS/QHP TELEPHONE EVALUATION 5-10 MIN: CPT | Performed by: OBSTETRICS & GYNECOLOGY

## 2022-07-28 PROCEDURE — 99211 OFF/OP EST MAY X REQ PHY/QHP: CPT | Performed by: STUDENT IN AN ORGANIZED HEALTH CARE EDUCATION/TRAINING PROGRAM

## 2022-07-28 NOTE — PROGRESS NOTES
Lizeth Olivo is a 39 y.o. female evaluated via telephone on 7/28/2022 for Results (EMB Results)    Discussed results of Endosee/EMB with patient. Her results showed benign endometrial tissue. Patient had IUD placed 7/8/2022, following EMB. She denies any issues or concerns with her IUD. All of her questions were answered. Patient denies vaginal bleeding, irritation, itching, hematuria, dysuria, chest pain, SOB, F/C, N/V/D, HA, RUQ pain, visual changes. We will have patient come back for her annual exam 5/2023. Documentation:  I communicated with the patient and/or health care decision maker about EMB results. Total Time: minutes: 5-10 minutes    Lizeth Olivo was evaluated through a synchronous (real-time) audio encounter. Patient identification was verified at the start of the visit. She (or guardian if applicable) is aware that this is a billable service, which includes applicable co-pays. This visit was conducted with the patient's (and/or legal guardian's) verbal consent. She has not had a related appointment within my department in the past 7 days or scheduled within the next 24 hours. The patient was [facility] [address]. The provider was located at Home ([address]: [place].     Note: not billable if this call serves to triage the patient into an appointment for the relevant concern    Rachele Colon DO

## 2022-09-12 ENCOUNTER — OFFICE VISIT (OUTPATIENT)
Dept: FAMILY MEDICINE CLINIC | Age: 37
End: 2022-09-12
Payer: COMMERCIAL

## 2022-09-12 VITALS
DIASTOLIC BLOOD PRESSURE: 77 MMHG | WEIGHT: 237 LBS | BODY MASS INDEX: 46.29 KG/M2 | SYSTOLIC BLOOD PRESSURE: 112 MMHG | TEMPERATURE: 97.8 F | HEART RATE: 96 BPM

## 2022-09-12 DIAGNOSIS — M25.551 RIGHT HIP PAIN: Primary | ICD-10-CM

## 2022-09-12 DIAGNOSIS — J45.20 MILD INTERMITTENT ASTHMA WITHOUT COMPLICATION: ICD-10-CM

## 2022-09-12 PROCEDURE — 99213 OFFICE O/P EST LOW 20 MIN: CPT

## 2022-09-12 PROCEDURE — 1036F TOBACCO NON-USER: CPT

## 2022-09-12 PROCEDURE — G8428 CUR MEDS NOT DOCUMENT: HCPCS

## 2022-09-12 PROCEDURE — G8417 CALC BMI ABV UP PARAM F/U: HCPCS

## 2022-09-12 RX ORDER — IBUPROFEN 600 MG/1
600 TABLET ORAL EVERY 6 HOURS PRN
Qty: 30 TABLET | Refills: 1 | Status: CANCELLED | OUTPATIENT
Start: 2022-09-12

## 2022-09-12 RX ORDER — NAPROXEN 500 MG/1
500 TABLET ORAL 2 TIMES DAILY WITH MEALS
Qty: 60 TABLET | Refills: 0 | Status: SHIPPED | OUTPATIENT
Start: 2022-09-12

## 2022-09-12 RX ORDER — ALBUTEROL SULFATE 90 UG/1
2 AEROSOL, METERED RESPIRATORY (INHALATION) EVERY 6 HOURS PRN
Qty: 1 EACH | Refills: 3 | Status: SHIPPED | OUTPATIENT
Start: 2022-09-12

## 2022-09-12 SDOH — HEALTH STABILITY: PHYSICAL HEALTH: ON AVERAGE, HOW MANY MINUTES DO YOU ENGAGE IN EXERCISE AT THIS LEVEL?: 20 MIN

## 2022-09-12 SDOH — HEALTH STABILITY: PHYSICAL HEALTH: ON AVERAGE, HOW MANY DAYS PER WEEK DO YOU ENGAGE IN MODERATE TO STRENUOUS EXERCISE (LIKE A BRISK WALK)?: 1 DAY

## 2022-09-12 ASSESSMENT — PATIENT HEALTH QUESTIONNAIRE - PHQ9
4. FEELING TIRED OR HAVING LITTLE ENERGY: 0
2. FEELING DOWN, DEPRESSED OR HOPELESS: 0
8. MOVING OR SPEAKING SO SLOWLY THAT OTHER PEOPLE COULD HAVE NOTICED. OR THE OPPOSITE, BEING SO FIGETY OR RESTLESS THAT YOU HAVE BEEN MOVING AROUND A LOT MORE THAN USUAL: 0
SUM OF ALL RESPONSES TO PHQ QUESTIONS 1-9: 1
10. IF YOU CHECKED OFF ANY PROBLEMS, HOW DIFFICULT HAVE THESE PROBLEMS MADE IT FOR YOU TO DO YOUR WORK, TAKE CARE OF THINGS AT HOME, OR GET ALONG WITH OTHER PEOPLE: 0
SUM OF ALL RESPONSES TO PHQ QUESTIONS 1-9: 1
7. TROUBLE CONCENTRATING ON THINGS, SUCH AS READING THE NEWSPAPER OR WATCHING TELEVISION: 1
SUM OF ALL RESPONSES TO PHQ QUESTIONS 1-9: 1
5. POOR APPETITE OR OVEREATING: 0
3. TROUBLE FALLING OR STAYING ASLEEP: 0
SUM OF ALL RESPONSES TO PHQ QUESTIONS 1-9: 1
6. FEELING BAD ABOUT YOURSELF - OR THAT YOU ARE A FAILURE OR HAVE LET YOURSELF OR YOUR FAMILY DOWN: 0
9. THOUGHTS THAT YOU WOULD BE BETTER OFF DEAD, OR OF HURTING YOURSELF: 0

## 2022-09-12 ASSESSMENT — ENCOUNTER SYMPTOMS: BACK PAIN: 1

## 2022-09-12 NOTE — PROGRESS NOTES
Subjective:    Bhavna Mello is a 40 y.o. female with  has a past medical history of Asthma, Blood transfusion reaction, Chronic migraine without aura with status migrainosus, not intractable, Complication of anesthesia, History of blood transfusion, Microcytic anemia, and Sickle cell trait ([facility] [address] Presented to the office today for:  Chief Complaint   Patient presents with    Hip Pain     Patient is having right hip pain 8/10       Hip Pain       This is a 62years old female presented to the office today to establish care. Patient reported 2 weeks history of right hip pain started suddenly upon waking from sleep, worse with walking, improves with rest.  Patient has a history of knee dislocation otherwise denied arthritis in the past.  Patient vidal pending stiffness of the small joints pain. History of iritis in the family, no autoimmune disease that she is aware of. No history of thyroid issue. No recent trauma. Ibuprofen provided minimal relief, the hip pain affect her activities of daily living associated with numbness in the right thigh but no weakness. No previous issues with the hip in the past.  However patient was previously on Depo-Provera ejections for 3 years last dose 3 months ago and currently she is on IUD for birth control, patient was not tested before for vitamin D not on multivitamins or vitamin D supplements. Review of Systems   Constitutional:  Negative for chills, diaphoresis, fatigue, fever and unexpected weight change. Musculoskeletal:  Positive for back pain and gait problem. Negative for joint swelling, neck pain and neck stiffness.         Right hip pain  Right thigh numbness  Right-sided back pain               The patient has a   Family History   Problem Relation Age of Onset    Diabetes Father     Hypertension Father     Diabetes Mother     Hypertension Mother     Depression Mother     Mental Illness Mother     Other Sister         CP  complications     Heart Attack Brother Cancer Maternal Aunt     Uterine Cancer Maternal Aunt     Breast Cancer Neg Hx     Colon Cancer Neg Hx     Eclampsia Neg Hx     Ovarian Cancer Neg Hx      Labor Neg Hx     Spont Abortions Neg Hx     Stroke Neg Hx        Objective:    /77 (Site: Left Upper Arm, Position: Sitting, Cuff Size: Large Adult)   Pulse 96   Temp 97.8 °F (36.6 °C) (Temporal)   Wt 237 lb (107.5 kg)   LMP  (LMP Unknown)   BMI 46.29 kg/m²    BP Readings from Last 3 Encounters:   22 112/77   22 113/88   22 120/83       Physical Exam  Musculoskeletal:         General: Tenderness present. No swelling, deformity or signs of injury. Right lower leg: No edema. Left lower leg: No edema. Comments: Bilateral hip exam  Restricted ROM both passive and active due to pain  Tenderness at the right lateral hip  Sensation intact  No swelling around the hips     Neurological:      Mental Status: She is oriented to person, place, and time. Lab Results   Component Value Date    WBC 12.6 (H) 2022    HGB 13.5 2022    HCT 42.8 2022     2022    ALT 7 2018    AST 11 2018     2022    K 3.8 2022     2022    CREATININE 0.90 2022    BUN 8 2022    CO2 17 (L) 2022    TSH 1.25 02/15/2021    INR 1.0 2022    GLUF 76 10/31/2018     Lab Results   Component Value Date    CALCIUM 9.2 2022     No results found for: LDLCALC, LDLCHOLESTEROL, LDLDIRECT    Assessment and Plan:    1. Right hip pain  -We will check vitamin D level, will start with 2 weeks of naproxen for pain management consent I will order for x-ray of the right hip.  - XR HIP RIGHT (2-3 VIEWS); Future  - Vitamin D 25 Hydroxy; Future  - [facility]  - Hemoglobin A1C; Future    2. Mild intermittent asthma without complication  - albuterol sulfate HFA (PROVENTIL HFA) 108 (90 Base) MCG/ACT inhaler;  Inhale 2 puffs into the lungs every 6 hours as needed for Wheezing  Dispense: 1 each; Refill: 3    Requested Prescriptions     Signed Prescriptions Disp Refills    albuterol sulfate HFA (PROVENTIL HFA) 108 (90 Base) MCG/ACT inhaler 1 each 3     Sig: Inhale 2 puffs into the lungs every 6 hours as needed for Wheezing    naproxen (NAPROSYN) 500 MG tablet 60 tablet 0     Sig: Take 1 tablet by mouth 2 times daily (with meals)       Medications Discontinued During This Encounter   Medication Reason    naproxen (NAPROSYN) 500 MG tablet     albuterol sulfate HFA (PROVENTIL HFA) 108 (90 Base) MCG/ACT inhaler REORDER       Moe received counseling on the following healthy behaviors: nutrition, exercise and medication adherence    Discussed use,benefit, and side effects of prescribed medications. Barriers to medication compliance addressed. All patient questions answered. Pt voiced understanding. Return in about 2 weeks (around 9/26/2022) for Back pain . Disclaimer: Some orall of this note was transcribed using voice-recognition software. This may cause typographical errors occasionally. Although all effort is made to fix these errors, please do not hesitate to contact our office if there Edwar Flair concern with the understanding of this note.

## 2022-09-12 NOTE — PROGRESS NOTES
Attending Physician Statement  I have discussed the care of Houston Batista 40 y.o. female, including pertinent history and exam findings, with the resident Dr. Marcia Douglas MD.    History and Exam:   Chief Complaint   Patient presents with    Hip Pain     Patient is having right hip pain 8/10       Past Medical History:   Diagnosis Date    Asthma     Blood transfusion reaction     2008 after csection    Chronic migraine without aura with status migrainosus, not intractable 54/7/5112    Complication of anesthesia 2008    back pain for 1 year after the spinal     History of blood transfusion 7/5/2018    Microcytic anemia 3/3/2014    Sickle cell trait ([facility] [address] 2013     No Known Allergies   I have seen and examined the patient and the key elements of the encounter have been performed by me. BP Readings from Last 3 Encounters:   09/12/22 112/77   07/07/22 113/88   05/31/22 120/83     /77 (Site: Left Upper Arm, Position: Sitting, Cuff Size: Large Adult)   Pulse 96   Temp 97.8 °F (36.6 °C) (Temporal)   Wt 237 lb (107.5 kg)   LMP  (LMP Unknown)   BMI 46.29 kg/m²   Lab Results   Component Value Date    WBC 12.6 (H) 02/02/2022    HGB 13.5 02/02/2022    HCT 42.8 02/02/2022     02/02/2022    ALT 7 12/11/2018    AST 11 12/11/2018     02/02/2022    K 3.8 02/02/2022     02/02/2022    CREATININE 0.90 02/02/2022    BUN 8 02/02/2022    CO2 17 (L) 02/02/2022    TSH 1.25 02/15/2021    INR 1.0 02/02/2022    GLUF 76 10/31/2018     Lab Results   Component Value Date    LABALBU 3.5 12/11/2018     Lab Results   Component Value Date    IRON 8 (L) 03/03/2014    TIBC 442 03/03/2014    FERRITIN 14 03/03/2014     No results found for: LDLCALC, LDLCHOLESTEROL, LDLDIRECT  I agree with the assessment, plan and the diagnosis of    Diagnosis Orders   1. Right hip pain  XR HIP RIGHT (2-3 VIEWS)    Vitamin D 25 Hydroxy    [facility]    Hemoglobin A1C      2.  Mild intermittent asthma without

## 2022-09-12 NOTE — PATIENT INSTRUCTIONS
Thank you for letting us take care of you today. We hope all your questions were addressed. If a question was overlooked or something else comes to mind after you return home, please contact a member of your Care Team listed below. Your Care Team at [place] [address] is Team #4  Rosalina Luu MD (Faculty)  Fanta Irwin MD (Resident)  Mirtha Mar MD (Resident)  Marjorie Connor MD (Resident)  Miriam Fisher MD (Resident)  GINA Nguyen,CHARLES Chaney., [facility] office)  Daylene Hodgkins, [address] (Clinical Practice Manager)  Tj Mei [facility] (Clinical Pharmacist)       Office phone number: [phone]    If you need to get in right away due to illness, please be advised we have \"Same Day\" appointments available Monday-Friday. Please call us at [phone] option #3 to schedule your \"Same Day\" appointment.

## 2022-09-16 ENCOUNTER — HOSPITAL ENCOUNTER (OUTPATIENT)
Age: 37
Discharge: HOME | End: 2022-09-16
Payer: COMMERCIAL

## 2022-09-16 ENCOUNTER — TELEPHONE (OUTPATIENT)
Dept: FAMILY MEDICINE CLINIC | Age: 37
End: 2022-09-16

## 2022-09-16 ENCOUNTER — HOSPITAL ENCOUNTER (OUTPATIENT)
Age: 37
Discharge: HOME | End: 2022-09-18
Payer: COMMERCIAL

## 2022-09-16 ENCOUNTER — HOSPITAL ENCOUNTER (OUTPATIENT)
Dept: GENERAL RADIOLOGY | Age: 37
Discharge: HOME | End: 2022-09-18
Payer: COMMERCIAL

## 2022-09-16 DIAGNOSIS — E55.9 VITAMIN D DEFICIENCY: Primary | ICD-10-CM

## 2022-09-16 DIAGNOSIS — M25.551 RIGHT HIP PAIN: ICD-10-CM

## 2022-09-16 LAB
ESTIMATED AVERAGE GLUCOSE: 111 MG/DL
HBA1C MFR BLD: 5.5 % (ref 4–6)
VITAMIN D 25-HYDROXY: 12 NG/ML

## 2022-09-16 PROCEDURE — 83036 HEMOGLOBIN GLYCOSYLATED A1C: CPT

## 2022-09-16 PROCEDURE — 73502 X-RAY EXAM HIP UNI 2-3 VIEWS: CPT

## 2022-09-16 PROCEDURE — 36415 COLL VENOUS BLD VENIPUNCTURE: CPT

## 2022-09-16 PROCEDURE — 82306 VITAMIN D 25 HYDROXY: CPT

## 2022-09-16 RX ORDER — ERGOCALCIFEROL 1.25 MG/1
50000 CAPSULE ORAL WEEKLY
Qty: 6 CAPSULE | Refills: 0 | Status: SHIPPED | OUTPATIENT
Start: 2022-09-16 | End: 2022-10-19

## 2022-09-16 NOTE — TELEPHONE ENCOUNTER
Patient and discussed the lab results. We will send a prescription of vitamin D 50,000 to be taking weekly for 6 weeks.

## 2022-09-16 NOTE — TELEPHONE ENCOUNTER
Patient called wanting you to order her a xray of right knee. Patient states she is at .'s xray now.

## 2022-09-20 ENCOUNTER — HOSPITAL ENCOUNTER (OUTPATIENT)
Dept: PHYSICAL THERAPY | Age: 37
Setting detail: THERAPIES SERIES
Discharge: HOME | End: 2022-09-20
Payer: COMMERCIAL

## 2022-09-20 PROCEDURE — 97110 THERAPEUTIC EXERCISES: CPT

## 2022-09-20 PROCEDURE — 97163 PT EVAL HIGH COMPLEX 45 MIN: CPT

## 2022-09-20 NOTE — CONSULTS
[x] [facility]) [facility]  [facility]  [address]  [phone]  F: [phone]         Physical Therapy Lower Extremity Evaluation    Date:  2022  Patient: Houston Batista  : 1985  MRN: [id number]  Physician: Kassidy Roy MD       Insurance: [employer] (limit 60 Rx), [employer] (Auth after 30 Rx)  Medical Diagnosis: Right hip pain M25.551     Rehab Codes: M25.551, M79.651, M54.59, M25.561, M25.651, M25.661, M62.551,  M62.591 R26.89, R29.3  Onset date: 2022  Next 's appt.: 2022    Subjective:   HPI/CC: Pt reports sudden onset R hip pain, woke up with approx 3 weeks ago. Pain is sharp, really deep, at first so bad couldn't get up and move around. Pain varies, at times so bad she can't walk. Pain spreads into LB and into leg, w/extreme pain in knee at times, ant thigh is tender to pressure. Back pain starts middle LB, pinching around tailbone and arch, and spreads to both sides, worse on R. Today is a decent day, at 5/10, normal/average day pain is at 8/10. Pain varies throughout day, when worse can last for a few hours. Pain increases w/laying on back or L side, doing anything prolonged period of time (ie laying down), and steps up to 10/10. Pain decreases w/pain medicine (naproxen), and sitting down and resting. Since she began taking pain medicine, pain comes and goes. She gets stiff w/laying down too long, and has difficulty getting up from something low. Steps aggravate hip, leans more on L, does 1 step at a time. Dr ordered Vit D. No change w/hot baths, doesn't feel like gets deep enough. The other day had \"fire\" in LB going up her back. Pt reports has always had issues w/her back, was told had slight scoliosis growing up, this pain is different. Pt has h/o knee cap popping out to side a few years ago, did not have Rx for.       PMHx: [] Unremarkable [] Diabetes [] HTN  [] Pacemaker   [] MI/Heart Problems [] Cancer [] Arthritis [] Other:  Past Medical History:   Diagnosis Date    Asthma     Blood transfusion reaction      after csection    Chronic migraine without aura with status migrainosus, not intractable     Complication of anesthesia     back pain for 1 year after the spinal     History of blood transfusion 2018    Microcytic anemia 3/3/2014    Sickle cell trait ([facility] [address]      Past Surgical History:   Procedure Laterality Date     SECTION                       [x] Refer to full medical chart  In EPIC       Comorbidities:   [] Obesity [] Dialysis  [] N/A   [x] Asthma/COPD [] Dementia [] Other:   [] Stroke [] Sleep apnea [] Other:   [] Vascular disease [] Rheumatic disease [] Other:     Tests: [x] X-Ray: [] MRI:  [] Other:    Medications: [x] Refer to full medical record [] None [] Other:  Allergies:      [x] Refer to full medical record [] None [] Other:    Function:  Hand Dominance  [x] Right  [] Left  Patient lives with: , 5 kids, youngest 15 years old   In what type of home [x]  One story   [] Two story   [] Split level   Washing machine is on []  Main level   [] Second level   [x] Basement   Employer NA   Job Status []  Normal duty   [] Light duty   [] Off due to condition    []  Retired   [x] Not employed   [] Disability  [] Other:  []  Return to work:    Work activities/duties Has 5 kids       ADL/IADL Previous level of function Current level of function Who currently assists the patient with task   Bathing  [x] Independent  [] Assist [x] Independent  [] Assist    Dress/grooming [x] Independent  [] Assist [x] Independent  [] Assist    Transfer/mobility [x] Independent  [] Assist [x] Independent  [] Assist    Feeding [x] Independent  [] Assist [x] Independent  [] Assist    Toileting [x] Independent  [] Assist [x] Independent  [] Assist    Housekeeping [x] Independent  [] Assist [] Independent  [x] Assist Kids help, Pt has to sit and rest often   Grocery shop/meal prep [x] Independent  [] Assist [] Independent  [x] Assist Kids help, Pt has to sit and rest often     Gait Prior level of function Current level of function    [x] Independent  [] Assist [x] Independent  [] Assist   Device: [x] Independent [x] Independent    [] Straight Cane [] Quad cane [] Straight Cane [] Quad cane    [] Standard walker [] Rolling walker   [] 4 wheeled walker [] Standard walker [] Rolling walker   [] 4 wheeled walker    [] Wheelchair [] Wheelchair     Pain:  [x] Yes  [] No Location: R Hip, R LB, R thigh Pain Rating: (0-10 scale) 5/10  Pain altered Tx:  [] Yes  [] No  Action:    Symptoms:  [] Improving [] Worsening [x] Same    Sleep: [] OK    [x] Disturbed    Objective:    ROM  ° A/P STRENGTH TESTS (+/-) Left Right Not Tested    Left Right Left Right Ant. Drawer   []   Hip Flex 120° 89° 3+p 3-p Post. Drawer   []   Ext   3+p 3+p Lachmans   []   ER     Valgus Stress   []   IR     Varus Stress   []   ABD     Andreas   []   ADD     Apleys Comp.   []   Knee Flex 136° 121°p 4+ 4-p Apleys Dist.   []   Ext 0° 1° 4- 3+p Hip Scouring  - []   Ankle DF   4+ 4-p* LEONIDESs + R thigh, buttock + stinging []   PF     Piriformis Pinch  + []   INV     Kathryns   []   EVER     Talor Tilt   []   Lumbar flex 75p    Pat-Fem Grind   []   Ext 25°p          p=pain  Lumbar ext more painful than flex   *DF R causes pulling up to hip  DKTC causes pain ant R thigh  Pressure ant R thigh is painful    OBSERVATION No Deficit Deficit Not Tested Comments   Posture       Forward Head [] [x] []    Rounded Shoulders [] [x] []    Kyphosis [] [x] []    Lordosis [] [x] [] Increased    Lateral Shift [x] [] []    Scoliosis [x] [] [] Difficulty assessing due to body habitus;    Iliac Crest [x] [] []    PSIS [] [x] []  R higher-Difficulty palpating due to body habitus;   ASIS [] [x] [] L higher-Difficulty palpating due to body habitus;   Leg Length Discrp [] [x] [] R medial malleoli higher in supine    Slumped Sitting [x] [] []    Palpation [] [x] [] Tender to Palpation: lat R hip, R LB, R buttock, R post thigh;   Palpating L LB, lat hip feels R LB, buttock   Sensation [] [] [x]    Edema [] [] [x]    Neurological [] [] [x]    Patellar Mobility [] [x] [] R Hypermobile    Patellar Orientation [] [x] [] Sits lateral, increased lateral glide w/quad contraction   Gait [] [x] [] Analysis:slow, guarded      Hooklying painful outer buttocks 6-7/10. Prone: cramp in upper buttock  TOM: pinch 3/10 R buttock, LB  Press Ups: 1x pinch center LB; x10 not as sharp as at beginning, nagging 2/10      Functional Test: LEFS Score: 66% functionally impaired     Comments:    Assessment:  Patient would benefit from skilled physical therapy services in order to: decrease pain R hip, R thigh, R LB, increase ROM R hip, R knee, lumbar, increase strength R LE, L hip, spinal stab muscles, and improve tolerance to laying, activity and steps. Problems:    [x] ? Pain: R hip, R thigh, R LB, average 8/10, up to 10/10  [x] ? ROM: R hip, R knee, lumbar   [x] ? Strength: R LE, L hip, L knee ext, spinal stab muscles   [x] ? Function: LEFS 66% loss of LE function   [] Other:       STG: (to be met in 10 treatments)  ? Pain:R hip, thigh, LB, average 5/10, 7/10 at worst  ? ROM: R hip flex 100°, R knee, Lumbar AROM WNL without increase pain   ? Strength: R hip 3+/5, R knee 4/5, DF 4/5, L hip 4-/5, L knee ext 4/5, spinal stab muscles as evidenced by ability to perform beginning  spinal stab ex program  ? Function: LEFS 54% loss of LE function   Pt report improved tolerance to laying and getting up from something low  Patient to be independent with home exercise program as demonstrated by performance with correct form without cues. LTG: (to be met in 20 treatments)  ? Pain:R hip, thigh, LB, average 3/10, 5/10 at worst   ? ROM: R hip flex 108°  ?  Strength: R hip 4-/5, R knee 4+/5, DF 4+/5, L hip 4/5, L knee ext 4+/5, spinal stab muscles as evidenced by ability to perform moderate spinal stab 2 x/week for 20 visits        Todays Treatment:  Modalities:   Precautions:  Exercises:  Exercise Reps/ Time Weight/ Level Comments   Supine       MET  5x 5sec To correct pelvic rotation    Add sets  10x 5sec    IR/ER 20x  Legs extended          Seated       Add sets  10x 5sec    Other: Educated Pt in add sets and prone lie for HEP, issued handouts for add sets. Specific Instructions for next treatment: VMO strengthening, check pelvis, correct pelvic rotation, prone spinal stab ex, ok for modalities to decrease pain, aid healing      Evaluation Complexity:  History (Personal factors, comorbidities) [] 0 [] 1-2 [x] 3+   Exam (limitations, restrictions) [] 1-2 [] 3 [x] 4+   Clinical presentation (progression) [] Stable [x] Evolving  [] Unstable   Decision Making [] Low [] Moderate [x] High    [] Low Complexity [] Moderate Complexity [x] High Complexity       Treatment Charges: Mins Units   [x] Evaluation       []  Low       []  Moderate       [x]  High 48 1   []  Modalities     [x]  Ther Exercise 15 1   []  Manual Therapy     []  Ther Activities     []  Aquatics     []  Vasocompression     []  Other       TOTAL TREATMENT TIME: 63 min    Time in:1106   Time [facility]:0398    Electronically signed by: Jaziel Reyes PT          Physician Signature:________________________________Date:__________________  By signing above or cosigning this note, I have reviewed this plan of care and certify a need for medically necessary rehabilitation services.      *PLEASE SIGN ABOVE AND FAX BACK ALL PAGES*

## 2022-09-26 ENCOUNTER — OFFICE VISIT (OUTPATIENT)
Dept: FAMILY MEDICINE CLINIC | Age: 37
End: 2022-09-26
Payer: COMMERCIAL

## 2022-09-26 VITALS
HEART RATE: 85 BPM | WEIGHT: 243 LBS | BODY MASS INDEX: 47.71 KG/M2 | SYSTOLIC BLOOD PRESSURE: 141 MMHG | DIASTOLIC BLOOD PRESSURE: 101 MMHG | HEIGHT: 60 IN

## 2022-09-26 DIAGNOSIS — E55.9 VITAMIN D DEFICIENCY: Primary | ICD-10-CM

## 2022-09-26 DIAGNOSIS — M25.551 RIGHT HIP PAIN: ICD-10-CM

## 2022-09-26 PROCEDURE — G8417 CALC BMI ABV UP PARAM F/U: HCPCS

## 2022-09-26 PROCEDURE — G8427 DOCREV CUR MEDS BY ELIG CLIN: HCPCS

## 2022-09-26 PROCEDURE — 99213 OFFICE O/P EST LOW 20 MIN: CPT

## 2022-09-26 PROCEDURE — 1036F TOBACCO NON-USER: CPT

## 2022-09-26 SDOH — ECONOMIC STABILITY: FOOD INSECURITY: WITHIN THE PAST 12 MONTHS, THE FOOD YOU BOUGHT JUST DIDN'T LAST AND YOU DIDN'T HAVE MONEY TO GET MORE.: SOMETIMES TRUE

## 2022-09-26 SDOH — ECONOMIC STABILITY: FOOD INSECURITY: WITHIN THE PAST 12 MONTHS, YOU WORRIED THAT YOUR FOOD WOULD RUN OUT BEFORE YOU GOT MONEY TO BUY MORE.: SOMETIMES TRUE

## 2022-09-26 ASSESSMENT — ENCOUNTER SYMPTOMS
BACK PAIN: 1
SHORTNESS OF BREATH: 0
COUGH: 0
WHEEZING: 0
ABDOMINAL PAIN: 0

## 2022-09-26 ASSESSMENT — SOCIAL DETERMINANTS OF HEALTH (SDOH): HOW HARD IS IT FOR YOU TO PAY FOR THE VERY BASICS LIKE FOOD, HOUSING, MEDICAL CARE, AND HEATING?: SOMEWHAT HARD

## 2022-09-26 ASSESSMENT — PATIENT HEALTH QUESTIONNAIRE - PHQ9
7. TROUBLE CONCENTRATING ON THINGS, SUCH AS READING THE NEWSPAPER OR WATCHING TELEVISION: 0
SUM OF ALL RESPONSES TO PHQ QUESTIONS 1-9: 1
SUM OF ALL RESPONSES TO PHQ QUESTIONS 1-9: 1
6. FEELING BAD ABOUT YOURSELF - OR THAT YOU ARE A FAILURE OR HAVE LET YOURSELF OR YOUR FAMILY DOWN: 0
2. FEELING DOWN, DEPRESSED OR HOPELESS: 0
9. THOUGHTS THAT YOU WOULD BE BETTER OFF DEAD, OR OF HURTING YOURSELF: 0
5. POOR APPETITE OR OVEREATING: 1
SUM OF ALL RESPONSES TO PHQ9 QUESTIONS 1 & 2: 0
8. MOVING OR SPEAKING SO SLOWLY THAT OTHER PEOPLE COULD HAVE NOTICED. OR THE OPPOSITE, BEING SO FIGETY OR RESTLESS THAT YOU HAVE BEEN MOVING AROUND A LOT MORE THAN USUAL: 0
SUM OF ALL RESPONSES TO PHQ QUESTIONS 1-9: 1
SUM OF ALL RESPONSES TO PHQ QUESTIONS 1-9: 1
4. FEELING TIRED OR HAVING LITTLE ENERGY: 0
10. IF YOU CHECKED OFF ANY PROBLEMS, HOW DIFFICULT HAVE THESE PROBLEMS MADE IT FOR YOU TO DO YOUR WORK, TAKE CARE OF THINGS AT HOME, OR GET ALONG WITH OTHER PEOPLE: 0
3. TROUBLE FALLING OR STAYING ASLEEP: 0
1. LITTLE INTEREST OR PLEASURE IN DOING THINGS: 0

## 2022-09-26 NOTE — PROGRESS NOTES
Attending Physician Statement  I have discussed the care of Chana Jj, 40 y.o. female,including pertinent history and exam findings,  with the resident Dr. Cristela Ireland MD.  History:  Chief Complaint   Patient presents with    Hip Pain     Follow up, pain has gotten better    Back Pain     Follow up pain still going on     Patient is here for follow up on vitamin D deficiency and hip pain. I have reviewed the key elements of the encounter with the resident. Examination was done by resident as documented in residents note. BP Readings from Last 3 Encounters:   09/26/22 (!) 141/101   09/12/22 112/77   07/07/22 113/88     BP (!) 141/101 (Site: Left Lower Arm, Position: Sitting)   Pulse 85   Ht 5' (1.524 m)   Wt 243 lb (110.2 kg)   LMP  (LMP Unknown)   BMI 47.46 kg/m²   Lab Results   Component Value Date    WBC 12.6 (H) 02/02/2022    HGB 13.5 02/02/2022    HCT 42.8 02/02/2022     02/02/2022    ALT 7 12/11/2018    AST 11 12/11/2018     02/02/2022    K 3.8 02/02/2022     02/02/2022    CREATININE 0.90 02/02/2022    BUN 8 02/02/2022    CO2 17 (L) 02/02/2022    TSH 1.25 02/15/2021    INR 1.0 02/02/2022    GLUF 76 10/31/2018    LABA1C 5.5 09/16/2022     Lab Results   Component Value Date    CALCIUM 9.2 02/02/2022     No results found for: LDLCALC, LDLCHOLESTEROL, LDLDIRECT  I agree with the assessment, plan and diagnosis of    Diagnosis Orders   1. Vitamin D deficiency          I agree with  orders as documented by the resident. Recommendations:   Patient was counseled, advised to resume vitamin D supplement and NSAID and physical therapy. Return in about 6 weeks (around 11/7/2022).    ([facility] ) Dr. Anny Green MD

## 2022-09-26 NOTE — PROGRESS NOTES
Subjective:    Ney Christine is a 40 y.o. female with  has a past medical history of Asthma, Blood transfusion reaction, Chronic migraine without aura with status migrainosus, not intractable, Complication of anesthesia, History of blood transfusion, Microcytic anemia, and Sickle cell trait ([facility] [address] Presented to the office today for:  Chief Complaint   Patient presents with    Hip Pain     Follow up, pain has gotten better    Back Pain     Follow up pain still going on       Hip Pain   Pertinent negatives include no numbness. Back Pain  Pertinent negatives include no abdominal pain, chest pain, fever, headaches, numbness or weakness. This is 40years old female presents to the office today for follow-up on right hip pain and for review of recent lab results. Patient reported improvement of the right hip pain with NSAIDs as needed. Patient reported history of mild scoliosis diagnosed when she was a kid and closed some lower back discomfort. No numbness tingling or weakness. Review of Systems   Constitutional:  Negative for activity change, appetite change, fatigue, fever and unexpected weight change. Respiratory:  Negative for cough, shortness of breath and wheezing. Cardiovascular:  Negative for chest pain, palpitations and leg swelling. Gastrointestinal:  Negative for abdominal pain. Musculoskeletal:  Positive for back pain. Right hip pain    Neurological:  Negative for tremors, weakness, light-headedness, numbness and headaches.                The patient has a   Family History   Problem Relation Age of Onset    Diabetes Father     Hypertension Father     Diabetes Mother     Hypertension Mother     Depression Mother     Mental Illness Mother     Other Sister         CP  complications     Heart Attack Brother     Cancer Maternal Aunt     Uterine Cancer Maternal Aunt     Breast Cancer Neg Hx     Colon Cancer Neg Hx     Eclampsia Neg Hx     Ovarian Cancer Neg Hx      Labor Neg Hx     Spont Abortions Neg Hx     Stroke Neg Hx        Objective:    BP (!) 141/101 (Site: Left Lower Arm, Position: Sitting)   Pulse 85   Ht 5' (1.524 m)   Wt 243 lb (110.2 kg)   LMP  (LMP Unknown)   BMI 47.46 kg/m²    BP Readings from Last 3 Encounters:   09/26/22 (!) 141/101   09/12/22 112/77   07/07/22 113/88       Physical Exam  Cardiovascular:      Rate and Rhythm: Normal rate and regular rhythm. Pulses: Normal pulses. Heart sounds: Normal heart sounds. Pulmonary:      Effort: Pulmonary effort is normal.      Breath sounds: Normal breath sounds. Musculoskeletal:      Comments: Bilateral LE exam:  Normal range of motion of the hips and knee joints bilaterally  Sensation intact  Strength 5/5    Back exam:  Thoracic scoliosis noted on bending test         Lab Results   Component Value Date    WBC 12.6 (H) 02/02/2022    HGB 13.5 02/02/2022    HCT 42.8 02/02/2022     02/02/2022    ALT 7 12/11/2018    AST 11 12/11/2018     02/02/2022    K 3.8 02/02/2022     02/02/2022    CREATININE 0.90 02/02/2022    BUN 8 02/02/2022    CO2 17 (L) 02/02/2022    TSH 1.25 02/15/2021    INR 1.0 02/02/2022    GLUF 76 10/31/2018    LABA1C 5.5 09/16/2022     Lab Results   Component Value Date    CALCIUM 9.2 02/02/2022     No results found for: LDLCALC, LDLCHOLESTEROL, LDLDIRECT    Assessment and Plan:    1. Vitamin D deficiency  -Prescription of vitamin D 50,000 units to be taken weekly for 6 weeks was already sent in previous encounter    2. Right hip pain  -Continue physical therapy  -Continue naproxen for as needed pain  -Counseled patient about the side effects of NSAIDs and to minimize its usage to only severe pain. Requested Prescriptions      No prescriptions requested or ordered in this encounter       There are no discontinued medications.     nAuja Adler received counseling on the following healthy behaviors: nutrition, exercise and medication adherence    Discussed use,benefit, and side effects of prescribed medications. Barriers to medication compliance addressed. All patient questions answered. Pt voiced understanding. Return in about 6 weeks (around 11/7/2022). Disclaimer: Some orall of this note was transcribed using voice-recognition software. This may cause typographical errors occasionally. Although all effort is made to fix these errors, please do not hesitate to contact our office if there Leon Libman concern with the understanding of this note.

## 2022-09-26 NOTE — PROGRESS NOTES
Visit Information    Have you changed or started any medications since your last visit including any over-the-counter medicines, vitamins, or herbal medicines? no   Are you having any side effects from any of your medications? -  no  Have you stopped taking any of your medications? Is so, why? -  no    Have you seen any other physician or provider since your last visit? Yes OBGYN Have you had any other diagnostic tests since your last visit? No  Have you been seen in the emergency room and/or had an admission to a hospital since we last saw you? No  Have you had your routine dental cleaning in the past 6 months? no    Have you activated your [employer] account? If not, what are your barriers?  Yes     Patient Care Team:  Alida Mobley MD as PCP - General (Family Medicine)  Pepe Arechiga MD as Obstetrician (Perinatology)  Stephany Be DO as Resident (Obstetrics & Gynecology)    Medical History Review  Past Medical, Family, and Social History reviewed and does not contribute to the patient presenting condition    Health Maintenance   Topic Date Due    COVID-19 Vaccine (1) Never done    Pneumococcal 0-64 years Vaccine (1 - PCV) Never done    Hepatitis B vaccine (2 of 4 - 4-dose series) 01/09/2002    Varicella vaccine (2 of 2 - 13+ 2-dose series) 01/09/2002    Flu vaccine (1) Never done    Cervical cancer screen  07/23/2023    Depression Monitoring  09/12/2023    DTaP/Tdap/Td vaccine (3 - Td or Tdap) 10/29/2028    Hepatitis C screen  Completed    HIV screen  Completed    Hepatitis A vaccine  Aged Out    Hib vaccine  Aged Out    Meningococcal (ACWY) vaccine  Aged Out

## 2022-09-28 ENCOUNTER — HOSPITAL ENCOUNTER (OUTPATIENT)
Dept: PHYSICAL THERAPY | Age: 37
Setting detail: THERAPIES SERIES
Discharge: HOME | End: 2022-09-28
Payer: COMMERCIAL

## 2022-09-28 PROCEDURE — 97110 THERAPEUTIC EXERCISES: CPT

## 2022-09-28 NOTE — FLOWSHEET NOTE
[x] [facility]) [facility] &  Therapy  [address]  [phone]  F: [phone] [] [address]  [facility]   [address]  P: [phone]  F: [phone] [] [address]  [address]  P: [phone]  F: [phone] [] [address]  P: [phone]  F: [phone] [] [address]  [facility]   [address]   [place]: [phone]  F: [phone]      Physical Therapy Daily Treatment Note    Date:  2022  Patient Name:  Black Pate    :  1985  MRN: [id number]  Physician: Apollo العلي MD                                       Insurance: [employer] (limit 60 Rx), [place] [employer] (Auth after 30 Rx)  Medical Diagnosis: Right hip pain M25.551                          Rehab Codes: M25.551, M79.651, M54.59, M25.561, M25.651, M25.661, M62.551,  M62.591 R26.89, R29.3  Onset date: 2022                     Next 's appt.: 2022  Visit# / total visits: 2/20    Cancels/No Shows: 0/0    Subjective:    Pain:  [x] Yes  [] No  Location:  R thigh           Pain Rating: (0-10 scale) 1/10  Pain altered Tx:  [] Yes  [x] No  Action:  Comments: Patient reports being mostly compliant with HEP.     Objective:  Modalities:   Precautions:  Exercises:  Exercise Reps/ Time Weight/ Level Comments   SciFit  5 min L2 Added          Supine          MET  5x 5sec To correct pelvic rotation - held    Add sets  10x 5sec     IR/ER 20x   Legs extended    trA iso 5x  Added    trA iso w/ marches 5x   Added    Bridges 10x  Added    PPTs 10x  Added    SLRs 10x  Added          Seated          Add sets  10x 5sec     Jonathan HS stretch w/ stool 3x30\"  Added   Really feels stretch on R hip and into back   SB rollout 10x5\"  Flexion  Added 9/28   Jonathan LAQs 10x  Added 9/28         Prone       Glute sets 10x  Added 9/28   Jonathan SLRs 10x  Added 9/28   Jonathan HS curls 10x  Added 9/28   Prone on elbows 3 min  Added 9/28   Other:      Specific Instructions for next treatment: VMO strengthening, check pelvis, correct pelvic rotation, prone spinal stab ex, ok for modalities to decrease pain, aid healing       Treatment Charges: Mins Units   []  Modalities     [x]  Ther Exercise 50 3   []  Manual Therapy     []  Ther Activities     []  Aquatics     []  Vasocompression     []  Other     Total Treatment time 50 3       Assessment: [x] Progressing toward goals. Added supine hip exercises tolerated fair with onset of achy pain and fatigue especially during SLRs and marches. Added seated hip and lower back exercises tolerated well with no onset of pain or sx. Added prone hip exercises tolerated well with no onset of pain or sx. Patient with no verbalization of increased pain, however, demonstrates fatigue following exercises. [] No change. [] Other:  [x] Patient would continue to benefit from skilled physical therapy services in order to:  decrease pain R hip, R thigh, R LB, increase ROM R hip, R knee, lumbar, increase strength R LE, L hip, spinal stab muscles, and improve tolerance to laying, activity and steps. STG: (to be met in 10 treatments)  ? Pain:R hip, thigh, LB, average 5/10, 7/10 at worst  ? ROM: R hip flex 100°, R knee, Lumbar AROM WNL without increase pain   ? Strength: R hip 3+/5, R knee 4/5, DF 4/5, L hip 4-/5, L knee ext 4/5, spinal stab muscles as evidenced by ability to perform beginning  spinal stab ex program  ? Function: LEFS 54% loss of LE function   Pt report improved tolerance to laying and getting up from something low  Patient to be independent with home exercise program as demonstrated by performance with correct form without cues. LTG: (to be met in 20 treatments)  ?  Pain:R hip, thigh, LB, average 3/10, 5/10 at worst   ? ROM: R hip flex 108°  ? Strength: R hip 4-/5, R knee 4+/5, DF 4+/5, L hip 4/5, L knee ext 4+/5, spinal stab muscles as evidenced by ability to perform moderate spinal stab ex program  ? Function: LEFS 40% loss of LE function   Pt report improved tolerance to activity and steps                    Patient goals: decrease pain, able to do things longer time     Pt. Education:  [x] Yes  [] No  [x] Reviewed Prior HEP/Ed  Method of Education: [] Verbal  [] Demo  [] Written  Comprehension of Education:  [x] Verbalizes understanding. [] Demonstrates understanding. [x] Needs review. [] Demonstrates/verbalizes HEP/Ed previously given. Plan: [x] Continue current frequency toward long and short term goals. [x] Specific Instructions for subsequent treatments: lumbar ROM, hip stretching, stabilization of hips, standing gastroc stretch and heel raises    Frequency:  2 x/week for 20 visits      Time In: 1105 am            Time Out: 1200 pm    Electronically signed by:  Darrel Montejo    Patient treated and note written by Todd Mensah, Student Physical Therapist Assistant under supervision of Shelba Cockayne, PTA.

## 2022-09-30 ENCOUNTER — HOSPITAL ENCOUNTER (OUTPATIENT)
Dept: PHYSICAL THERAPY | Age: 37
Setting detail: THERAPIES SERIES
Discharge: HOME | End: 2022-09-30
Payer: COMMERCIAL

## 2022-10-03 ENCOUNTER — HOSPITAL ENCOUNTER (OUTPATIENT)
Dept: PHYSICAL THERAPY | Age: 37
Setting detail: THERAPIES SERIES
Discharge: HOME | End: 2022-10-03
Payer: COMMERCIAL

## 2022-10-03 PROCEDURE — 97110 THERAPEUTIC EXERCISES: CPT

## 2022-10-03 NOTE — FLOWSHEET NOTE
[x] [facility] &  Therapy  [address]  [phone]  F: [phone] [] [address]  [employer]   [address]  P: [phone]  F: [phone] [] [address]  [address]  P: [phone]  F: [phone] [] [address]  P: [phone]  F: [phone] [] [address]  [facility]   [address]   [place]: [phone]  F: [phone]      Physical Therapy Daily Treatment Note    Date:  10/3/2022  Patient Name:  Raisa Corona    :  1985  MRN: [id number]  Physician: Judy Kate MD                                       Insurance: BC/[employer] (limit 60 Rx), [employer] Advantage (Auth after 30 Rx)  Medical Diagnosis: Right hip pain M25.551                          Rehab Codes: M25.551, M79.651, M54.59, M25.561, M25.651, M25.661, M62.551,  M62.591 R26.89, R29.3  Onset date: 2022                     Next 's appt.: 2022  Visit# / total visits: 3/20    Cancels/No Shows: 0/0    Subjective:    Pain:  [] Yes  [x] No  Location:  R thigh           Pain Rating: (0-10 scale) 1-2/10 R anterior/lateral hip/groin  Pain altered Tx:  [] Yes  [x] No  Action:  Comments:   Reports left ankle feels weaker/less stable since 10/1/22. Addressing HEP.      Objective:  Modalities:  as needed   Precautions:  Exercises:10/3/2022 completed with X  Exercise Reps/ Time Weight/ Level Comments 10/3/2022    X   SciFit  5 min L2   x          Standing hip flexor stretch 2x20\"  Painful for R knee, held and switched to supine with strap-10/3/22 x          Supine           MET  x 5sec  Leg length equal 10/3  x   Add sets  10x 5sec      IR/ER 20x   Legs extended     trA iso 10x    x   trA iso w/ marches 15x    x   Tra iso w/ alt UE 15x   x Tra iso alt same UE/LE 10x  Added 10/3 x   Bridges 10x 3\"    x   PPTs 10x   x   SLRs 10x   x   Hip abd 10-15x lime Added 10/3 x   Hip flexor stretch 3x30\"  Off side of mat, lumbar roll, strap LE, added 10/3 x          Seated           Add sets  10x 5sec      Jonathan HS stretch w/ stool 3x30\"  Added 9/28  Really feels stretch on R hip and into back x   SB rollout 10x5\"  Flexion    x   Jonathan LAQs 10x    x   Piriformis stertch 3x20\"  Sitting EOB x   Ankle circles add      Ankle tband add             Prone    Restart next session. Glute sets 10x      Jonathan SLRs 10x      Jonathan HS curls 10x      Prone on elbows 3 min      Other: 10/3/22 R knee painful with addressing hip flexor stretch in standing today, swithed to supine off side of mat without complaints. Specific Instructions for next treatment: VMO strengthening, check pelvis, correct pelvic rotation, prone spinal stab ex, ok for modalities to decrease pain, aid healing. Add ankle strengthening. Treatment Charges: Mins Units   []  Modalities     [x]  Ther Exercise 45 3   []  Manual Therapy     []  Ther Activities     []  Aquatics     []  Vasocompression     []  Other     Total Treatment time 45 3       Assessment: [x] Progressing toward goals added piriformis and hip flexor stretching with HEP progressed as charted. Also slowly progressed gluteal and core strengthening, pt demonstrated good understanding post education. Leg length equal with assessment this date, held MET. [] No change. [] Other:  [x] Patient would continue to benefit from skilled physical therapy services in order to:  decrease pain R hip, R thigh, R LB, increase ROM R hip, R knee, lumbar, increase strength R LE, L hip, spinal stab muscles, and improve tolerance to laying, activity and steps. STG: (to be met in 10 treatments)  ? Pain:R hip, thigh, LB, average 5/10, 7/10 at worst  ? ROM: R hip flex 100°, R knee, Lumbar AROM WNL without increase pain   ?  Strength: R hip 3+/5, R knee 4/5, DF 4/5, L hip 4-/5, L knee ext 4/5, spinal stab muscles as evidenced by ability to perform beginning  spinal stab ex program  ? Function: LEFS 54% loss of LE function   Pt report improved tolerance to laying and getting up from something low  Patient to be independent with home exercise program as demonstrated by performance with correct form without cues. LTG: (to be met in 20 treatments)  ? Pain:R hip, thigh, LB, average 3/10, 5/10 at worst   ? ROM: R hip flex 108°  ? Strength: R hip 4-/5, R knee 4+/5, DF 4+/5, L hip 4/5, L knee ext 4+/5, spinal stab muscles as evidenced by ability to perform moderate spinal stab ex program  ? Function: LEFS 40% loss of LE function   Pt report improved tolerance to activity and steps                    Patient goals: decrease pain, able to do things longer time     Pt. Education:  [x] Yes  [] No  [x] Reviewed Prior HEP/Ed  Method of Education: [] Verbal  [] Demo  [] Written  Comprehension of Education:  [x] Verbalizes understanding. [] Demonstrates understanding. [x] Needs review. [] Demonstrates/verbalizes HEP/Ed previously given. HEP-Access Code: [email or web address]  URL: [email or web address]  Supine Hip Adduction Isometric with Ball - 5 x daily - 7 x weekly - 10 reps - 5 seconds hold  Seated Hip Adduction Isometrics with Ball - 5 x daily - 7 x weekly - 10 reps - 5 sec hold  Prone Gluteal Sets - 5-10 x daily - 7 x weekly - 10 reps - 5 seconds hold    10/3/22: [employer] handouts (error that access code not copied over)  Hamstring stretch 2x daily 7x/wk/ 3x 30 seconds  Piriformis stertch 2x daily 7x/wk/ 3x 30 seconds, sitting. Hip flexor stretch 2x daily 7x/wk/ 3x 30 seconds, supine w/strap    Plan: [x] Continue current frequency toward long and short term goals.     [x] Specific Instructions for subsequent treatments: lumbar ROM, hip stretching, stabilization of hips, standing gastroc stretch and heel raises    Frequency:  2 x/week for 20 visits      Time In:  3887 Time Out:  1123    Electronically signed by:  Alec Weaver PTA

## 2022-10-07 ENCOUNTER — HOSPITAL ENCOUNTER (OUTPATIENT)
Dept: PHYSICAL THERAPY | Age: 37
Setting detail: THERAPIES SERIES
Discharge: HOME | End: 2022-10-07
Payer: COMMERCIAL

## 2022-10-07 PROCEDURE — 97110 THERAPEUTIC EXERCISES: CPT

## 2022-10-07 NOTE — FLOWSHEET NOTE
[x] [facility] &  Therapy  [address]  [phone]  F: [phone] [] [address]  [employer] [address]   [address]  P: [phone]  F: [phone] [] [address]  [address]  P: [phone]  F: [phone] [] [address]  P: [phone]  F: [phone] [] [address]  [facility]   [address]   [place]: [phone]  F: [phone]      Physical Therapy Daily Treatment Note    Date:  10/7/2022  Patient Name:  Raisa Corona    :  1985  MRN: [id number]  Physician: Judy Kate MD                                       Insurance: BC/[employer] (limit 60 Rx), [place] Advantage (Auth after 30 Rx)  Medical Diagnosis: Right hip pain M25.551                          Rehab Codes: M25.551, M79.651, M54.59, M25.561, M25.651, M25.661, M62.551,  M62.591 R26.89, R29.3  Onset date: 2022                     Next 's appt.: 2022  Visit# / total visits: /    Cancels/No Shows: 0/0    Subjective:    Pain:  [x] Yes  [] No  Location:  R thigh           Pain Rating: (0-10 scale) 1-2/10 R anterior/lateral hip/groin  Pain altered Tx:  [] Yes  [x] No  Action:  Comments:   Reports hip pain can get up to 7-8/10, lateral hip. 0/10 walking into clinic today, but 1/10 with static standing during subjective report. States Left ankle and  (SI) area will crack and pop at times, newer complaints. Objective:  Modalities:  CP to LB during sitting ankle exercise.    Precautions:  Exercises:10/7/2022 completed with X  Exercise   R thigh Reps/ Time Weight/ Level Comments 10/7/2022    X   SciFit  5 min L2   x          Supine           MET  x 5sec   10/7 hip/LE equal.  x   Add sets  10x 5sec   x   IR/ER 20x   Legs extended SKTC 3x20  Added 10/7 x   Fabers stretch 3x20  Added 10/7 X   trA iso 10x    x   trA iso w/ marches 15x    x   Tra iso w/ alt UE 15x 1 lbs Added wt 10/7 x   Tra iso alt same UE/LE 10x    x   Dead bug Add       Bridges 2x10 3\"    x   PPTs 10x   x   SLRs 10x   x   SLR w/ ER 10x  Added 10/7 x   Hip abd 10-15x lime      Hip flexor stretch 3x30\"  Off side of mat x          Seated           Add sets  5x 5sec   x   Jonathan HS stretch w/ stool 3x30\"    x   SB rollout 10x5\"  Flexion       Jonathan LAQs 10x       Piriformis stertch 3x20\"  Sitting EOB x   Ankle circles  3x  Added 10/7 x   Ankle tband 3x blue Added 10/7 x          Prone         Glute sets 10x      Jonathan SLRs 10x      Jonathan HS curls 10x      Prone on elbows 3 min      Other:      Specific Instructions for next treatment: VMO strengthening, check pelvis, correct pelvic rotation, prone spinal stab ex, ok for modalities to decrease pain, aid healing. Treatment Charges: Mins Units   []  Modalities     [x]  Ther Exercise  54 4   []  Manual Therapy     []  Ther Activities     []  Aquatics     []  Vasocompression     []  Other     Total Treatment time 54 4       Assessment: [x] Progressing toward goals pelvis/LE equal.  Addressed VMO and core strengthening as charted then address various stretches due to pain complaints as charted. Ended with HP to LB during ankle resistive exercises. [] No change. [] Other:  [x] Patient would continue to benefit from skilled physical therapy services in order to:  decrease pain R hip, R thigh, R LB, increase ROM R hip, R knee, lumbar, increase strength R LE, L hip, spinal stab muscles, and improve tolerance to laying, activity and steps. STG: (to be met in 10 treatments)  ? Pain:R hip, thigh, LB, average 5/10, 7/10 at worst  ? ROM: R hip flex 100°, R knee, Lumbar AROM WNL without increase pain   ?  Strength: R hip 3+/5, R knee 4/5, DF 4/5, L hip 4-/5, L knee ext 4/5, spinal stab muscles as evidenced by ability to perform beginning  spinal stab ex program  ? Function: LEFS 54% loss of LE function   Pt report improved tolerance to laying and getting up from something low  Patient to be independent with home exercise program as demonstrated by performance with correct form without cues. LTG: (to be met in 20 treatments)  ? Pain:R hip, thigh, LB, average 3/10, 5/10 at worst   ? ROM: R hip flex 108°  ? Strength: R hip 4-/5, R knee 4+/5, DF 4+/5, L hip 4/5, L knee ext 4+/5, spinal stab muscles as evidenced by ability to perform moderate spinal stab ex program  ? Function: LEFS 40% loss of LE function   Pt report improved tolerance to activity and steps                    Patient goals: decrease pain, able to do things longer time     Pt. Education:  [x] Yes  [] No  [x] Reviewed Prior HEP/Ed  Method of Education: [x] Verbal  [x] Demo  [x] Written  Comprehension of Education:  [x] Verbalizes understanding. [x] Demonstrates understanding. [] Needs review. [x] Demonstrates/verbalizes HEP/Ed previously given. HEP-Access Code: [email or web address]  URL: [email or web address]  Supine Hip Adduction Isometric with Ball - 5 x daily - 7 x weekly - 10 reps - 5 seconds hold  Seated Hip Adduction Isometrics with Ball - 5 x daily - 7 x weekly - 10 reps - 5 sec hold  Prone Gluteal Sets - 5-10 x daily - 7 x weekly - 10 reps - 5 seconds hold    10/3/22: [employer] handouts (error that access code not copied over)  Hamstring stretch 2x daily 7x/wk/ 3x 30 seconds  Piriformis stertch 2x daily 7x/wk/ 3x 30 seconds, sitting. Hip flexor stretch 2x daily 7x/wk/ 3x 30 seconds, supine w/strap    Access Code: [email or web address]  URL: [email or web address]  Date: 10/07/2022  Prepared by: Sy Bolivar    Exercises  Seated Ankle Dorsiflexion AROM - 1 x daily - 7 x weekly - 2 sets - 10 reps  Seated Ankle Circles - 1 x daily - 7 x weekly - 2 sets - 10 reps  Ankle Dorsiflexion with Resistance - 1 x daily - 5 x weekly - 2 sets - 10 reps  Ankle Eversion with Resistance - 1 x daily - 5 x weekly - 2 sets - 10 reps  Ankle Inversion with Resistance - 1 x daily - 5 x weekly - 2 sets - 10 reps  Ankle and Toe Plantarflexion with Resistance - 1 x daily - 5 x weekly - 2 sets - 10 reps  10/7/22: blue tband. Plan: [x] Continue current frequency toward long and short term goals. [x] Specific Instructions for subsequent treatments: lumbar ROM, hip stretching, stabilization of hips, standing gastroc stretch and heel raises. Issue HEP for VMO and core next session.      Frequency:  2 x/week for 20 visits      Time In:  1050        Time Out:   6685     Electronically signed by:  Sonia Estevez, PTA

## 2022-10-10 ENCOUNTER — APPOINTMENT (OUTPATIENT)
Dept: PHYSICAL THERAPY | Age: 37
End: 2022-10-10
Payer: COMMERCIAL

## 2022-10-12 ENCOUNTER — HOSPITAL ENCOUNTER (OUTPATIENT)
Dept: PHYSICAL THERAPY | Age: 37
Setting detail: THERAPIES SERIES
Discharge: HOME | End: 2022-10-12
Payer: COMMERCIAL

## 2022-10-12 PROCEDURE — 97110 THERAPEUTIC EXERCISES: CPT

## 2022-10-12 NOTE — FLOWSHEET NOTE
[x] [facility]) [facility] &  Therapy  [address]  [phone]  F: [phone] [] [address]  [facility]   [address]  P: [phone]  F: [phone] [] [address]  [address]  P: [phone]  F: [phone] [] [address]  P: [phone]  F: [phone] [] [address]  [facility]   [address]   [place]: [phone]  F: [phone]      Physical Therapy Daily Treatment Note    Date:  10/12/2022  Patient Name:  Hesham Hanna    :  1985  MRN: [id number]  Physician: Earnesteen Rubinstein, MD                                       Insurance: [employer] (limit 60 Rx), [place] Advantage (Auth after 30 Rx)  Medical Diagnosis: Right hip pain M25.551                          Rehab Codes: M25.551, M79.651, M54.59, M25.561, M25.651, M25.661, M62.551,  M62.591 R26.89, R29.3  Onset date: 2022                     Next 's appt.: 2022  Visit# / total visits:     Cancels/No Shows: 0/0    Subjective:    Pain:  [] Yes  [x] No  Location:  R thigh           Pain Rating: (0-10 scale) 010 R anterior/lateral hip/groin  Pain altered Tx:  [] Yes  [x] No  Action:  Comments:    Reports hip is okay today but left ankle weakness can cause hip pain to return/increase. Addressing HEP. Objective:  Modalities:  CP to LB during sitting ankle exercise. -HELD 10/12  Precautions:  Exercises:10/12/2022 completed with X  Exercise   R thigh Reps/ Time Weight/ Level Comments 10/12/2022    X   SciFit  5 min L3  Incr.  Resistance 10/12 x          standing   Added 10/12    Gastro stretch 3x20\"  wedge x   Heel raises 15x A  x    3 way hip  10xea A   x          Supine           MET  x 5sec   10/7 hip/LE equal., 10/12 equal x   Add sets 10x 5sec       SKTC 3x20       Fabers stretch 3x20   x   trA iso 10x       trA iso w/ marches 15x    x   Tra iso w/ alt UE 15x 1 lbs   x   Tra iso alt same UE/LE 10x 1lb U/LE   x   Dead bug 10x 1lbs U/LE Added 10/12 x   Bridges 2x10 3\"    x   PPTs 5x   x   SLRs 10x 1 lbs Added wt 10/12 x   SLR w/ ER 10x   Restart next session    Hip abd 2x10 lime   x   Hip flexor stretch 3x30\"  Off side of mat x          Seated           Add sets  5x 5sec        HS stretch w/ stool 3x30\"   R x   SB rollout 10x5\"  Flexion       Jonathan LAQs 10x       Piriformis stertch 3x20\"  Sitting EOB, R x   Ankle circles 3x  Added 10/7     Ankle tband 3x blue Added 10/7    LAQ w/ ball  add  Goal*           Prone         Glute sets 10x      Jonathan SLRs 10x      Jonathan HS curls 10x      Prone on elbows 3 min      Other:      Specific Instructions for next treatment: VMO strengthening, check pelvis, correct pelvic rotation, prone spinal stab ex, ok for modalities to decrease pain, aid healing. Treatment Charges: Mins Units   []  Modalities     [x]  Ther Exercise 54  4   []  Manual Therapy     []  Ther Activities     []  Aquatics     []  Vasocompression     []  Other     Total Treatment time 54 4       Assessment: [x] Progressing toward goals  added standing 3 way hip and calf exercises for strengthening. Also progressed core strengthening exercises as charted with education on purpose. Fatigues with gluteals. Will issued core HEP per pt request.     [] No change. [] Other:  [x] Patient would continue to benefit from skilled physical therapy services in order to:  decrease pain R hip, R thigh, R LB, increase ROM R hip, R knee, lumbar, increase strength R LE, L hip, spinal stab muscles, and improve tolerance to laying, activity and steps. STG: (to be met in 10 treatments)  ? Pain:R hip, thigh, LB, average 5/10, 7/10 at worst  ? ROM: R hip flex 100°, R knee, Lumbar AROM WNL without increase pain   ?  Strength: R hip 3+/5, R knee 4/5, DF 4/5, L hip 4-/5, L knee ext 4/5, spinal stab muscles as evidenced by ability to perform beginning  spinal stab ex program  ? Function: LEFS 54% loss of LE function   Pt report improved tolerance to laying and getting up from something low  Patient to be independent with home exercise program as demonstrated by performance with correct form without cues. LTG: (to be met in 20 treatments)  ? Pain:R hip, thigh, LB, average 3/10, 5/10 at worst   ? ROM: R hip flex 108°  ? Strength: R hip 4-/5, R knee 4+/5, DF 4+/5, L hip 4/5, L knee ext 4+/5, spinal stab muscles as evidenced by ability to perform moderate spinal stab ex program  ? Function: LEFS 40% loss of LE function   Pt report improved tolerance to activity and steps                    Patient goals: decrease pain, able to do things longer time     Pt. Education:  [x] Yes  [] No  [x] Reviewed Prior HEP/Ed  Method of Education: [x] Verbal  [x] Demo  [x] Written  Comprehension of Education:  [x] Verbalizes understanding. [x] Demonstrates understanding. [] Needs review. [x] Demonstrates/verbalizes HEP/Ed previously given. HEP-Access Code: [email or web address]  URL: [email or web address]  Supine Hip Adduction Isometric with Ball - 5 x daily - 7 x weekly - 10 reps - 5 seconds hold  Seated Hip Adduction Isometrics with Ball - 5 x daily - 7 x weekly - 10 reps - 5 sec hold  Prone Gluteal Sets - 5-10 x daily - 7 x weekly - 10 reps - 5 seconds hold    10/3/22: [employer] handouts (error that access code not copied over)  Hamstring stretch 2x daily 7x/wk/ 3x 30 seconds  Piriformis stertch 2x daily 7x/wk/ 3x 30 seconds, sitting. Hip flexor stretch 2x daily 7x/wk/ 3x 30 seconds, supine w/strap    Access Code: [email or web address]  URL: [email or web address]  Date: 10/07/2022  Exercises  Seated Ankle Dorsiflexion AROM - 1 x daily - 7 x weekly - 2 sets - 10 reps  Seated Ankle Circles - 1 x daily - 7 x weekly - 2 sets - 10 reps  Ankle Dorsiflexion with Resistance - 1 x daily - 5 x weekly - 2 sets - 10 reps  Ankle Eversion with Resistance - 1 x daily - 5 x weekly - 2 sets - 10 reps  Ankle Inversion with Resistance - 1 x daily - 5 x weekly - 2 sets - 10 reps  Ankle and Toe Plantarflexion with Resistance - 1 x daily - 5 x weekly - 2 sets - 10 reps  10/7/22: blue tband. Access Code: [email or web address]  URL: [employer]/  Date: 10/12/2022  Exercises  Gastroc Stretch on Wall - 1 x daily - 4 x weekly - 3 sets - 10 reps  Standing Heel Raise with Support - 1 x daily - 4 x weekly - 3 sets - 10 reps  Standing Hip Abduction with Counter Support - 1 x daily - 4 x weekly - 3 sets - 10 reps  Standing Hip Extension with Counter Support - 1 x daily - 4 x weekly - 3 sets - 10 reps  Standing Hip Flexion AROM - 1 x daily - 4 x weekly - 3 sets - 10 reps      Plan: [x] Continue current frequency toward long and short term goals. [x] Specific Instructions for subsequent treatments: lumbar ROM, hip stretching, stabilization of hips, standing gastroc stretch and heel raises. Issue HEP core next session.      Frequency:  2 x/week for 20 visits      Time In:  1250       Time Out:   3008    Electronically signed by:  Liberty Zee PTA

## 2022-10-17 ENCOUNTER — HOSPITAL ENCOUNTER (OUTPATIENT)
Dept: PHYSICAL THERAPY | Age: 37
Setting detail: THERAPIES SERIES
Discharge: HOME | End: 2022-10-17
Payer: COMMERCIAL

## 2022-10-17 PROCEDURE — 97110 THERAPEUTIC EXERCISES: CPT

## 2022-10-17 NOTE — FLOWSHEET NOTE
[x] [facility]) [facility] &  Therapy  [address]  [phone]  F: [phone] [] [address]  [facility]   [address]  P: [phone]  F: [phone] [] [address]  [address]  P: [phone]  F: [phone] [] [address]  P: [phone]  F: [phone] [] [address]  [facility]   [address]   [place]: [phone]  F: [phone]      Physical Therapy Daily Treatment Note    Date:  10/17/2022  Patient Name:  Lianne Borrego    :  1985  MRN: [id number]  Physician: Carlos Alvarado MD                                       Insurance: [employer] (limit 60 Rx), [place] [employer] (Auth after 30 Rx)  Medical Diagnosis: Right hip pain M25.551                          Rehab Codes: M25.551, M79.651, M54.59, M25.561, M25.651, M25.661, M62.551,  M62.591 R26.89, R29.3  Onset date: 2022                     Next 's appt. :  tbd  Visit# / total visits:     Cancels/No Shows: 0/0    Subjective:    Pain:  [] Yes  [x] No  Location:  R thigh           Pain Rating: (0-10 scale) 0/10 R anterior/lateral hip/groin  Pain altered Tx:  [] Yes  [x] No  Action:  Comments:     No pain today, no complaints from prior sessio. Has not address tband 4 way ankle HEP. Objective:  Modalities:  CP to LB during sitting ankle exercise. -HELD 10/12  Precautions:  Exercises:10/17/2022 completed with X  Exercise   R thigh Reps/ Time Weight/ Level Comments 10/17/2022    X   SciFit  5 min L3    x          standing        Gastro stretch 3x20\"  wedge x   Heel raises 15x 1.5 lbs Added wt 10/17 x    3 way hip  2x10ea 1.5 lbs Added wt and incr.  sets 10/17 x   Squats  add    x          Supine           MET  x 5sec   10/7 hip/LE equal., 10/12 equal    Add sets  10x 5sec       SKTC 3x20       Fabers stretch 3x20       trA iso 10x       trA iso w/ marches 15x       Tra iso w/ alt UE 15x 1 lbs      Tra iso alt same UE/LE 10x 1lb U/LE       Dead bug 10x 1lbs U/LE      Bridges 2x10 3\"  x   PPTs 5x      SLRs 10x 1 lbs  x   SLR w/ ER 10x A  Restarted 10/17 x   Hip abd 2x10 purple  Incr. Resistance 10/17 x   Hip flexor stretch 3x30\"  Off side of mat           Seated           Add sets  5x 5sec        HS stretch w/ stool 3x30\"  jonathan x   SB rollout 10x5\"  Flexion       Jonathan LAQs w/ ball 10x 1.5 lbs  Added wt and ball  10/17 x   Piriformis stertch 3x20\"  Sitting EOB, R    Ankle circles  HEP        Ankle tband HEP blue 4 way ankle                     Prone         Prone lying 2 mins   x   Glute sets 15x5\"   x   Prone on elbows 10x  Added 10/17 x   Press ups 10x  Added 10/17 x   Jonathan HS curls 10x   x   Jonathan hip flexor stretch 3x20\"  Strap,  jonathan added 10/17 x   Hip ext  2x5x A  x                         Other:      Specific Instructions for next treatment: check pelvis, correct pelvic rotation, prone  progress spinal stab ex, ok for modalities to decrease pain, aid healing. Treatment Charges: Mins Units   []  Modalities     [x]  Ther Exercise  55 4   []  Manual Therapy     []  Ther Activities     []  Aquatics     []  Vasocompression     []  Other     Total Treatment time 55 4       Assessment: [x] Progressing toward goals  Focus on gluteal, HS, and quad strengthening with added resistance/or progressed resistant. Fatigues with gluteals. [] No change. [x] Other: Reported R anterior hip/groin discomfort post standing resistive SLR, addressed prone exercises with hip pain resolved. [x] Patient would continue to benefit from skilled physical therapy services in order to:  decrease pain R hip, R thigh, R LB, increase ROM R hip, R knee, lumbar, increase strength R LE, L hip, spinal stab muscles, and improve tolerance to laying, activity and steps.     STG: (to be met in 10 treatments)  ? Pain:R hip, thigh, LB, average 5/10, 7/10 at worst  ? ROM: R hip flex 100°, R knee, Lumbar AROM WNL without increase pain   ? Strength: R hip 3+/5, R knee 4/5, DF 4/5, L hip 4-/5, L knee ext 4/5, spinal stab muscles as evidenced by ability to perform beginning  spinal stab ex program  ? Function: LEFS 54% loss of LE function   Pt report improved tolerance to laying and getting up from something low  Patient to be independent with home exercise program as demonstrated by performance with correct form without cues. LTG: (to be met in 20 treatments)  ? Pain:R hip, thigh, LB, average 3/10, 5/10 at worst   ? ROM: R hip flex 108°  ? Strength: R hip 4-/5, R knee 4+/5, DF 4+/5, L hip 4/5, L knee ext 4+/5, spinal stab muscles as evidenced by ability to perform moderate spinal stab ex program  ? Function: LEFS 40% loss of LE function   Pt report improved tolerance to activity and steps                    Patient goals: decrease pain, able to do things longer time     Pt. Education:  [x] Yes  [] No  [x] Reviewed Prior HEP/Ed  Method of Education: [x] Verbal  [x] Demo  [x] Written  Comprehension of Education:  [x] Verbalizes understanding. [x] Demonstrates understanding. [] Needs review. [x] Demonstrates/verbalizes HEP/Ed previously given. HEP-Access Code: [email or web address]  URL: [email or web address]. [email or web address]  Supine Hip Adduction Isometric with Ball - 5 x daily - 7 x weekly - 10 reps - 5 seconds hold  Seated Hip Adduction Isometrics with Ball - 5 x daily - 7 x weekly - 10 reps - 5 sec hold  Prone Gluteal Sets - 5-10 x daily - 7 x weekly - 10 reps - 5 seconds hold    10/3/22: [employer] handouts (error that access code not copied over)  Hamstring stretch 2x daily 7x/wk/ 3x 30 seconds  Piriformis stertch 2x daily 7x/wk/ 3x 30 seconds, sitting. Hip flexor stretch 2x daily 7x/wk/ 3x 30 seconds, supine w/strap    Access Code: [email or web address]  URL: [email or web address]/  Date: 10/07/2022  Exercises  Seated Ankle Dorsiflexion AROM - 1 x daily - 7 x weekly - 2 sets - 10 reps  Seated Ankle Circles - 1 x daily - 7 x weekly - 2 sets - 10 reps  Ankle Dorsiflexion with Resistance - 1 x daily - 5 x weekly - 2 sets - 10 reps  Ankle Eversion with Resistance - 1 x daily - 5 x weekly - 2 sets - 10 reps  Ankle Inversion with Resistance - 1 x daily - 5 x weekly - 2 sets - 10 reps  Ankle and Toe Plantarflexion with Resistance - 1 x daily - 5 x weekly - 2 sets - 10 reps  10/7/22: blue tband. Access Code: [email or web address]  URL: [employer]. com/  Date: 10/12/2022  Exercises  Gastroc Stretch on Wall - 1 x daily - 4 x weekly - 3 sets - 10 reps  Standing Heel Raise with Support - 1 x daily - 4 x weekly - 3 sets - 10 reps  Standing Hip Abduction with Counter Support - 1 x daily - 4 x weekly - 3 sets - 10 reps  Standing Hip Extension with Counter Support - 1 x daily - 4 x weekly - 3 sets - 10 reps  Standing Hip Flexion AROM - 1 x daily - 4 x weekly - 3 sets - 10 reps  Access Code: [email or web address]  URL: [employer]/  Date: 10/17/2022  Exercises  Supine Posterior Pelvic Tilt - 1 x daily - 7 x weekly - 2 sets - 10 reps  Supine March with Posterior Pelvic Tilt - 1 x daily - 7 x weekly - 2 sets - 10 reps  Supine Hip Adduction Isometric with Ball - 1 x daily - 7 x weekly - 2 sets - 10 reps  Dead Bug - 1 x daily - 7 x weekly - 2 sets - 10 reps      Plan: [x] Continue current frequency toward long and short term goals.     [x] Specific Instructions for subsequent treatments: lumbar ROM, hip stretching, stabilization of hips,       Frequency:  2 x/week for 20 visits      Time In:  9656        Time Out:    1135    Electronically signed by:  Gideon Ventura PTA

## 2022-10-19 RX ORDER — ERGOCALCIFEROL 1.25 MG/1
50000 CAPSULE ORAL WEEKLY
Qty: 6 CAPSULE | Refills: 0 | Status: SHIPPED | OUTPATIENT
Start: 2022-10-19 | End: 2022-11-24

## 2022-10-19 NOTE — TELEPHONE ENCOUNTER
E-scribe request for med refill. Please review and e-scribe if applicable.      Last Visit Date:  2022  Next Visit Date:  2022    Hemoglobin A1C (%)   Date Value   2022 5.5             ( goal A1C is < 7)   No results found for: LABMICR  No results found for: LDLCHOLESTEROL, LDLCALC    (goal LDL is <100)   AST (U/L)   Date Value   2018 11     ALT (U/L)   Date Value   2018 7     BUN (mg/dL)   Date Value   2022 8     BP Readings from Last 3 Encounters:   22 (!) 141/101   22 112/77   22 113/88          (goal 120/80)        Patient Active Problem List:     Asthma     BMI 47.6     History of  (G8)     Sickle cell trait      History of      Hx of SAB x4     History of blood transfusion     H/O Indicated PTD     Chronic migraine without aura with status migrainosus, not intractable     Increased insulin level     Insomnia     Memory impairment      18 M Apg 8/9 Wt 6#10      Abnormal uterine bleeding (AUB)     Dysmenorrhea     Painful lumpy breasts     Mirena IUD insertion 22     Vitamin D deficiency     Right hip pain      ----Awilda Jansen

## 2022-10-21 ENCOUNTER — HOSPITAL ENCOUNTER (OUTPATIENT)
Dept: PHYSICAL THERAPY | Age: 37
Setting detail: THERAPIES SERIES
Discharge: HOME | End: 2022-10-21
Payer: COMMERCIAL

## 2022-10-21 PROCEDURE — 97110 THERAPEUTIC EXERCISES: CPT

## 2022-10-21 NOTE — FLOWSHEET NOTE
[x] [facility]) - [facility] &  Therapy  [address]  [phone]  F: [phone] [] [address]  [facility]   [address]  P: [phone]  F: [phone] [] [address]  [address]  P: [phone]  F: [phone] [] [address]  P: [phone]  F: [phone] [] [address]  [facility]   [address]   [place]: [phone]  F: [phone]      Physical Therapy Daily Treatment Note    Date:  10/21/2022  Patient Name:  Shaheen Scott    :  1985  MRN: [id number]  Physician: Dedra Rdz MD                                       Insurance: [employer] (limit 60 Rx), [place] Advantage (Auth after 30 Rx)  Medical Diagnosis: Right hip pain M25.551                          Rehab Codes: M25.551, M79.651, M54.59, M25.561, M25.651, M25.661, M62.551,  M62.591 R26.89, R29.3  Onset date: 2022                     Next 's appt. :  tbd  Visit# / total visits:     Cancels/No Shows: 0/0    Subjective:    Pain:  [] Yes  [x] No  Location:  R thigh           Pain Rating: (0-10 scale) 1-2/10 R anterior/lateral hip/groin  Pain altered Tx:  [] Yes  [x] No  Action:  Comments:     Complains of 1-2/10 pain the past 2 days. Objective:  Modalities:  CP to LB during sitting ankle exercise.  -HELD 10/12                     Large HP to R hip sidelying, pillow between LE's for comfort x 10 min  Precautions:  Exercises:10/21/2022 completed with X  Exercise   R thigh Reps/ Time Weight/ Level Comments 10/21/2022    X   SciFit  5 min L3    x          standing        Gastro stretch 3x20\"  wedge x   Heel raises 20x 1.5 lbs Increased reps 10/21 x    3 way hip  20x 1.5 lbs Increased reps 10/ x   Squats  10x   Added 10/ x          Supine MET  x 5sec   10/7 hip/LE equal., 10/12 equal    Add sets  10x 5sec       SKTC 3x20       Fabers stretch 3x20       trA iso 10x       trA iso w/ marches 15x       Tra iso w/ alt UE 15x 1 lbs      Tra iso alt same UE/LE 10x 1lb U/LE       Dead bug 10x 1lbs U/LE      Bridges 20x 3\" Increased reps 10/21 x   PPTs 5x      SLRs 10x 1 lbs  x   SLR w/ ER 10x A  x   Hip abd 20x purple  x   Hip flexor stretch 3x30\"  Off side of mat           Seated           Add sets  5x 5sec        HS stretch w/ stool 3x30\"  jonathan x   SB rollout 10x5\"  Flexion       Jonathan LAQs w/ ball 10x 1.5 lbs  x   Piriformis stertch 3x20\"  Sitting EOB, R    Ankle circles  HEP        Ankle tband HEP blue 4 way ankle                     Prone         Prone lying 2 mins   x   Glute sets 15x5\"   x   Prone on elbows 10x   x   Press ups 10x   x   Jonathan HS curls 20x  Increased reps 10/21 x   Jonathan hip flexor stretch 3x20\"  Strap,  jonathan added 10/17 x   Hip ext  2x5x A  x                         Other:      Specific Instructions for next treatment: check pelvis, correct pelvic rotation, prone  progress spinal stab ex, ok for modalities to decrease pain, aid healing. Treatment Charges: Mins Units   [x]  Modalities 10 0   [x]  Ther Exercise  55 4   []  Manual Therapy     []  Ther Activities     []  Aquatics     []  Vasocompression     []  Other     Total Treatment time 55 4       Assessment: [x] Progressing toward goals  Able to increase reps of exercise as above with good tolerance for increased strength. [] No change. [x] Other:  Patient requested trial of HP to right hip after exercise, reported some pain relief. [x] Patient would continue to benefit from skilled physical therapy services in order to:  decrease pain R hip, R thigh, R LB, increase ROM R hip, R knee, lumbar, increase strength R LE, L hip, spinal stab muscles, and improve tolerance to laying, activity and steps. STG: (to be met in 10 treatments)  ?  Pain:R hip, thigh, LB, average 5/10, 7/10 at worst  ? ROM: R hip flex 100°, R knee, Lumbar AROM WNL without increase pain   ? Strength: R hip 3+/5, R knee 4/5, DF 4/5, L hip 4-/5, L knee ext 4/5, spinal stab muscles as evidenced by ability to perform beginning  spinal stab ex program  ? Function: LEFS 54% loss of LE function   Pt report improved tolerance to laying and getting up from something low  Patient to be independent with home exercise program as demonstrated by performance with correct form without cues. LTG: (to be met in 20 treatments)  ? Pain:R hip, thigh, LB, average 3/10, 5/10 at worst   ? ROM: R hip flex 108°  ? Strength: R hip 4-/5, R knee 4+/5, DF 4+/5, L hip 4/5, L knee ext 4+/5, spinal stab muscles as evidenced by ability to perform moderate spinal stab ex program  ? Function: LEFS 40% loss of LE function   Pt report improved tolerance to activity and steps                    Patient goals: decrease pain, able to do things longer time     Pt. Education:  [x] Yes  [] No  [x] Reviewed Prior HEP/Ed  Method of Education: [x] Verbal  [x] Demo  [] Written  Comprehension of Education:  [x] Verbalizes understanding. [x] Demonstrates understanding. [] Needs review. [x] Demonstrates/verbalizes HEP/Ed previously given. HEP-Access Code: [email or web address]  URL: [employer]/  Supine Hip Adduction Isometric with Ball - 5 x daily - 7 x weekly - 10 reps - 5 seconds hold  Seated Hip Adduction Isometrics with Ball - 5 x daily - 7 x weekly - 10 reps - 5 sec hold  Prone Gluteal Sets - 5-10 x daily - 7 x weekly - 10 reps - 5 seconds hold    10/3/22: [employer] handouts (error that access code not copied over)  Hamstring stretch 2x daily 7x/wk/ 3x 30 seconds  Piriformis stertch 2x daily 7x/wk/ 3x 30 seconds, sitting. Hip flexor stretch 2x daily 7x/wk/ 3x 30 seconds, supine w/strap    Access Code: [email or web address]  URL: [employer]/  Date: 10/07/2022  Exercises  Seated Ankle Dorsiflexion AROM - 1 x daily - 7 x weekly - 2 sets - 10 reps  Seated Ankle Circles - 1 x daily - 7 x weekly - 2 sets - 10 reps  Ankle Dorsiflexion with Resistance - 1 x daily - 5 x weekly - 2 sets - 10 reps  Ankle Eversion with Resistance - 1 x daily - 5 x weekly - 2 sets - 10 reps  Ankle Inversion with Resistance - 1 x daily - 5 x weekly - 2 sets - 10 reps  Ankle and Toe Plantarflexion with Resistance - 1 x daily - 5 x weekly - 2 sets - 10 reps  10/7/22: blue tband. Access Code: [email or web address]  URL: [employer]/  Date: 10/12/2022  Exercises  Gastroc Stretch on Wall - 1 x daily - 4 x weekly - 3 sets - 10 reps  Standing Heel Raise with Support - 1 x daily - 4 x weekly - 3 sets - 10 reps  Standing Hip Abduction with Counter Support - 1 x daily - 4 x weekly - 3 sets - 10 reps  Standing Hip Extension with Counter Support - 1 x daily - 4 x weekly - 3 sets - 10 reps  Standing Hip Flexion AROM - 1 x daily - 4 x weekly - 3 sets - 10 reps  Access Code: [email or web address]  URL: [employer]/  Date: 10/17/2022  Exercises  Supine Posterior Pelvic Tilt - 1 x daily - 7 x weekly - 2 sets - 10 reps  Supine March with Posterior Pelvic Tilt - 1 x daily - 7 x weekly - 2 sets - 10 reps  Supine Hip Adduction Isometric with Ball - 1 x daily - 7 x weekly - 2 sets - 10 reps  Dead Bug - 1 x daily - 7 x weekly - 2 sets - 10 reps      Plan: [x] Continue current frequency toward long and short term goals.     [x] Specific Instructions for subsequent treatments: lumbar ROM, hip stretching, stabilization of hips,       Frequency:  2 x/week for 20 visits      Time In:  1100        Time Out:    0604    Electronically signed by:  India James PTA

## 2022-10-25 ENCOUNTER — HOSPITAL ENCOUNTER (OUTPATIENT)
Dept: PHYSICAL THERAPY | Age: 37
Setting detail: THERAPIES SERIES
Discharge: HOME | End: 2022-10-25
Payer: COMMERCIAL

## 2022-10-25 PROCEDURE — 97110 THERAPEUTIC EXERCISES: CPT

## 2022-10-25 NOTE — FLOWSHEET NOTE
[x] [facility]  [address]  [phone]  F: [phone] [] [address]  [facility]   [address]  P: [phone]  F: [phone] [] [address]  [address]  P: [phone]  F: [phone] [] [address]  P: [phone]  F: [phone] [] [address]  [facility]   [address]   [place]: [phone]  F: [phone]      Physical Therapy Daily Treatment Note    Date:  10/25/2022  Patient Name:  Trena Kanner    :  1985  MRN: [id number]  Physician: Jose F Quigley MD                                       Insurance: [place] (limit 60 Rx), [place] Advantage (Auth after 30 Rx)  Medical Diagnosis: Right hip pain M25.551                          Rehab Codes: M25.551, M79.651, M54.59, M25.561, M25.651, M25.661, M62.551,  M62.591 R26.89, R29.3  Onset date: 2022                     Next 's appt. :  tbd  Visit# / total visits:     Cancels/No Shows: 0/0    Subjective:    Pain:  [] Yes  [x] No  Location:  R thigh           Pain Rating: (0-10 scale) 0/10 R anterior/lateral hip/groin  Pain altered Tx:  [] Yes  [x] No  Action:  Comments:   Reports no pain with arrival, just a twinge  of discomfort in hip that is intermittent, no pain yesterday. Addressing HEP. Reports therapy is helping       Objective:  Modalities:  CP to LB during sitting ankle exercise. Large HP to R hip sidelying, pillow between LE's for comfort x 10 min  Precautions:  Exercises:10/25/2022 completed with X  Exercise   R thigh Reps/ Time Weight/ Level Comments 10/25/2022    X   SciFit  6 min L4   Incr.  Resistance 10/25 x          standing        Gastro stretch 3x20\"  wedge x   Heel raises 20x 1.5 lbs   x    3 way hip  2x10 1.5 lbs  x   Squats  2x10   Incr. Sets 10/25 x          Supine           MET  x 5sec  Gunshot tech 1 set, then R LE MET, HS activivation with correction. Also education on self correction w. Pt voicing understanding -10/25 x   Add sets  10x 5sec       SKTC 3x20       Fabers stretch 3x20       trA iso 10x       trA iso w/ marches 2x10  2 lbs Incr. Wt and sets Reps 10/25 x   Tra iso w/ alt UE 1x10  2 lbs Incr. Wt  10/25  x   Tra iso alt same UE/LE 2x10 2  lbs Incr. Wt and sets 10/25     Dead bug 10x       Bridges 20x 3\" I  x   PPTs 5x      SLRs 10x 1 lbs  x   SLR w/ ER 10x 1 lbs   x   Hip abd 20x purple  x   Hip flexor stretch 3x30\"  Off side of mat           Seated           Add sets  5x 5sec        HS stretch w/ stool 3x30\"  jonathan x   SB rollout 10x5\"  Flexion       Jonathan LAQs w/ ball 15x 2 lbs Incr. Wt 10/25 x   Piriformis stertch 3x20\"  Sitting EOB, R    Iso abdom/Neutral spine 10x5\"  Added 10/25 x   Neutral spine w/ march 2x10 2 lbs Added 10/25 x   Ankle circles HEP        Ankle tband HEP blue 4 way ankle                     Prone         Prone lying 2 mins       Glute sets 15x5\"      Prone on elbows 10x      Press ups 10x      Jonathan HS curls 20x  Increased reps 10/21    Jonathan hip flexor stretch 3x20\"  Strap,  jonathan added 10/17    Hip ext  2x5x A                           Other:      Specific Instructions for next treatment:  check/correct pelvic rotation, prone progress spinal stab ex, ok for modalities to decrease pain, aid healing. Treatment Charges: Mins Units   [x]  Modalities HP  --    [x]  Ther Exercise 45 3   []  Manual Therapy     []  Ther Activities     []  Aquatics     []  Vasocompression     []  Other     Total Treatment time 45 3                       Assessment: [x] Progressing toward goals  corrected hip rotation with MET and educated pt on self correction with pt voicing understanding.  Progressed core strengthening in supine and added sitting core strengthening with good tolerance. No reports of hip pain. [] No change. [x] Other:   .  [x] Patient would continue to benefit from skilled physical therapy services in order to:  decrease pain R hip, R thigh, R LB, increase ROM R hip, R knee, lumbar, increase strength R LE, L hip, spinal stab muscles, and improve tolerance to laying, activity and steps. STG: (to be met in 10 treatments)  ? Pain:R hip, thigh, LB, average 5/10, 7/10 at worst  ? ROM: R hip flex 100°, R knee, Lumbar AROM WNL without increase pain   ? Strength: R hip 3+/5, R knee 4/5, DF 4/5, L hip 4-/5, L knee ext 4/5, spinal stab muscles as evidenced by ability to perform beginning  spinal stab ex program  ? Function: LEFS 54% loss of LE function   Pt report improved tolerance to laying and getting up from something low  Patient to be independent with home exercise program as demonstrated by performance with correct form without cues. LTG: (to be met in 20 treatments)  ? Pain:R hip, thigh, LB, average 3/10, 5/10 at worst   ? ROM: R hip flex 108°  ? Strength: R hip 4-/5, R knee 4+/5, DF 4+/5, L hip 4/5, L knee ext 4+/5, spinal stab muscles as evidenced by ability to perform moderate spinal stab ex program  ? Function: LEFS 40% loss of LE function   Pt report improved tolerance to activity and steps                    Patient goals: decrease pain, able to do things longer time     Pt. Education:  [x] Yes  [] No  [x] Reviewed Prior HEP/Ed  Method of Education: [x] Verbal  [x] Demo  [] Written  Comprehension of Education:  [x] Verbalizes understanding. [x] Demonstrates understanding. [] Needs review. [x] Demonstrates/verbalizes HEP/Ed previously given. HEP-Access Code: [email or web address]  URL: [email or web address]/  Supine Hip Adduction Isometric with Ball - 5 x daily - 7 x weekly - 10 reps - 5 seconds hold  Seated Hip Adduction Isometrics with Ball - 5 x daily - 7 x weekly - 10 reps - 5 sec hold  Prone Gluteal Sets - 5-10 x daily - 7 x weekly - 10 reps - 5 seconds hold    10/3/22: [employer] handouts (error that access code not copied over)  Hamstring stretch 2x daily 7x/wk/ 3x 30 seconds  Piriformis stertch 2x daily 7x/wk/ 3x 30 seconds, sitting. Hip flexor stretch 2x daily 7x/wk/ 3x 30 seconds, supine w/strap    Access Code: [email or web address]  URL: [employer]/  Date: 10/07/2022  Exercises  Seated Ankle Dorsiflexion AROM - 1 x daily - 7 x weekly - 2 sets - 10 reps  Seated Ankle Circles - 1 x daily - 7 x weekly - 2 sets - 10 reps  Ankle Dorsiflexion with Resistance - 1 x daily - 5 x weekly - 2 sets - 10 reps  Ankle Eversion with Resistance - 1 x daily - 5 x weekly - 2 sets - 10 reps  Ankle Inversion with Resistance - 1 x daily - 5 x weekly - 2 sets - 10 reps  Ankle and Toe Plantarflexion with Resistance - 1 x daily - 5 x weekly - 2 sets - 10 reps  10/7/22: blue tband. Access Code: [email or web address]  URL: [employer]/  Date: 10/12/2022  Exercises  Gastroc Stretch on Wall - 1 x daily - 4 x weekly - 3 sets - 10 reps  Standing Heel Raise with Support - 1 x daily - 4 x weekly - 3 sets - 10 reps  Standing Hip Abduction with Counter Support - 1 x daily - 4 x weekly - 3 sets - 10 reps  Standing Hip Extension with Counter Support - 1 x daily - 4 x weekly - 3 sets - 10 reps  Standing Hip Flexion AROM - 1 x daily - 4 x weekly - 3 sets - 10 reps  Access Code: [email or web address]  URL: [email or web address]/  Date: 10/17/2022  Exercises  Supine Posterior Pelvic Tilt - 1 x daily - 7 x weekly - 2 sets - 10 reps  Supine March with Posterior Pelvic Tilt - 1 x daily - 7 x weekly - 2 sets - 10 reps  Supine Hip Adduction Isometric with Ball - 1 x daily - 7 x weekly - 2 sets - 10 reps  Dead Bug - 1 x daily - 7 x weekly - 2 sets - 10 reps      Plan: [x] Continue current frequency toward long and short term goals.     [x] Specific Instructions for subsequent treatments: lumbar ROM, hip stretching, stabilization of hips,       Frequency:  2 x/week for 20 visits      Time In: 1224        Time Out:     1320    Electronically signed by:  Liban Castle, PTA

## 2022-10-28 ENCOUNTER — HOSPITAL ENCOUNTER (OUTPATIENT)
Dept: PHYSICAL THERAPY | Age: 37
Setting detail: THERAPIES SERIES
Discharge: HOME | End: 2022-10-28
Payer: COMMERCIAL

## 2022-10-28 PROCEDURE — 97110 THERAPEUTIC EXERCISES: CPT

## 2022-10-28 NOTE — FLOWSHEET NOTE
[x] [facility] &  Therapy  [address]  [phone]  F: [phone] [] [address]  [facility] [address]   [address]  P: [phone]  F: [phone] [] [address]  [address]  P: [phone]  F: [phone] [] [address]  P: [phone]  F: [phone] [] [address]  [facility]   [address]   [place]: [phone]  F: [phone]      Physical Therapy Daily Treatment Note    Date:  10/28/2022  Patient Name:  Kevin Lao    :  1985  MRN: [id number]  Physician: Phillip Pérez MD                                       Insurance: [employer] (limit 60 Rx), [employer] (Auth after 30 Rx)  Medical Diagnosis: Right hip pain M25.551                          Rehab Codes: M25.551, M79.651, M54.59, M25.561, M25.651, M25.661, M62.551,  M62.591 R26.89, R29.3  Onset date: 2022                     Next 's appt. :  tbd  Visit# / total visits:     Cancels/No Shows: 0/0    Subjective:    Pain:  [] Yes  [x] No  Location:  R thigh           Pain Rating: (0-10 scale) 0/10 R anterior/lateral hip/groin  Pain altered Tx:  [] Yes  [x] No  Action:  Comments:    No reports of pain, did reports soreness for a little over 24 hrs after prior session. Addressing HEP. Objective:  Modalities:  CP to LB during sitting ankle exercise. Large HP to R hip sidelying, pillow between LE's for comfort x 10 min  Precautions:  Exercises:10/28/2022 completed with X  Exercise   R thigh/hip/LB Reps/ Time Weight/ Level Comments 10/28/2022    X   SciFit  6 min L4   Incr.  Resistance 10/25 x          standing        Gastro stretch 3x20\"  wedge    Heel raises 20x 1.5 lbs   x    3 way hip  2x10 1.5 lbs letty x Squats  2x10        Scap. retraction 1x10 blue Added 10/28 x   Standing groin stretch 1x20\"  Attempted but bothered left knee, held, added 10/28 x   Supine           MET  x 5sec  Gunshot tech 1 set, then R LE MET, HS activivation with correction. Also education on self correction w. Pt voicing understanding -10/25  10/28- equal did not need MET x   Add sets  10x 5sec       SKTC 3x20       Fabers stretch 3x20       trA iso 5x5\"    x   trA iso w/ marches 2x10  2 lbs Incr. Wt and sets Reps 10/25 x   Tra iso w/ alt UE 2x10  2 lbs Incr. Wt  10/25  x   Tra iso alt same UE/LE 2x10 2  lbs Incr. Wt and sets 10/25     Dead bug 2x10  Incr. Sets 10/28 x   Bridges 10x 3\"  x   SLRs 10x 1 lbs  x   SLR w/ ER 10x 1 lbs      Hip abd 20x purple     Hip flexor stretch 3x30\"  Off side of mat    Groin stretch 1x20\"  Did not feel stretching, attempted in standing.  x          Seated           Add sets  5x 5sec        HS stretch w/ stool 3x30\"  jonathan    SB rollout 10x5\"  Flexion       Jonathan LAQs w/ ball 15x 2 lbs Incr. Wt 10/25    Piriformis stertch 3x20\"  Sitting EOB,   x   Iso abdom/Neutral spine 10x5\"  Added 10/25    Neutral spine w/ march 2x10 2 lbs Added 10/25    Ankle circles HEP        Ankle tband HEP blue 4 way ankle                     Prone         Prone lying 2 mins    x   Glute sets 15x5\"      Prone on elbows 10x   x   Press ups 10x   x   Jonathan HS curls 10x    x   Jonathan hip flexor stretch 3x30\"  Strap, blue roll   x   Hip ext  10x A  x                         Other:      Specific Instructions for next treatment:  check/correct pelvic rotation, prone progress spinal stab ex, ok for modalities to decrease pain, aid healing. Treatment Charges: Mins Units   [x]  Modalities HP  --    [x]  Ther Exercise  55 4   []  Manual Therapy     []  Ther Activities     []  Aquatics     []  Vasocompression     []  Other     Total Treatment time 55 4       Assessment: [x] Progressing toward goals pelvis aligned, no correction needed.  Focus on gluteal and core strengthening in various planes. Addressed groin, piriformis and hip flexor stretching with good tolerance to later two. [] No change. [] Other:     [x] Patient would continue to benefit from skilled physical therapy services in order to:  decrease pain R hip, R thigh, R LB, increase ROM R hip, R knee, lumbar, increase strength R LE, L hip, spinal stab muscles, and improve tolerance to laying, activity and steps. STG: (to be met in 10 treatments)  ? Pain:R hip, thigh, LB, average 5/10, 7/10 at worst 10/28/22 MET 4-5 worst, 0/10 at times. ? ROM: R hip flex 100°, R knee, Lumbar AROM WNL without increase pain 10/28 MET, R knee 127° supine, foot on mat, Lumbar spine WNL tenisha  ? Strength: R hip 3+/5, R knee 4/5, DF 4/5, L hip 4-/5, L knee ext 4/5, spinal stab muscles as evidenced by ability to perform beginning  spinal stab ex program  ? Function: LEFS 54% loss of LE function   Pt report improved tolerance to laying and getting up from something low  Patient to be independent with home exercise program as demonstrated by performance with correct form without cues. LTG: (to be met in 20 treatments)  ? Pain:R hip, thigh, LB, average 3/10, 5/10 at worst   ? ROM: R hip flex 108°  ? Strength: R hip 4-/5, R knee 4+/5, DF 4+/5, L hip 4/5, L knee ext 4+/5, spinal stab muscles as evidenced by ability to perform moderate spinal stab ex program  ? Function: LEFS 40% loss of LE function   Pt report improved tolerance to activity and steps                    Patient goals: decrease pain, able to do things longer time     Pt. Education:  [x] Yes  [] No  [x] Reviewed Prior HEP/Ed  Method of Education: [x] Verbal  [x] Demo  [] Written  Comprehension of Education:  [x] Verbalizes understanding. [x] Demonstrates understanding. [] Needs review. [x] Demonstrates/verbalizes HEP/Ed previously given. HEP-Access Code: [email or web address]  URL: [employer]/  Supine Hip Adduction Isometric with Fazrana Lester - 5 x daily - 7 x weekly - 10 reps - 5 seconds hold  Seated Hip Adduction Isometrics with Ball - 5 x daily - 7 x weekly - 10 reps - 5 sec hold  Prone Gluteal Sets - 5-10 x daily - 7 x weekly - 10 reps - 5 seconds hold    10/3/22: [email or web address] handouts (error that access code not copied over)  Hamstring stretch 2x daily 7x/wk/ 3x 30 seconds  Piriformis stertch 2x daily 7x/wk/ 3x 30 seconds, sitting. Hip flexor stretch 2x daily 7x/wk/ 3x 30 seconds, supine w/strap    Access Code: [email or web address]  URL: [email or web address]  Date: 10/07/2022  Exercises  Seated Ankle Dorsiflexion AROM - 1 x daily - 7 x weekly - 2 sets - 10 reps  Seated Ankle Circles - 1 x daily - 7 x weekly - 2 sets - 10 reps  Ankle Dorsiflexion with Resistance - 1 x daily - 5 x weekly - 2 sets - 10 reps  Ankle Eversion with Resistance - 1 x daily - 5 x weekly - 2 sets - 10 reps  Ankle Inversion with Resistance - 1 x daily - 5 x weekly - 2 sets - 10 reps  Ankle and Toe Plantarflexion with Resistance - 1 x daily - 5 x weekly - 2 sets - 10 reps  10/7/22: blue tband. Access Code: [email or web address]  URL: [employer]/  Date: 10/12/2022  Exercises  Gastroc Stretch on Wall - 1 x daily - 4 x weekly - 3 sets - 10 reps  Standing Heel Raise with Support - 1 x daily - 4 x weekly - 3 sets - 10 reps  Standing Hip Abduction with Counter Support - 1 x daily - 4 x weekly - 3 sets - 10 reps  Standing Hip Extension with Counter Support - 1 x daily - 4 x weekly - 3 sets - 10 reps  Standing Hip Flexion AROM - 1 x daily - 4 x weekly - 3 sets - 10 reps  Access Code: [email or web address]  URL: [employer]/  Date: 10/17/2022  Exercises  Supine Posterior Pelvic Tilt - 1 x daily - 7 x weekly - 2 sets - 10 reps  Supine March with Posterior Pelvic Tilt - 1 x daily - 7 x weekly - 2 sets - 10 reps  Supine Hip Adduction Isometric with Ball - 1 x daily - 7 x weekly - 2 sets - 10 reps  Dead Bug - 1 x daily - 7 x weekly - 2 sets - 10 reps  Access Code: [facility]  URL: [email or web address]  Date: 10/28/2022  Prepared by: Jonah Alfonso    Exercises  Prone Hip Flexor Stretch on Table with Strap - 1 x daily - 7 x weekly - 1 sets - 3 reps - 30 hold      Plan: [x] Continue current frequency toward long and short term goals.     [x] Specific Instructions for subsequent treatments: lumbar ROM, hip stretching, stabilization of hips,       Frequency:  2 x/week for 20 visits      Time In:  1230        Time Out:    1440     Electronically signed by:  Carolina Cook PTA

## 2022-11-02 ENCOUNTER — HOSPITAL ENCOUNTER (OUTPATIENT)
Dept: PHYSICAL THERAPY | Age: 37
Setting detail: THERAPIES SERIES
Discharge: HOME | End: 2022-11-02
Payer: COMMERCIAL

## 2022-11-02 PROCEDURE — 97110 THERAPEUTIC EXERCISES: CPT

## 2022-11-02 NOTE — FLOWSHEET NOTE
[x] [facility] &  Therapy  [address]  [phone]  F: [phone] [] [address]  [facility]   [address]  P: [phone]  F: [phone] [] [address]  [address]  P: [phone]  F: [phone] [] [address]  P: [phone]  F: [phone] [] [address]  [facility]   [address]   [place]: [phone]  F: [phone]      Physical Therapy Daily Treatment Note    Date:  2022  Patient Name:  Lexx Li    :  1985  MRN: [id number]  Physician: Maritza Clark MD                                       Insurance: [employer] (limit 60 Rx), [employer] (Auth after 30 Rx)  Medical Diagnosis: Right hip pain M25.551                          Rehab Codes: M25.551, M79.651, M54.59, M25.561, M25.651, M25.661, M62.551,  M62.591 R26.89, R29.3  Onset date: 2022                     Next 's appt. :  tbd  Visit# / total visits: 10/20 reassessment    Cancels/No Shows: 0/0    Subjective:    Pain:  [] Yes  [x] No  Location:  R thigh           Pain Rating: (0-10 scale) 0/10 R anterior/lateral hip/groin  Pain altered Tx:  [] Yes  [x] No  Action:  Comments:   Reports R hip/thigh is good but left foot  is bad due to daughter stepping on it. Objective:  Modalities:  as needed    Precautions:  Exercises:2022 completed with X  Exercise   R thigh/hip/LB Reps/ Time Weight/ Level Comments 2022    X   SciFit  7 min  L4 L3 11/2 x          standing        Gastro stretch 3x20\"  wedge x   Heel raises 20x     x    3 way hip  2x10 2 lbs Jonathan, incr.  Wt 11/2 x   Squats  2x10        Tband UE-       Scap. retraction 1x10 blue   x   Palloff press 12xea blue Added 11/2 x   Palloff walk outs  5x ea blue Added 11/2 x          Supine           MET  x 5sec  Gunshot tech 1 set, then R LE MET, HS activivation with correction. Also education on self correction w. Pt voicing understanding -10/25  10/28- equal did not need MET              SKTC 3x20       trA iso 5x5\"       trA iso w/ marches 2x10  2 lbs     Tra iso w/ alt UE 2x10  2 lbs     Tra iso alt same UE/LE 2x10 2  lbs     Dead bug 2x10      Bridges 10x 3\"      SLRs 10x 1 lbs     SLR w/ ER 10x 1 lbs      Hip abd 20x purple     Hip flexor stretch 3x30\"  Off side of mat    Groin stretch 1x20\"  Did not feel stretching, attempted in standing. Seated            HS stretch w/ stool 3x30\"  jonathan    SB rollout 10x5\"  Flexion       Jonathan LAQs w/ ball 15x 2 lbs Incr. Wt 10/25    Piriformis stertch 3x30\"  Sitting EOB,   x   Ankle circles HEP        Ankle tband HEP blue 4 way ankle      SB core    Red, added all 12/2    -PPT 10x   x   -PPT w/ alt UE 15x   x   -PPT w/ march 15x   x   - PPT w/ knee ext  10x   x   -ppt w/ alt opp ue/le 10x   x                 Prone          Prone lying 2 mins      Glute sets 15x5\"      Prone on elbows 10x      Press ups 10x      Jonathan HS curls 10x       Jonathan hip flexor stretch 3x30\"  Strap, blue roll      Hip ext  10x A                           Other:    Specific Instructions for next treatment:  check/correct pelvic rotation, progress core strengthening standing/sitting Hip flexor stretch. 11/2/22:    LEFS 11% LOSS OF FUNCTION      R L     Hip flex 11/2  4+/5 5/5     Quads  11/2 4+/5 4/5    HS 11/2 5/5 4+/5    DF 11/2 5/5 4+/5                                Treatment Charges: Mins Units   []  Modalities HP  --    [x]  Ther Exercise  49 3   []  Manual Therapy     []  Ther Activities     []  Aquatics     []  Vasocompression     []  Other     Total Treatment time 49 3       Assessment: [x] Progressing toward goals  Added core standing and stability ball exercises with good tolerance and control demonstrated. Progressed wt with 3 way hip.  Pt met all STG.     [] No change. [] Other:     [x] Patient would continue to benefit from skilled physical therapy services in order to:  decrease pain R hip, R thigh, R LB, increase ROM R hip, R knee, lumbar, increase strength R LE, L hip, spinal stab muscles, and improve tolerance to laying, activity and steps. STG: (to be met in 10 treatments)  ? Pain:R hip, thigh, LB, average 5/10, 7/10 at worst 10/28/22 MET 4-5 worst, 0/10 at times. ? ROM: R hip flex 100°, R knee, Lumbar AROM WNL without increase pain 10/28 MET, R knee 127° supine, foot on mat, Lumbar spine WNL tenisha  ? Strength: R hip 3+/5, R knee 4/5, DF 4/5, L hip 4-/5, L knee ext 4/5, spinal stab muscles as evidenced by ability to perform beginning  spinal stab ex program  ? Function: LEFS 54% loss of LE function 11/2/22:  Pt report improved tolerance to laying and getting up from something low 11/2/22; MET  Patient to be independent with home exercise program as demonstrated by performance with correct form without cues. 11/2/22; MET     LTG: (to be met in 20 treatments)  ? Pain:R hip, thigh, LB, average 3/10, 5/10 at worst   ? ROM: R hip flex 108°  ? Strength: R hip 4-/5, R knee 4+/5, DF 4+/5, L hip 4/5, L knee ext 4+/5, spinal stab muscles as evidenced by ability to perform moderate spinal stab ex program  ? Function: LEFS 40% loss of LE function   Pt report improved tolerance to activity and steps                    Patient goals: decrease pain, able to do things longer time     Pt. Education:  [x] Yes  [] No  [x] Reviewed Prior HEP/Ed  Method of Education: [x] Verbal  [x] Demo  [] Written  Comprehension of Education:  [x] Verbalizes understanding. [x] Demonstrates understanding. [] Needs review. [x] Demonstrates/verbalizes HEP/Ed previously given. HEP-Access Code: [email or web address]  URL: [email or web address]  Supine Hip Adduction Isometric with Ball - 5 x daily - 7 x weekly - 10 reps - 5 seconds hold  Seated Hip Adduction Isometrics with Ball - 5 x daily - 7 x weekly - 10 reps - 5 sec hold  Prone Gluteal Sets - 5-10 x daily - 7 x weekly - 10 reps - 5 seconds hold    10/3/22: [employer] handouts (error that access code not copied over)  Hamstring stretch 2x daily 7x/wk/ 3x 30 seconds  Piriformis stertch 2x daily 7x/wk/ 3x 30 seconds, sitting. Hip flexor stretch 2x daily 7x/wk/ 3x 30 seconds, supine w/strap    Access Code: [email or web address]  URL: [email or web address] com/  Date: 10/07/2022  Exercises  Seated Ankle Dorsiflexion AROM - 1 x daily - 7 x weekly - 2 sets - 10 reps  Seated Ankle Circles - 1 x daily - 7 x weekly - 2 sets - 10 reps  Ankle Dorsiflexion with Resistance - 1 x daily - 5 x weekly - 2 sets - 10 reps  Ankle Eversion with Resistance - 1 x daily - 5 x weekly - 2 sets - 10 reps  Ankle Inversion with Resistance - 1 x daily - 5 x weekly - 2 sets - 10 reps  Ankle and Toe Plantarflexion with Resistance - 1 x daily - 5 x weekly - 2 sets - 10 reps  10/7/22: blue tband. Access Code: [email or web address]  URL: [employer]/  Date: 10/12/2022  Exercises  Gastroc Stretch on Wall - 1 x daily - 4 x weekly - 3 sets - 10 reps  Standing Heel Raise with Support - 1 x daily - 4 x weekly - 3 sets - 10 reps  Standing Hip Abduction with Counter Support - 1 x daily - 4 x weekly - 3 sets - 10 reps  Standing Hip Extension with Counter Support - 1 x daily - 4 x weekly - 3 sets - 10 reps  Standing Hip Flexion AROM - 1 x daily - 4 x weekly - 3 sets - 10 reps  Access Code: [email or web address]  URL: [employer]/  Date: 10/17/2022  Exercises  Supine Posterior Pelvic Tilt - 1 x daily - 7 x weekly - 2 sets - 10 reps  Supine March with Posterior Pelvic Tilt - 1 x daily - 7 x weekly - 2 sets - 10 reps  Supine Hip Adduction Isometric with Ball - 1 x daily - 7 x weekly - 2 sets - 10 reps  Dead Bug - 1 x daily - 7 x weekly - 2 sets - 10 reps  Access Code: [facility]  URL: [email or web address] [email or web address]  Date: 10/28/2022   Prone Hip Flexor Stretch on Table with Strap - 1 x daily - 7 x weekly - 1 sets - 3 reps - 30 hold      Plan: [x] Continue current frequency toward long and short term goals. [x] Specific Instructions for subsequent treatments: lumbar ROM, hip stretching, stabilization of hips,      Will  decreasing to 1wk weekly for next 2-3 weeks/weaning with pt continuing with HEP. If pain remains absent or pt able to control with HEP will consider discharge at that time but primary PT to discuss add'l future LTG as well.   Frequency:  2 x/week for 20 visits      Time In:     1046      Time Out:     1150    Electronically signed by:  Alethea Staley PTA

## 2022-11-04 ENCOUNTER — HOSPITAL ENCOUNTER (OUTPATIENT)
Dept: PHYSICAL THERAPY | Age: 37
Setting detail: THERAPIES SERIES
Discharge: HOME | End: 2022-11-04
Payer: COMMERCIAL

## 2022-11-04 NOTE — FLOWSHEET NOTE
[x] [facility]) [facility] &  Therapy  [address]    [phone]  F: [phone]   [] [address]  [facility] [address]   [address]  P: [phone]  F: [phone]  [] [address]  [address]  P: [phone]  F: [phone] [] [address]  P: [phone]  F: [phone]  [] [address]   [address]   [place]: [phone]  F: [phone]   [] [address]  [place]: [phone]   F: [phone]     Physical Therapy Cancel/No Show note    Date: 2022  Patient: Trena Kanner  : 1985  MRN: [id number]    Cancels/No Shows to date:     For today's appointment patient:    [x]  Cancelled    [] Rescheduled appointment    [] No-show     Reason given by patient:    [x]  Patient ill    []  Conflicting appointment    [] No transportation      [] Conflict with work    [] No reason given    [] Weather related    [] COVID-19    [] Other:      Comments:        [x] Next appointment was confirmed    Electronically signed by: Carolina Cook PTA

## 2022-11-10 ENCOUNTER — HOSPITAL ENCOUNTER (OUTPATIENT)
Dept: PHYSICAL THERAPY | Age: 37
Setting detail: THERAPIES SERIES
Discharge: HOME | End: 2022-11-10
Payer: COMMERCIAL

## 2022-11-10 NOTE — FLOWSHEET NOTE
[x] [address]  [address]    [phone]  F: [phone]          Physical Therapy Cancel/No Show note    Date: 11/10/2022  Patient: Hector Jolly  : 1985  MRN: [id number]    Cancels/No Shows to date:     For today's appointment patient:    [x]  Cancelled    [] Rescheduled appointment    [] No-show     Reason given by patient:    []  Patient ill    []  Conflicting appointment    [] No transportation      [] Conflict with work    [] No reason given    [] Weather related    [] [id number]    [x] Other:      Comments:  Family Emergency.         [x] Next appointment was confirmed    Electronically signed by: Chirag Ratliff PT

## 2022-11-15 ENCOUNTER — HOSPITAL ENCOUNTER (OUTPATIENT)
Dept: PHYSICAL THERAPY | Age: 37
Setting detail: THERAPIES SERIES
Discharge: HOME | End: 2022-11-15
Payer: COMMERCIAL

## 2022-11-15 ENCOUNTER — HOSPITAL ENCOUNTER (OUTPATIENT)
Dept: PHYSICAL THERAPY | Age: 37
Setting detail: THERAPIES SERIES
End: 2022-11-15
Payer: COMMERCIAL

## 2022-11-15 NOTE — FLOWSHEET NOTE
[] [facility] - [facility] &  Therapy  [address]    [phone]  F: [phone]   [] [address]  [facility]   [address]  P: [phone]  F: [phone]  [] [address] &  Therapy  [address]  P: [phone]  F: [phone] [] [address]  P: [phone]  F: [phone]  [] [address]  [address] [facility] [address]   [address]   [place]: [phone]  F: [phone]   [] [facility]  [address]  [place]: [phone]   F: [phone]     Physical Therapy Cancel/No Show note    Date: 11/15/2022  Patient: Lianne Borrego  : 1985  MRN: [id number]    Cancels/No Shows to date: 3/0    For today's appointment patient:    [x]  Cancelled    [] Rescheduled appointment    [] No-show     Reason given by patient:    []  Patient ill    []  Conflicting appointment    [] No transportation      [] Conflict with work    [] No reason given    [] Weather related    [] COVID-19    [x] Other:      Comments:  Son ill. Addressing HEP.         [x] Next appointment was confirmed 22    Electronically signed by: Samira Hernandez PTA

## 2022-11-19 ENCOUNTER — HOSPITAL ENCOUNTER (EMERGENCY)
Age: 37
Discharge: HOME | End: 2022-11-19
Attending: EMERGENCY MEDICINE
Payer: COMMERCIAL

## 2022-11-19 ENCOUNTER — APPOINTMENT (OUTPATIENT)
Dept: GENERAL RADIOLOGY | Age: 37
End: 2022-11-19
Payer: COMMERCIAL

## 2022-11-19 VITALS
DIASTOLIC BLOOD PRESSURE: 84 MMHG | HEART RATE: 91 BPM | RESPIRATION RATE: 13 BRPM | TEMPERATURE: 98.5 F | BODY MASS INDEX: 47.71 KG/M2 | SYSTOLIC BLOOD PRESSURE: 133 MMHG | WEIGHT: 243 LBS | OXYGEN SATURATION: 100 % | HEIGHT: 60 IN

## 2022-11-19 DIAGNOSIS — J06.9 VIRAL URI WITH COUGH: Primary | ICD-10-CM

## 2022-11-19 LAB
ABSOLUTE EOS #: 0.37 K/UL (ref 0–0.44)
ABSOLUTE IMMATURE GRANULOCYTE: 0.05 K/UL (ref 0–0.3)
ABSOLUTE LYMPH #: 3.02 K/UL (ref 1.1–3.7)
ABSOLUTE MONO #: 0.69 K/UL (ref 0.1–1.2)
ANION GAP SERPL CALCULATED.3IONS-SCNC: 9 MMOL/L (ref 9–17)
BASOPHILS # BLD: 1 % (ref 0–2)
BASOPHILS ABSOLUTE: 0.07 K/UL (ref 0–0.2)
BUN BLDV-MCNC: 9 MG/DL (ref 6–20)
CALCIUM SERPL-MCNC: 9.1 MG/DL (ref 8.6–10.4)
CHLORIDE BLD-SCNC: 100 MMOL/L (ref 98–107)
CO2: 23 MMOL/L (ref 20–31)
CREAT SERPL-MCNC: 0.51 MG/DL (ref 0.5–0.9)
D-DIMER QUANTITATIVE: 0.58 MG/L FEU
EOSINOPHILS RELATIVE PERCENT: 3 % (ref 1–4)
FLU A ANTIGEN: NEGATIVE
FLU B ANTIGEN: NEGATIVE
GFR SERPL CREATININE-BSD FRML MDRD: >60 ML/MIN/1.73M2
GLUCOSE BLD-MCNC: 81 MG/DL (ref 70–99)
HCT VFR BLD CALC: 38.7 % (ref 36.3–47.1)
HEMOGLOBIN: 12.3 G/DL (ref 11.9–15.1)
IMMATURE GRANULOCYTES: 0 %
LYMPHOCYTES # BLD: 23 % (ref 24–43)
MAGNESIUM: 2 MG/DL (ref 1.6–2.6)
MCH RBC QN AUTO: 24.6 PG (ref 25.2–33.5)
MCHC RBC AUTO-ENTMCNC: 31.8 G/DL (ref 28.4–34.8)
MCV RBC AUTO: 77.6 FL (ref 82.6–102.9)
MONOCYTES # BLD: 5 % (ref 3–12)
NRBC AUTOMATED: 0 PER 100 WBC
PDW BLD-RTO: 15.5 % (ref 11.8–14.4)
PLATELET # BLD: 381 K/UL (ref 138–453)
PMV BLD AUTO: 10.8 FL (ref 8.1–13.5)
POTASSIUM SERPL-SCNC: 3.4 MMOL/L (ref 3.7–5.3)
RBC # BLD: 4.99 M/UL (ref 3.95–5.11)
RBC # BLD: ABNORMAL 10*6/UL
SARS-COV-2, RAPID: NOT DETECTED
SEG NEUTROPHILS: 68 % (ref 36–65)
SEGMENTED NEUTROPHILS ABSOLUTE COUNT: 8.74 K/UL (ref 1.5–8.1)
SODIUM BLD-SCNC: 132 MMOL/L (ref 135–144)
SPECIMEN DESCRIPTION: NORMAL
TROPONIN, HIGH SENSITIVITY: <6 NG/L (ref 0–14)
WBC # BLD: 12.9 K/UL (ref 3.5–11.3)

## 2022-11-19 PROCEDURE — 99285 EMERGENCY DEPT VISIT HI MDM: CPT

## 2022-11-19 PROCEDURE — 96374 THER/PROPH/DIAG INJ IV PUSH: CPT

## 2022-11-19 PROCEDURE — 93005 ELECTROCARDIOGRAM TRACING: CPT | Performed by: STUDENT IN AN ORGANIZED HEALTH CARE EDUCATION/TRAINING PROGRAM

## 2022-11-19 PROCEDURE — 71045 X-RAY EXAM CHEST 1 VIEW: CPT

## 2022-11-19 PROCEDURE — 83735 ASSAY OF MAGNESIUM: CPT

## 2022-11-19 PROCEDURE — 6370000000 HC RX 637 (ALT 250 FOR IP): Performed by: STUDENT IN AN ORGANIZED HEALTH CARE EDUCATION/TRAINING PROGRAM

## 2022-11-19 PROCEDURE — 85379 FIBRIN DEGRADATION QUANT: CPT

## 2022-11-19 PROCEDURE — 6360000002 HC RX W HCPCS: Performed by: STUDENT IN AN ORGANIZED HEALTH CARE EDUCATION/TRAINING PROGRAM

## 2022-11-19 PROCEDURE — 84484 ASSAY OF TROPONIN QUANT: CPT

## 2022-11-19 PROCEDURE — 87804 INFLUENZA ASSAY W/OPTIC: CPT

## 2022-11-19 PROCEDURE — 80048 BASIC METABOLIC PNL TOTAL CA: CPT

## 2022-11-19 PROCEDURE — 85025 COMPLETE CBC W/AUTO DIFF WBC: CPT

## 2022-11-19 PROCEDURE — 87635 SARS-COV-2 COVID-19 AMP PRB: CPT

## 2022-11-19 RX ORDER — BENZONATATE 100 MG/1
100 CAPSULE ORAL 3 TIMES DAILY PRN
Qty: 30 CAPSULE | Refills: 0 | Status: SHIPPED | OUTPATIENT
Start: 2022-11-19 | End: 2022-11-26

## 2022-11-19 RX ORDER — ONDANSETRON 4 MG/1
4 TABLET, FILM COATED ORAL EVERY 8 HOURS PRN
Qty: 20 TABLET | Refills: 0 | Status: SHIPPED | OUTPATIENT
Start: 2022-11-19

## 2022-11-19 RX ORDER — BENZONATATE 100 MG/1
100 CAPSULE ORAL ONCE
Status: COMPLETED | OUTPATIENT
Start: 2022-11-19 | End: 2022-11-19

## 2022-11-19 RX ORDER — KETOROLAC TROMETHAMINE 30 MG/ML
30 INJECTION, SOLUTION INTRAMUSCULAR; INTRAVENOUS ONCE
Status: COMPLETED | OUTPATIENT
Start: 2022-11-19 | End: 2022-11-19

## 2022-11-19 RX ADMIN — KETOROLAC TROMETHAMINE 30 MG: 30 INJECTION, SOLUTION INTRAMUSCULAR at 18:55

## 2022-11-19 RX ADMIN — BENZONATATE 100 MG: 100 CAPSULE ORAL at 18:55

## 2022-11-19 ASSESSMENT — PAIN DESCRIPTION - LOCATION: LOCATION: CHEST

## 2022-11-19 ASSESSMENT — ENCOUNTER SYMPTOMS
NAUSEA: 1
ABDOMINAL PAIN: 1
EYES NEGATIVE: 1
VOICE CHANGE: 0
RECTAL PAIN: 0
TROUBLE SWALLOWING: 0
COUGH: 1
COLOR CHANGE: 0
CHEST TIGHTNESS: 1
VOMITING: 1
BACK PAIN: 0
SHORTNESS OF BREATH: 1
APNEA: 0

## 2022-11-19 ASSESSMENT — PAIN DESCRIPTION - DESCRIPTORS: DESCRIPTORS: TIGHTNESS

## 2022-11-19 ASSESSMENT — PAIN - FUNCTIONAL ASSESSMENT: PAIN_FUNCTIONAL_ASSESSMENT: NONE - DENIES PAIN

## 2022-11-19 NOTE — ED NOTES
Labeled blood specimens sent to lab via tube system. [x] Lavender   [] on ice   [x] Blue   [x] Green/yellow  [] Green/black [] on ice  [] Pink  [x] Red  [] Yellow  [] Blood Cultures     Labeled COVID swab sent to lab via tube system.     [x] COVID-19 swab      [x] Rapid   [] Non- Rapid/PCR  [] Respiratory Panel with COVID    Labeled flu swab sent to lab via tube system       Matilde Macias RN  11/19/22 7876

## 2022-11-19 NOTE — ED TRIAGE NOTES
Patient presented to the ED today with complaints of SOB, congestion and nausea. Patient states symptoms presented last week.

## 2022-11-19 NOTE — ED NOTES
Pt arrived to ED alert and oriented x4 via triage. Pt c/o SOB, nasal congestion, and nausea. Pt reports that her daughter had a viral pneumonia and she believes that she got what her daughter had. Pt reports SOB that causes chest tightness but denies pain. Pt reports non-productive cough with nasal congestion. Pt reports nausea, denies abdominal pain, v/d/c. Pt denies having been around anyone suspected to have COVID-19 or anyone that has been sick, denies recent travel outside the [facility] or [address]. Pt placed on cardiac monitor, continuous pulse ox, and BP cuff. RR even and unlabored. NAD noted. Whiteboard updated. Will continue with plan of care.      Loulou Lyon RN  11/19/22 3145

## 2022-11-19 NOTE — Clinical Note
Lexx Li was seen and treated in our emergency department on 11/19/2022. She may return to work on 11/21/2022. If you have any questions or concerns, please don't hesitate to call.       Jaxon Pat MD

## 2022-11-19 NOTE — ED PROVIDER NOTES
[facility]     Emergency Department     Faculty Attestation        I performed a history and physical examination of the patient and discussed management with the resident. I reviewed the residents note and agree with the documented findings and plan of care. Any areas of disagreement are noted on the chart. I was personally present for the key portions of any procedures. I have documented in the chart those procedures where I was not present during the key portions. I have reviewed the emergency nurses triage note. I agree with the chief complaint, past medical history, past surgical history, allergies, medications, social and family history as documented unless otherwise noted below. For mid-level providers such as nurse practitioners as well as physicians assistants:    I have personally seen and evaluated the patient. I find the patient's history and physical exam are consistent with NP/PA documentation. I agree with the care provided, treatment rendered, disposition, & follow-up plan. Additional findings are as noted. Vital Signs: BP (!) 141/74   Pulse (!) 101   Temp 98.5 °F (36.9 °C) (Oral)   Resp 18   Ht 5' (1.524 m)   Wt 243 lb (110.2 kg)   SpO2 98%   BMI 47.46 kg/m²   PCP:  Karoline Silva MD    Pertinent Comments:     Complains of shortness of breath nasal congestion and some nausea. She has a dry nonproductive cough her son was sick with similar symptoms. She is likely tachycardic but otherwise afebrile nontoxic resting comfortably no acute distress.       Critical Care  None          Rich Adames MD    Attending Emergency Medicine Physician            Gee Connor MD  11/19/22 0018

## 2022-11-19 NOTE — ED PROVIDER NOTES
[address] [place] [address] ED  Emergency Department Encounter  Emergency Medicine Resident     Pt Name: Corina Alfredo  XKX:[id number]  [place] 1985  Date of evaluation: 22  PCP:  Jennifer Gray MD    [address] [place] [address]       Chief Complaint   Patient presents with    Shortness of Breath    Nausea    Nasal Congestion       HISTORY OF PRESENT ILLNESS  (Location/Symptom, Timing/Onset, Context/Setting, Quality, Duration, ModifyingFactors, Severity.)      Corina Alfredo is a 40 y.o. female presents to the emergency department for evaluation of ongoing shortness of breath, cough, nausea and vomiting and nasal congestion. Patient states that her son had very similar symptoms approximately 1 week ago and that her symptoms began shortly after taking care of him. Patient states that symptoms become so frequent and severe now that is causing her to have difficulties with her occupation as well as interfering with activities of daily life. Patient states that she does have an appetite, does not have a lack of smell or taste, states otherwise that she feels decently well other than her ongoing symptoms. Denies drug use, states that she does have a Mirena IUD implant. PAST MEDICAL / SURGICAL / SOCIAL /FAMILY HISTORY      has a past medical history of Asthma, Blood transfusion reaction, Chronic migraine without aura with status migrainosus, not intractable, Complication of anesthesia, History of blood transfusion, Microcytic anemia, and Sickle cell trait ([facility] [address] No other pertinent PMH on review with patient/guardian. has a past surgical history that includes  section. No other pertinent PSH on review with patient/guardian.   Social History     Socioeconomic History    Marital status:      Spouse name: Not on file    Number of children: Not on file    Years of education: Not on file    Highest education level: Not on file   Occupational History    Not on file   Tobacco Use    Smoking status: Never    Smokeless tobacco: Never   Vaping Use    Vaping Use: Never used   Substance and Sexual Activity    Alcohol use: No    Drug use: No    Sexual activity: Yes     Partners: Male   Other Topics Concern    Not on file   Social History Narrative    Not on file     Social Determinants of Health     Financial Resource Strain: Medium Risk    Difficulty of Paying Living Expenses: Somewhat hard   Food Insecurity: Food Insecurity Present    Worried About Running Out of Food in the Last Year: Sometimes true    Ran Out of Food in the Last Year: Sometimes true   Transportation Needs: Not on file   Physical Activity: Insufficiently Active    Days of Exercise per Week: 1 day    Minutes of Exercise per Session: 20 min   Stress: Not on file   Social Connections: Not on file   Intimate Partner Violence: Not At Risk    Fear of Current or Ex-Partner: No    Emotionally Abused: No    Physically Abused: No    Sexually Abused: No   Housing Stability: Not on file       I counseled the patient against using tobacco products. Family History   Problem Relation Age of Onset    Diabetes Father     Hypertension Father     Diabetes Mother     Hypertension Mother     Depression Mother     Mental Illness Mother     Other Sister         CP  complications     Heart Attack Brother     Cancer Maternal Aunt     Uterine Cancer Maternal Aunt     Breast Cancer Neg Hx     Colon Cancer Neg Hx     Eclampsia Neg Hx     Ovarian Cancer Neg Hx      Labor Neg Hx     Spont Abortions Neg Hx     Stroke Neg Hx      No other pertinent FamHx on review with patient/guardian. Allergies:  Patient has no known allergies. Home Medications:  Prior to Admission medications    Medication Sig Start Date End Date Taking?  Authorizing Provider   ondansetron (ZOFRAN) 4 MG tablet Take 1 tablet by mouth every 8 hours as needed for Nausea 22  Yes Nile Shetty MD   vitamin D (ERGOCALCIFEROL) 1.25 MG (69325 UT) CAPS capsule TAKE 1 CAPSULE BY MOUTH ONCE A WEEK FOR 6 DOSES 10/19/22 11/24/22  Sarahi Gong MD   albuterol sulfate HFA (PROVENTIL HFA) 108 (90 Base) MCG/ACT inhaler Inhale 2 puffs into the lungs every 6 hours as needed for Wheezing 9/12/22   Jordon Hannon MD   naproxen (NAPROSYN) 500 MG tablet Take 1 tablet by mouth 2 times daily (with meals) 9/12/22   Jordon Hannon MD   ibuprofen (ADVIL;MOTRIN) 600 MG tablet TAKE 1 TABLET BY MOUTH EVERY 6 HOURS AS NEEDED FOR PAIN 7/18/22   Shola Sow MD   levonorgestrel [facility]) IUD 52 mg 1 each by IntraUTERine route once for 1 dose 7/7/22 7/7/22  Shola Sow MD   acetaminophen (TYLENOL) 500 MG tablet Take 2 tablets by mouth every 6 hours as needed for Pain 2/2/22   Erica Almonte DO   medroxyPROGESTERone (DEPO-PROVERA) 150 MG/ML injection Inject 1 mL into the muscle every 3 months  Patient not taking: No sig reported 6/66/05   Jewel Romo DO       REVIEW OF SYSTEMS    (2-9 systems for level 4, 10 ormore for level 5)      Review of Systems   Constitutional:  Positive for activity change and appetite change. Negative for fatigue and fever. HENT:  Negative for congestion, tinnitus, trouble swallowing and voice change. Eyes: Negative. Respiratory:  Positive for cough, chest tightness and shortness of breath. Negative for apnea. Cardiovascular:  Negative for chest pain. Gastrointestinal:  Positive for abdominal pain, nausea and vomiting. Negative for rectal pain. Endocrine: Negative for cold intolerance and heat intolerance. Genitourinary:  Negative for dysuria and urgency. Musculoskeletal:  Positive for myalgias. Negative for arthralgias and back pain. Skin:  Negative for color change, pallor, rash and wound. Allergic/Immunologic: Negative for immunocompromised state. Neurological:  Negative for dizziness, facial asymmetry and headaches. Psychiatric/Behavioral:  Negative for agitation, behavioral problems and confusion.       PHYSICAL EXAM   (up to 7 for level 4, 8 or more for level 5)      INITIAL VITALS:   /84   Pulse 91   Temp 98.5 °F (36.9 °C) (Oral)   Resp 13   Ht 5' (1.524 m)   Wt 243 lb (110.2 kg)   SpO2 100%   BMI 47.46 kg/m²     Physical Exam  Vitals reviewed. Constitutional:       General: She is not in acute distress. Appearance: She is well-developed. She is ill-appearing. HENT:      Head: Normocephalic and atraumatic. Mouth/Throat:      Mouth: Mucous membranes are moist.      Pharynx: Oropharynx is clear. Eyes:      Extraocular Movements: Extraocular movements intact. Pupils: Pupils are equal, round, and reactive to light. Cardiovascular:      Rate and Rhythm: Regular rhythm. Tachycardia present. No extrasystoles are present. Heart sounds: No murmur heard. No gallop. Pulmonary:      Effort: Pulmonary effort is normal. No tachypnea, bradypnea or respiratory distress. Breath sounds: Examination of the right-upper field reveals rhonchi. Examination of the left-upper field reveals rhonchi. Examination of the right-lower field reveals rhonchi. Rhonchi present. No decreased breath sounds, wheezing or rales. Chest:      Chest wall: No mass, tenderness or edema. Abdominal:      General: Bowel sounds are normal.      Palpations: Abdomen is soft. Musculoskeletal:         General: Normal range of motion. Cervical back: Normal range of motion and neck supple. Skin:     General: Skin is warm and dry. Capillary Refill: Capillary refill takes less than 2 seconds. Neurological:      General: No focal deficit present. Mental Status: She is alert. She is disoriented.    Psychiatric:         Mood and Affect: Mood normal.         Behavior: Behavior normal.       DIFFERENTIAL  DIAGNOSIS     DDX: Viral illness, COVID-19, flu    PLAN (LABS / IMAGING / EKG):  Orders Placed This Encounter   Procedures    COVID-19, Rapid    RAPID INFLUENZA A/B ANTIGENS    XR CHEST PORTABLE    BMP    CBC with Auto Differential    Troponin Magnesium    D-Dimer, Quantitative    EKG 12 Lead       MEDICATIONS ORDERED:  Orders Placed This Encounter   Medications    ketorolac (TORADOL) injection 30 mg    benzonatate (TESSALON) capsule 100 mg    ondansetron (ZOFRAN) 4 MG tablet     Sig: Take 1 tablet by mouth every 8 hours as needed for Nausea     Dispense:  20 tablet     Refill:  0    benzonatate (TESSALON PERLES) 100 MG capsule     Sig: Take 1 capsule by mouth 3 times daily as needed for Cough     Dispense:  30 capsule     Refill:  0           DIAGNOSTIC RESULTS / EMERGENCY DEPARTMENT COURSE / MDM     LABS:  Results for orders placed or performed during the hospital encounter of 11/19/22   COVID-19, Rapid    Specimen: Nasopharyngeal Swab   Result Value Ref Range    Specimen Description . NASOPHARYNGEAL SWAB     SARS-CoV-2, Rapid Not Detected Not Detected   RAPID INFLUENZA A/B ANTIGENS    Specimen: Nasopharyngeal   Result Value Ref Range    Flu A Antigen NEGATIVE NEGATIVE    Flu B Antigen NEGATIVE NEGATIVE   BMP   Result Value Ref Range    Glucose 81 70 - 99 mg/dL    BUN 9 6 - 20 mg/dL    Creatinine 0.51 0.50 - 0.90 mg/dL    Est, Glom Filt Rate >60 >60 mL/min/1.73m2    Calcium 9.1 8.6 - 10.4 mg/dL    Sodium 132 (L) 135 - 144 mmol/L    Potassium 3.4 (L) 3.7 - 5.3 mmol/L    Chloride 100 98 - 107 mmol/L    CO2 23 20 - 31 mmol/L    Anion Gap 9 9 - 17 mmol/L   CBC with Auto Differential   Result Value Ref Range    WBC 12.9 (H) 3.5 - 11.3 k/uL    RBC 4.99 3.95 - 5.11 m/uL    Hemoglobin 12.3 11.9 - 15.1 g/dL    Hematocrit 38.7 36.3 - 47.1 %    MCV 77.6 (L) 82.6 - 102.9 fL    MCH 24.6 (L) 25.2 - 33.5 pg    MCHC 31.8 28.4 - 34.8 g/dL    RDW 15.5 (H) 11.8 - 14.4 %    Platelets 945 607 - 710 k/uL    MPV 10.8 8.1 - 13.5 fL    NRBC Automated 0.0 0.0 per 100 WBC    Seg Neutrophils 68 (H) 36 - 65 %    Lymphocytes 23 (L) 24 - 43 %    Monocytes 5 3 - 12 %    Eosinophils % 3 1 - 4 %    Basophils 1 0 - 2 %    Immature Granulocytes 0 0 %    Segs Absolute 8.74 (H) 1.50 - 8.10 k/uL    Absolute Lymph # 3.02 1.10 - 3.70 k/uL    Absolute Mono # 0.69 0.10 - 1.20 k/uL    Absolute Eos # 0.37 0.00 - 0.44 k/uL    Basophils Absolute 0.07 0.00 - 0.20 k/uL    Absolute Immature Granulocyte 0.05 0.00 - 0.30 k/uL    RBC Morphology ANISOCYTOSIS PRESENT    Troponin   Result Value Ref Range    Troponin, High Sensitivity <6 0 - 14 ng/L   Magnesium   Result Value Ref Range    Magnesium 2.0 1.6 - 2.6 mg/dL   D-Dimer, Quantitative   Result Value Ref Range    D-Dimer, Quant 0.58 mg/L FEU   EKG 12 Lead   Result Value Ref Range    Ventricular Rate 98 BPM    Atrial Rate 98 BPM    P-R Interval 138 ms    QRS Duration 76 ms    Q-T Interval 358 ms    QTc Calculation (Bazett) 457 ms    P Axis 63 degrees    R Axis 37 degrees    T Axis 33 degrees         IMPRESSION/MDM/ED COURSE:  40 y.o. female presented with viral-like syndrome of nausea, vomiting, shortness of breath, cough. Patient does have a IUD for contraception as well as patient is tachycardic, will get a D-dimer for evaluation of ensure the patient does not have a pulmonary embolism. Likely viral illness that she has been exposed to her child's viral illness, will give symptomatic treatment and reevaluate after labs and scans are returned. ED Course as of 11/28/22 0905   Sat Nov 19, 2022 2008 Patient's D-dimer 0.58. According to years criteria low risk. No PE suspected. Heart rate come down to the 90s after analgesia. Patient feeling significantly better. Will discharge with symptomatic relief. [ES]      ED Course User Index  [ES] Randy Torres MD        Patient/Guardian requesting discharge. Patient/Guardian was given written and verbal instructions prior to discharge. Patient/Guardian understood and agreed. Patient/Guardian had no further questions.        RADIOLOGY:  XR CHEST PORTABLE   Final Result      Lungs are clear               EKG  EKG Interpretation    Interpreted by me    Rhythm: normal sinus   Rate: normal  Axis: normal  Ectopy: none  Conduction: normal  ST Segments: no acute change  T Waves: no acute change  Q Waves: none    Clinical Impression: no acute changes and normal EKG    All EKG's are interpreted by the Emergency Department Physician who either signs or Co-signs this chart in the absence of a cardiologist.      PROCEDURES:  None    CONSULTS:  None        FINAL IMPRESSION      1.  Viral URI with cough          DISPOSITION / PLAN       DISPOSITION Decision To Discharge 11/19/2022 08:07:43 PM        PATIENT REFERREDTO:  Benjamin Zayas MD  [facility] [address] [address]  [phone]    Schedule an appointment as soon as possible for a visit       [facility] ED  [address] [id number]  [phone]  Go to   As needed    DISCHARGE MEDICATIONS:  Discharge Medication List as of 11/19/2022  8:09 PM        START taking these medications    Details   ondansetron (ZOFRAN) 4 MG tablet Take 1 tablet by mouth every 8 hours as needed for Nausea, Disp-20 tablet, R-0Print      benzonatate (TESSALON PERLES) 100 MG capsule Take 1 capsule by mouth 3 times daily as needed for Cough, Disp-30 capsule, R-0Print             Clair Granados MD  PGY 3  Resident Physician Emergency Medicine  11/28/22 9:05 AM        (Please note that portions of this note were completed with a voice recognition program.Efforts were made to edit the dictations but occasionally words are mis-transcribed.)        Clair Granados MD  Resident  11/19/22 [address], [place]  Resident  11/28/22 3147

## 2022-11-20 LAB
EKG ATRIAL RATE: 98 BPM
EKG P AXIS: 63 DEGREES
EKG P-R INTERVAL: 138 MS
EKG Q-T INTERVAL: 358 MS
EKG QRS DURATION: 76 MS
EKG QTC CALCULATION (BAZETT): 457 MS
EKG R AXIS: 37 DEGREES
EKG T AXIS: 33 DEGREES
EKG VENTRICULAR RATE: 98 BPM

## 2022-11-20 PROCEDURE — 93010 ELECTROCARDIOGRAM REPORT: CPT | Performed by: INTERNAL MEDICINE

## 2022-11-23 ENCOUNTER — HOSPITAL ENCOUNTER (OUTPATIENT)
Dept: PHYSICAL THERAPY | Age: 37
Setting detail: THERAPIES SERIES
Discharge: HOME | End: 2022-11-23
Payer: COMMERCIAL

## 2022-11-23 PROCEDURE — 97110 THERAPEUTIC EXERCISES: CPT

## 2022-11-23 NOTE — FLOWSHEET NOTE
[x] [address] [facility] &  Therapy  [address]  [phone]  F: [phone] [] [address]  [facility]   [address]  P: [phone]  F: [phone] [] [address]  [address]  P: [phone]  F: [phone] [] [address]  P: [phone]  F: [phone] [] [address]  [facility]   [address]   [place]: [phone]  F: [phone]      Physical Therapy Daily Treatment Note    Date:  2022  Patient Name:  Yolanda Reed    :  1985  MRN: [id number]  Physician: Radha Kitchen MD                                       Insurance: [employer] (limit 60 Rx), [employer] (Auth after 30 Rx)  Medical Diagnosis: Right hip pain M25.551                          Rehab Codes: M25.551, M79.651, M54.59, M25.561, M25.651, M25.661, M62.551,  M62.591 R26.89, R29.3  Onset date: 2022                     Next Dr's appt. :  tbd  Visit# / total visits:       Cancels/No Shows: 3/0    Subjective:    Pain:  [] Yes  [x] No  Location:  R thigh /LB          Pain Rating: (0-10 scale) 0/10 R anterior/lateral hip/groin  Pain altered Tx:  [] Yes  [x] No  Action:  Comments:    Repors no pain in past 7+ days with everyday activities. Children and herself have been ill therefore missed prior scheduled appts. Addressing HEP as able and plans on adding add'l core exercises as issued/educated to HEP/gym routines. Objective:  Modalities:  as needed    Precautions:  Exercises:2022 completed with X  Exercise   R thigh/hip/LB Reps/ Time Weight/ Level Comments 2022    X   SciFit  7 min  L4 L3  x          standing        Gastro stretch 3x20\"  wedge    Heel raises 20x         3 way hip  2x10 2 lbs Jonathan, incr.  Wt  Squats  2x10    TG L35 11/23 x   Tband UE-       Scap. retraction 1x10 blue      Palloff press 2x15 blue  Incr. reps 11/23 x   Palloff walk outs  10x ea blue Incr. Reps 11/23 x          Supine           MET  x 5sec  Gunshot tech 1 set, then R LE MET, HS activivation with correction. Also education on self correction w. Pt voicing understanding -10/25  10/28- equal did not need MET              SKTC 3x20       trA iso 5x5\"       trA iso w/ marches 2x10  2 lbs     Tra iso w/ alt UE 2x10  2 lbs     Tra iso alt same UE/LE 2x10 2  lbs     Dead bug 2x10      Bridges 10x 3\"      SLRs 10x 1 lbs     SLR w/ ER 10x 1 lbs      Hip abd 20x purple     Hip flexor stretch 3x30\"  Off side of mat    Groin stretch 1x20\"  Did not feel stretching, attempted in standing. Seated            HS stretch w/ stool 3x30\"  jonathan    SB rollout 10x5\"  Flexion       Jonathan LAQs w/ ball 15x 2 lbs Incr. Wt 10/25    Piriformis stertch 3x30\"  Sitting EOB,      Ankle circles HEP       Ankle tband HEP blue 4 way ankle      SB core    Red,      -PPT 10x   x   -PPT w/ alt UE 15x 1 lbs  x   -PPT w/ march 2x10 1lbs Incr. Sets 11/23 x   - PPT w/ knee ext  2x10   Incr. Sets 11/23 x   -ppt w/ alt opp ue/le 2x10 1 lbs Incr. Sets 11/23 x                 Prone          Prone lying 2 mins      Glute sets 15x5\"      Prone on elbows 10x      Press ups 10x      Jonathan HS curls 10x       Jonathan hip flexor stretch 3x30\"  Strap, blue roll      Hip ext  10x A                           Other: 11/23/22: held several exercises due to reassessment and pt voiced her goals of core work this date.     11/2/22:    LEFS 11% LOSS OF FUNCTION  11/23/22:  LEFS 21% Loss of function   R hip 110° AROM, MMT: R hip 4+/5,knee 5/5, ankle: 5/5, L hip,knee, ankle all 5/5       R L     Hip flex 11/2  4+/5 5/5     Quads  11/2 4+/5 4/5    HS 11/2 5/5 4+/5    DF 11/2 5/5 4+/5                                Treatment Charges: Mins Units   []  Modalities HP  --    [x]  Ther Exercise  35 2   [] Manual Therapy     []  Ther Activities     []  Aquatics     []  Vasocompression     []  Other     Total Treatment time 35 2       Assessment: [x] Progressing toward goals all LTG met, R thigh and LB pain resolved with no pain 7-14 days with all her daily activity. Reports she can continue with HEP and demonstrates and voiced good understanding. Many written exercises issued per pt request.     [] No change. [] Other:     [x] Patient would continue to benefit from skilled physical therapy services in order to:  decrease pain R hip, R thigh, R LB, increase ROM R hip, R knee, lumbar, increase strength R LE, L hip, spinal stab muscles, and improve tolerance to laying, activity and steps. STG: (to be met in 10 treatments)  ? Pain:R hip, thigh, LB, average 5/10, 7/10 at worst 10/28/22 MET 4-5 worst, 0/10 at times. ? ROM: R hip flex 100°, R knee, Lumbar AROM WNL without increase pain 10/28 MET, R knee 127° supine, foot on mat, Lumbar spine WNL tenisha  ? Strength: R hip 3+/5, R knee 4/5, DF 4/5, L hip 4-/5, L knee ext 4/5, spinal stab muscles as evidenced by ability to perform beginning  spinal stab ex program  ? Function: LEFS 54% loss of LE function 11/2/22:  Pt report improved tolerance to laying and getting up from something low 11/2/22; MET  Patient to be independent with home exercise program as demonstrated by performance with correct form without cues. 11/2/22; MET     LTG: (to be met in 20 treatments)  ? Pain:R hip, thigh, LB, average 3/10, 5/10 at worst 11/23/22: no pain last 7 days, MET  ? ROM: R hip flex 108° 11/23/22: MET, 110° flexion, active. ? Strength: R hip 4-/5, R knee 4+/5, DF 4+/5, L hip 4/5, L knee ext 4+/5, spinal stab muscles as evidenced by ability to perform moderate spinal stab ex program 11/23/22: MET all, see above  ?  Function: LEFS 40% loss of LE function  11/23/22: 21% Loss of function  Pt report improved tolerance to activity and steps 11/23/22:MET                    Patient goals: decrease pain, able to do things longer time     Pt. Education:  [x] Yes  [] No  [x] Reviewed Prior HEP/Ed  Method of Education: [x] Verbal  [x] Demo  [] Written  Comprehension of Education:  [x] Verbalizes understanding. [x] Demonstrates understanding. [] Needs review. [x] Demonstrates/verbalizes HEP/Ed previously given. HEP-Access Code: [email or web address]  URL: Backtrace I/O/  Supine Hip Adduction Isometric with Ball - 5 x daily - 7 x weekly - 10 reps - 5 seconds hold  Seated Hip Adduction Isometrics with Ball - 5 x daily - 7 x weekly - 10 reps - 5 sec hold  Prone Gluteal Sets - 5-10 x daily - 7 x weekly - 10 reps - 5 seconds hold    10/3/22: [email or web address] handouts (error that access code not copied over)  Hamstring stretch 2x daily 7x/wk/ 3x 30 seconds  Piriformis stertch 2x daily 7x/wk/ 3x 30 seconds, sitting. Hip flexor stretch 2x daily 7x/wk/ 3x 30 seconds, supine w/strap    Access Code: [email or web address]  URL: [employer] I/O/  Date: 10/07/2022  Exercises  Seated Ankle Dorsiflexion AROM - 1 x daily - 7 x weekly - 2 sets - 10 reps  Seated Ankle Circles - 1 x daily - 7 x weekly - 2 sets - 10 reps  Ankle Dorsiflexion with Resistance - 1 x daily - 5 x weekly - 2 sets - 10 reps  Ankle Eversion with Resistance - 1 x daily - 5 x weekly - 2 sets - 10 reps  Ankle Inversion with Resistance - 1 x daily - 5 x weekly - 2 sets - 10 reps  Ankle and Toe Plantarflexion with Resistance - 1 x daily - 5 x weekly - 2 sets - 10 reps  10/7/22: blue tband. Access Code: [email or web address]  URL: [employer]trace I/O/  Date: 10/12/2022  Exercises  Gastroc Stretch on Wall - 1 x daily - 4 x weekly - 3 sets - 10 reps  Standing Heel Raise with Support - 1 x daily - 4 x weekly - 3 sets - 10 reps  Standing Hip Abduction with Counter Support - 1 x daily - 4 x weekly - 3 sets - 10 reps  Standing Hip Extension with Counter Support - 1 x daily - 4 x weekly - 3 sets - 10 reps  Standing Hip Flexion AROM - 1 x daily - 4 x weekly - 3 sets - 10 reps  Access Code: [email or web address]  URL: Podio/  Date: 10/17/2022  Exercises  Supine Posterior Pelvic Tilt - 1 x daily - 7 x weekly - 2 sets - 10 reps  Supine March with Posterior Pelvic Tilt - 1 x daily - 7 x weekly - 2 sets - 10 reps  Supine Hip Adduction Isometric with Ball - 1 x daily - 7 x weekly - 2 sets - 10 reps  Dead Bug - 1 x daily - 7 x weekly - 2 sets - 10 reps  Access Code: [facility]  URL: Podio/  Date: 10/28/2022   Prone Hip Flexor Stretch on Table with Strap - 1 x daily - 7 x weekly - 1 sets - 3 reps - 30 hold  Access Code: [email or web address]  URL: Podio/   Prepared by: Lola Paredes  Exercises  Standing Lat Pull Down with Resistance - Elbows Bent - 1 x daily - 3 x weekly - 3 sets - 10 reps - 2 hold  Standing Row with Anchored Resistance - 1 x daily - 3 x weekly - 3 sets - 10 reps - 2 hold  Shoulder External Rotation and Scapular Retraction with Resistance - 1 x daily - 3 x weekly - 3 sets - 10 reps        Plan: [] Continue current frequency toward long and short term goals. [] Specific Instructions for subsequent treatments:    11/23/22: pt goals met, pain resolved and function increased. Pt to continue with HEP and follow with MD as needed. Will discharge from PT, if pt needs services in the future pleased issue new prescription.    Frequency:  2 x/week for 20 visits      Time In:   1002        Time Out:   1055    Electronically signed by:  Nneka Leal PTA

## 2022-12-05 NOTE — THERAPY DISCHARGE
[x] [facility] [facility]  [address]  [phone]  F: [phone]         Physical Therapy Discharge Note    Date: 2022      Patient: Lianne Borrego  : 1985  MRN: [id number]    Physician: Carlos Alvarado MD                                       Insurance: [employer] (limit 60 Rx), [employer] (Auth after 30 Rx)  Medical Diagnosis: Right hip pain M25.551                          Rehab Codes: M25.551, M79.651, M54.59, M25.561, M25.651, M25.661, M62.551,  M62.591 R26.89, R29.3  Onset date: 2022                     Next Dr's appt. :  tbd    Total visits attended: 6  Cancels/No shows: 3/0  Date of initial visit: 2022                Date of final visit: 2022      Subjective:  Pain:  [] Yes  [x] No  Location:  R thigh /LB          Pain Rating: (0-10 scale) 0/10 R anterior/lateral hip/groin  Pain altered Tx:  [] Yes  [x] No  Action:  Comments: At final Rx Pt reports no pain in past 7+ days with everyday activities. Comments: Pain 4-5/10 worst, 0/10 at times. Objective:  Test Measurements: R hip 110° AROM, R knee 127°, Lumbar spine WNL;   MMT: R hip 4+/5,knee 5/5, ankle: 5/5, L hip,knee, ankle all 5/5   Function: LEFS 21% Loss of function    Assessment:  STG:(to be met in 10 treatments)  ? Pain:R hip, thigh, LB, average 5/10, 7/10 at worst- MET   ? ROM: R hip flex 100°, R knee, Lumbar AROM WNL without increase pain MET  ? Strength: R hip 3+/5, R knee 4/5, DF 4/5, L hip 4-/5, L knee ext 4/5, spinal stab muscles as evidenced by ability to perform beginning  spinal stab ex program- MET   ? Function: LEFS 54% loss of LE function  MET   Pt report improved tolerance to laying and getting up from something low - MET  Patient to be independent with home exercise program as demonstrated by performance with correct form without cues. MET     LTG:(to be met in 20 treatments)  ?  Pain:R hip, thigh, LB, average 3/10, 5/10 at worst- MET  ? ROM: R hip flex 108° -MET  ? Strength: R hip 4-/5, R knee 4+/5, DF 4+/5, L hip 4/5, L knee ext 4+/5, spinal stab muscles as evidenced by ability to perform moderate spinal stab ex program MET   ? Function: LEFS 40% loss of LE function MET   Pt report improved tolerance to activity and steps-MET    Treatment to Date:  [x] Therapeutic Exercise    [] Modalities:  [] Therapeutic Activity    [] Ultrasound  [] Electrical Stimulation  [] Gait Training     [] Massage       [] Lumbar/Cervical Traction  [] Neuromuscular Re-education [x] Cold/hotpack [] Iontophoresis: 4 mg/mL  [x] Instruction in Home Exercise Program                     Dexamethasone Sodium  [] Manual Therapy             Phosphate 40-80 mAmin  [] Aquatic Therapy                   [] Vasocompression/    [] Other:             Game Ready    Discharge Status:     [x] Pt recovered from conditions. Treatment goals were met. [x] Pt received maximum benefit. No further therapy indicated at this time. [x] Pt to continue exercise/home instructions independently. [] Therapy interrupted due to:    [] Pt has 2 or more no shows/cancels, is discontinued per our policy. [] Pt has completed prescribed number of treatment sessions. [] Other:         Electronically signed by Erlin Kent PT on 12/5/2022 at 1:50 PM        If you have any questions or concerns, please don't hesitate to call.   Thank you for your referral.

## 2023-05-12 ENCOUNTER — OFFICE VISIT (OUTPATIENT)
Dept: FAMILY MEDICINE CLINIC | Age: 38
End: 2023-05-12

## 2023-05-12 VITALS
HEART RATE: 95 BPM | BODY MASS INDEX: 44.41 KG/M2 | DIASTOLIC BLOOD PRESSURE: 69 MMHG | HEIGHT: 60 IN | SYSTOLIC BLOOD PRESSURE: 102 MMHG | WEIGHT: 226.2 LBS

## 2023-05-12 DIAGNOSIS — J30.9 ALLERGIC RHINITIS WITH POSTNASAL DRIP: Primary | ICD-10-CM

## 2023-05-12 DIAGNOSIS — M25.561 RIGHT KNEE PAIN, UNSPECIFIED CHRONICITY: ICD-10-CM

## 2023-05-12 DIAGNOSIS — R06.83 SNORES: ICD-10-CM

## 2023-05-12 DIAGNOSIS — R09.82 ALLERGIC RHINITIS WITH POSTNASAL DRIP: Primary | ICD-10-CM

## 2023-05-12 RX ORDER — CETIRIZINE HYDROCHLORIDE 10 MG/1
5 TABLET ORAL DAILY
Qty: 30 TABLET | Refills: 0 | Status: SHIPPED | OUTPATIENT
Start: 2023-05-12 | End: 2023-06-11

## 2023-05-12 RX ORDER — FLUTICASONE PROPIONATE 50 MCG
2 SPRAY, SUSPENSION (ML) NASAL DAILY
Qty: 16 G | Refills: 0 | Status: SHIPPED | OUTPATIENT
Start: 2023-05-12

## 2023-05-12 SDOH — ECONOMIC STABILITY: FOOD INSECURITY: WITHIN THE PAST 12 MONTHS, THE FOOD YOU BOUGHT JUST DIDN'T LAST AND YOU DIDN'T HAVE MONEY TO GET MORE.: NEVER TRUE

## 2023-05-12 SDOH — ECONOMIC STABILITY: FOOD INSECURITY: WITHIN THE PAST 12 MONTHS, YOU WORRIED THAT YOUR FOOD WOULD RUN OUT BEFORE YOU GOT MONEY TO BUY MORE.: NEVER TRUE

## 2023-05-12 SDOH — ECONOMIC STABILITY: TRANSPORTATION INSECURITY
IN THE PAST 12 MONTHS, HAS LACK OF TRANSPORTATION KEPT YOU FROM MEETINGS, WORK, OR FROM GETTING THINGS NEEDED FOR DAILY LIVING?: NO

## 2023-05-12 SDOH — ECONOMIC STABILITY: INCOME INSECURITY: HOW HARD IS IT FOR YOU TO PAY FOR THE VERY BASICS LIKE FOOD, HOUSING, MEDICAL CARE, AND HEATING?: SOMEWHAT HARD

## 2023-05-12 SDOH — ECONOMIC STABILITY: HOUSING INSECURITY
IN THE LAST 12 MONTHS, WAS THERE A TIME WHEN YOU DID NOT HAVE A STEADY PLACE TO SLEEP OR SLEPT IN A SHELTER (INCLUDING NOW)?: NO

## 2023-05-12 ASSESSMENT — PATIENT HEALTH QUESTIONNAIRE - PHQ9
SUM OF ALL RESPONSES TO PHQ QUESTIONS 1-9: 0
SUM OF ALL RESPONSES TO PHQ9 QUESTIONS 1 & 2: 0
SUM OF ALL RESPONSES TO PHQ QUESTIONS 1-9: 0
1. LITTLE INTEREST OR PLEASURE IN DOING THINGS: 0
2. FEELING DOWN, DEPRESSED OR HOPELESS: 0

## 2023-05-12 NOTE — PROGRESS NOTES
Visit Information    Have you changed or started any medications since your last visit including any over-the-counter medicines, vitamins, or herbal medicines? no   Have you stopped taking any of your medications? Is so, why? -  no  Are you having any side effects from any of your medications? - no    Have you seen any other physician or provider since your last visit? yes - PT,    Have you had any other diagnostic tests since your last visit? yes - Labs, XR, EKG   Have you been seen in the emergency room and/or had an admission in a hospital since we last saw you?  yes - Luke Wheeler   Have you had your routine dental cleaning in the past 6 months?  no     Do you have an active MyChart account? If no, what is the barrier?   Yes    Patient Care Team:  Natalie Cowart MD as PCP - General (Family Medicine)  Leon iSmmons MD as Obstetrician (Perinatology)  Dante Crowder DO as Resident (Obstetrics & Gynecology)    Medical History Review  Past Medical, Family, and Social History reviewed and does not contribute to the patient presenting condition    Health Maintenance   Topic Date Due    COVID-19 Vaccine (1) Never done    Pneumococcal 0-64 years Vaccine (1 - PCV) Never done    Varicella vaccine (2 of 2 - 13+ 2-dose series) 01/09/2002    Cervical cancer screen  07/23/2023    Flu vaccine (Season Ended) 08/01/2023    Depression Screen  09/26/2023    DTaP/Tdap/Td vaccine (3 - Td or Tdap) 10/29/2028    Hepatitis C screen  Completed    HIV screen  Completed    Hepatitis A vaccine  Aged Out    Hib vaccine  Aged Out    Meningococcal (ACWY) vaccine  Aged Out    Hepatitis B vaccine  Discontinued    Depression Monitoring  Discontinued    Diabetes screen  Discontinued

## 2023-05-12 NOTE — PROGRESS NOTES
Follow up    Snoring worsening, apnea, morning headache  Sleeping on the side did not help  Postnasal drip   Right Knee pain, previous hx ofdislocated Knee    Negative for:     Worry / mood complaints  Dizziness  Visual Disturbance  Hearing Changes  Mouth / tooth symptom, pain  Throat pain  Difficulty swallowing  Neck pain  Chest discomfort  Cough  SOB  N/V/D/C  Pelvic area discomfort  Bladder / voiding discomfort  Bowel complaints  Numbness/tingling/abnormal sensations   Edema / Leg swelling  Dizziness  Fatigue  Bleeding   Skin    Pertinent Pos: See HPI -     Vitals:    05/12/23 1121   BP: 102/69   Pulse: 95       Alert and oriented to PPT  NAD    HEENT - neg  Neck - no bruits, no lymphadenopathy  Chest  HRRR w/o murmer  LCTAB no wheezes / rhonchi  Abdomen - soft, non-tender, BS  Extremities - no leg swelling    Gait / Station - stable, no dysequilibrium, uniform pace, no assist device, cane. Diagnosis Orders   1. Allergic rhinitis with postnasal drip        2. Snores  Baseline Diagnostic Sleep Study      3.  Right knee pain, unspecified chronicity  XR KNEE RIGHT (1-2 VIEWS)          Plan:  1.)  Will prescribe Zyrtec and Flonase  2.)  Sleep study to r/o ANDREEA   3.)  Xray of the Rt knee

## 2023-05-12 NOTE — PROGRESS NOTES
Attending Physician Statement  I have discussed the care of [facility], including pertinent history and exam findings,  with the resident. I have reviewed the key elements of all parts of the encounter with the resident. I agree with the assessment, plan and orders as documented by the resident.   (Angelina Donis)    Chidi Dye MD

## 2023-05-12 NOTE — PATIENT INSTRUCTIONS
Thank you for letting us take care of you today. We hope all your questions were addressed. If a question was overlooked or something else comes to mind after you return home, please contact a member of your Care Team listed below. Your Care Team at [place] [address] is Team #4  Jose Angel Post MD (Faculty)  Anneliese Pompa MD (Resident)  Andreia Sommer MD (Resident)  Mikaela Hanson MD (Resident)  Juany Chinchilla MD (Resident)  CHARLES Lakhani., GINA  [facility] Joe., Edward Barlow., [facility] office)  Nereyda Frost, [address] (Clinical Practice Manager)  Porfirio Farooq [facility] (Clinical Pharmacist)       Office phone number: [phone]    If you need to get in right away due to illness, please be advised we have \"Same Day\" appointments available Monday-Friday. Please call us at [phone] option #3 to schedule your \"Same Day\" appointment.

## 2023-05-17 ENCOUNTER — HOSPITAL ENCOUNTER (OUTPATIENT)
Age: 38
Discharge: HOME | End: 2023-05-19
Payer: COMMERCIAL

## 2023-05-17 ENCOUNTER — HOSPITAL ENCOUNTER (OUTPATIENT)
Dept: GENERAL RADIOLOGY | Age: 38
Discharge: HOME | End: 2023-05-19
Payer: COMMERCIAL

## 2023-05-17 DIAGNOSIS — M25.561 RIGHT KNEE PAIN, UNSPECIFIED CHRONICITY: ICD-10-CM

## 2023-05-17 PROCEDURE — 73560 X-RAY EXAM OF KNEE 1 OR 2: CPT

## 2023-05-19 ENCOUNTER — TELEPHONE (OUTPATIENT)
Dept: INTERNAL MEDICINE CLINIC | Age: 38
End: 2023-05-19

## 2023-05-19 DIAGNOSIS — M25.561 RIGHT KNEE PAIN, UNSPECIFIED CHRONICITY: Primary | ICD-10-CM

## 2023-06-19 ENCOUNTER — HOSPITAL ENCOUNTER (OUTPATIENT)
Dept: PHYSICAL THERAPY | Age: 38
Setting detail: THERAPIES SERIES
Discharge: HOME | End: 2023-06-19
Payer: COMMERCIAL

## 2023-06-19 PROCEDURE — 97110 THERAPEUTIC EXERCISES: CPT

## 2023-06-19 NOTE — FLOWSHEET NOTE
treatments)  ? Pain: Decrease right knee pain to 5/10  ? ROM: Increase right knee flexion to 90°  ? Strength: Increase right quads, hamstrings and right hip to 4+/5  ? Function:Improve LEFS to 54% functional impairment  Patient to be independent with home exercise program as demonstrated by performance with correct form without cues. LTG: (to be met in 12 treatments)  Increase right knee flexion to 100°  Increase right quads, hamstrings and right hip to 5/5  Pt will be able to walk a short distance with little to no difficulty  Pt will be able to go up and down stairs with moderate difficulty  Improve LEFS to 44% functional impairment                    Patient goals: To finally get rid of pain in right knee permanently. Pt. Education:  [x] Yes  [] No  [] Reviewed Prior HEP/Ed  Method of Education: [x] Verbal  [x] Demo  [x] Written  Comprehension of Education:  [x] Verbalizes understanding. [x] Demonstrates understanding. [x] Needs review. [] Demonstrates/verbalizes HEP/Ed previously given. Access Code: [email or web address]  URL: [email or web address]  Date: 06/19/2023  Prepared by: Kwan Marcano    Exercises  - Standing Hip Abduction with Counter Support  - 1 x daily - 3 x weekly - 2 sets - 10 reps  - Standing Hip Extension with Counter Support  - 1 x daily - 3 x weekly - 2 sets - 10 reps  - Standing Hip Flexion AROM  - 1 x daily - 3 x weekly - 2 sets - 10 reps  - Supine Straight Leg Hip Adduction and Quad Set with Ball  - 1 x daily - 3 x weekly - 2 sets - 10 reps  - Active Straight Leg Raise with Quad Set  - 1 x daily - 3 x weekly - 2 sets - 10 reps  - Straight Leg Raise with External Rotation  - 1 x daily - 3 x weekly - 2 sets - 10 reps  - Supine Bridge  - 1 x daily - 3 x weekly - 2 sets - 10 reps - 3 hold  - Sidelying Hip Abduction  - 1 x daily - 3 x weekly - 2 sets - 10 reps  - Sidelying Hip Adduction  - 1 x daily - 3 x weekly - 2 sets - 10 reps  - Prone Hip Extension  - 1 x daily - 3 x weekly - 2 sets - 10

## 2023-06-22 ENCOUNTER — HOSPITAL ENCOUNTER (OUTPATIENT)
Dept: PHYSICAL THERAPY | Age: 38
Setting detail: THERAPIES SERIES
Discharge: HOME | End: 2023-06-22
Payer: COMMERCIAL

## 2023-06-22 ENCOUNTER — TELEPHONE (OUTPATIENT)
Dept: FAMILY MEDICINE CLINIC | Age: 38
End: 2023-06-22

## 2023-06-22 PROCEDURE — 97110 THERAPEUTIC EXERCISES: CPT

## 2023-06-22 NOTE — FLOWSHEET NOTE
[x] [facility]  [address]  [phone]  F: [phone] [] [address]  [address]  P: [phone]  F: [phone] [] [address]  [address] [facility]  P: [phone]  F: [phone] [] [address]: [phone]  F: [phone] [] [address]  [facility]   [address]   P: [phone]  F: [phone]  [] 200 [facility].   P: [phone]  F: [phone] [] [address]  [facility].   [address]  P: [phone]  F: [phone] [] [address]  [address]  [place]: [phone]  F: [phone] [] [facility] [address]  [place]: [phone]  F: [phone]      Physical Therapy Daily Treatment Note    Date:  2023  Patient Name:  Buck Mckeon    :  1985  MRN: [id number]   Physician: Adrianna Woodruff MD                          Insurance: [facility] (60 visit limit)  Medical Diagnosis: Right knee pain, unspecified chronicity (M25.561 [ICD-10-CM])                      Rehab Codes: M25.561, M25.661, M62.81  Onset Date: 23   Next Dr. Joaquin Thurman:  TBD  Visit# / total visits: ; Progress note for  STG reassessment due at visit # 8     Cancels/No Shows: 0/0    Subjective:    Pain:  [x] Yes  [] No Location:  R knee Pain Rating: (0-10 scale) 3/10 inferior/lateral patella  Pain altered Tx:  [x] No  [] Yes  Action:  Comments: Arrives stating minimally increased pain numeric in R knee compared to

## 2023-06-27 ENCOUNTER — HOSPITAL ENCOUNTER (OUTPATIENT)
Dept: PHYSICAL THERAPY | Age: 38
Setting detail: THERAPIES SERIES
Discharge: HOME | End: 2023-06-27
Payer: COMMERCIAL

## 2023-06-27 PROCEDURE — 97110 THERAPEUTIC EXERCISES: CPT

## 2023-06-30 ENCOUNTER — HOSPITAL ENCOUNTER (OUTPATIENT)
Dept: PHYSICAL THERAPY | Age: 38
Setting detail: THERAPIES SERIES
Discharge: HOME | End: 2023-06-30
Payer: COMMERCIAL

## 2023-06-30 PROCEDURE — 97110 THERAPEUTIC EXERCISES: CPT

## 2023-07-05 ENCOUNTER — HOSPITAL ENCOUNTER (OUTPATIENT)
Dept: PHYSICAL THERAPY | Age: 38
Setting detail: THERAPIES SERIES
Discharge: HOME | End: 2023-07-05
Payer: COMMERCIAL

## 2023-07-05 PROCEDURE — 97110 THERAPEUTIC EXERCISES: CPT

## 2023-07-05 NOTE — FLOWSHEET NOTE
[x] [address]  4600 [facility].  [phone]  F: [phone] [] 204 [facility]  [address]   [address]  P: [phone]  F: [phone] [] [address]  151 [facility]  P: [phone]  F: [phone] [] [place]: [phone]  F: [phone] [] [address] [facility]  One [facility]   [address]   P: [phone]  F: [phone]  [] 7[address] [facility].   P: [phone]  F: [phone] [] 205 [facility]  [address] [facility]   [address]  P: [phone]  F: [phone] [] 224 [facility]  [address]  [place]: [phone]  F: [phone] [] [address] [facility] [address]  [place]: [phone]  F: [phone]      Physical Therapy Daily Treatment Note    Date:  2023  Patient Name:  Anuja Yeboah    :  1985  MRN: [id number]   Physician: Faith Eaton MD                          Insurance: [address] [employer] [facility] (60 visit limit)  Medical Diagnosis: Right knee pain, unspecified chronicity (M25.561 [ICD-10-CM])                      Rehab Codes: M25.561, M25.661, M62.81  Onset Date: 23   Next Dr. Radu Jj:  TBD  Visit# / total visits: ; Progress note for  STG reassessment due at visit # 8     Cancels/No Shows: 0/0    Subjective:    Pain:  [x] Yes  [] No Location:  R knee Pain Rating: (0-10 scale) 0/10 inferior/lateral patella  Pain altered Tx:  [x] No  [] Yes  Action:  Comments:     No reports of knee pain today and over weekend, addressing all daily

## 2023-07-07 ENCOUNTER — APPOINTMENT (OUTPATIENT)
Dept: PHYSICAL THERAPY | Age: 38
End: 2023-07-07
Payer: COMMERCIAL

## 2023-07-10 ENCOUNTER — HOSPITAL ENCOUNTER (OUTPATIENT)
Dept: PHYSICAL THERAPY | Age: 38
Setting detail: THERAPIES SERIES
Discharge: HOME | End: 2023-07-10
Payer: COMMERCIAL

## 2023-07-10 ENCOUNTER — TELEPHONE (OUTPATIENT)
Dept: FAMILY MEDICINE CLINIC | Age: 38
End: 2023-07-10

## 2023-07-10 PROCEDURE — 97110 THERAPEUTIC EXERCISES: CPT

## 2023-07-10 NOTE — TELEPHONE ENCOUNTER
----- Message from Romy Iglesias sent at 7/10/2023  1:35 PM EDT -----  Subject: Results Request    QUESTIONS  Results: sleep study;  Ordered by:   Date Performed: 2023-06-11  ---------------------------------------------------------------------------  --------------  Vickie Marker INFO    [phone]; OK to leave message on voicemail  ---------------------------------------------------------------------------  --------------

## 2023-07-10 NOTE — FLOWSHEET NOTE
[x] [address]  4600 [facility].  [phone]  F: [phone] [] 204 [facility]  [address]  P: [phone]  F: [phone] [] [address]  151 [facility]  P: [phone]  F: [phone] [] [place]: [phone]  F: [phone] [] [address]  One [facility]   [address]   P: [phone]  F: [phone]  [] 7[address] [facility].   P: [phone]  F: [phone] [] 205 [facility]  [address] [facility] [address]  P: [phone]  F: [phone] [] 224 [facility]  [address]  [place]: [phone]  F: [phone] [] [address]  [place]: [phone]  F: [phone]      Physical Therapy Daily Treatment Note    Date:  7/10/2023  Patient Name:  Michael Field    :  1985  MRN: [id number]   Physician: Jessica Galeano MD                          Insurance: [facility] (60 visit limit)  Medical Diagnosis: Right knee pain, unspecified chronicity (M25.561 [ICD-10-CM])                      Rehab Codes: M25.561, M25.661, M62.81  Onset Date: 23   Next Dr. Capri James:  TBD  Visit# / total visits: ; Progress note for  STG reassessment due at visit # 8     Cancels/No Shows: 0/0    Subjective:    Pain:  [x] Yes  [] No Location:  R knee Pain Rating: (0-10 scale) 1-2/10 inferior/lateral patella  Pain altered Tx:  [x] No  [] Yes  Action:  Comments:      Reports pain below knee cap.  Also states she will have to end PT

## 2023-07-13 ENCOUNTER — HOSPITAL ENCOUNTER (OUTPATIENT)
Dept: PHYSICAL THERAPY | Age: 38
Setting detail: THERAPIES SERIES
Discharge: HOME | End: 2023-07-13
Payer: COMMERCIAL

## 2023-07-13 PROCEDURE — 97110 THERAPEUTIC EXERCISES: CPT

## 2023-07-13 NOTE — FLOWSHEET NOTE
[x] [address]  4600 [facility].  [phone]  F: [phone] [] 204 [facility]  [address] [facility] [address]   [address]  P: [phone]  F: [phone] [] [address]  151 [facility]  P: [phone]  F: [phone] [] [facility] [place]: [phone]  F: [phone] [] [address] [facility]  One [facility]   [address]   P: [phone]  F: [phone]  [] 7174 [facility].   P: [phone]  F: [phone] [] 205 [facility]  2000 [facility]   [address]  P: [phone]  F: [phone] [] 224 [facility]  [address]  [place]: [phone]  F: [phone] [] [address]  [place]: [phone]  F: [phone]      Physical Therapy Daily Treatment Note    Date:  2023  Patient Name:  Tanika Viveros    :  1985  MRN: [id number]   Physician: Todd Dinero MD                          Insurance: [facility] (60 visit limit)  Medical Diagnosis: Right knee pain, unspecified chronicity (M25.561 [ICD-10-CM])                      Rehab Codes: M25.561, M25.661, M62.81  Onset Date: 23   Next Dr. Amanda Rargaldino:  TBD  Visit# / total visits: ; Progress note for  STG reassessment due at visit # 8     Cancels/No Shows: 0/0    Subjective:    Pain:  [x] Yes  [] No Location:  R knee Pain Rating: (0-10 scale) 0/10 inferior/lateral patella  Pain altered Tx:  [x] No  [] Yes  Action:  Comments:   Reports minimal to no pain at arrival. States felt good with

## 2023-07-17 ENCOUNTER — HOSPITAL ENCOUNTER (OUTPATIENT)
Dept: PHYSICAL THERAPY | Age: 38
Setting detail: THERAPIES SERIES
Discharge: HOME | End: 2023-07-17
Payer: COMMERCIAL

## 2023-07-17 PROCEDURE — 97110 THERAPEUTIC EXERCISES: CPT

## 2023-07-17 NOTE — FLOWSHEET NOTE
[x] [address]  4600 [facility].  [phone]  F: [phone] [] 204 [facility]  [address] [facility]   [address]  P: [phone]  F: [phone] [] [address]  151 [facility]  P: [phone]  F: [phone] [] [place]: [phone]  F: [phone] [] [address] [facility]  One [facility]   [address]   P: [phone]  F: [phone]  [] 5[address] [facility].   P: [phone]  F: [phone] [] 205 [facility]  [address] [facility] [address]  P: [phone]  F: [phone] [] 224 [facility]  [address]  [place]: [phone]  F: [phone] [] [address] [place] [facility] [address]  [place]: [phone]  F: [phone]      Physical Therapy Daily Treatment Note    Date:  2023  Patient Name:  Chichi Moulton    :  1985  MRN: [id number]   Physician: Maximiliano Cisneros MD                          Insurance: [address] (60 visit limit)  Medical Diagnosis: Right knee pain, unspecified chronicity (M25.561 [ICD-10-CM])                      Rehab Codes: M25.561, M25.661, M62.81  Onset Date: 23   Next Dr. Effie Hill:  TBD  Visit# / total visits: ; Progress note for  STG reassessment due at visit # 8     Cancels/No Shows: 0/0    Subjective:    Pain:  [] Yes  [x] No Location:  R knee Pain Rating: (0-10 scale) 0/10 inferior/lateral patella  Pain altered Tx:  [x] No  [] Yes  Action:  Comments:    No pain complaints with arrival and over weekend.   Pt questioning how

## 2023-07-19 ENCOUNTER — HOSPITAL ENCOUNTER (OUTPATIENT)
Dept: PHYSICAL THERAPY | Age: 38
Setting detail: THERAPIES SERIES
Discharge: HOME | End: 2023-07-19
Payer: COMMERCIAL

## 2023-07-19 PROCEDURE — 97110 THERAPEUTIC EXERCISES: CPT

## 2023-07-19 PROCEDURE — 97530 THERAPEUTIC ACTIVITIES: CPT

## 2023-07-19 NOTE — FLOWSHEET NOTE
[x] [address]  4600 [facility].  [phone]  F: [phone] [] 204 [facility]  [address] [facility] [address]   [address]  P: [phone]  F: [phone] [] [address]  151 [facility]  P: [phone]  F: [phone] [] [place]: [phone]  F: [phone] [] [address]  One [facility]   [address]   P: [phone]  F: [phone]  [] 3150 [facility].   P: [phone]  F: [phone] [] 205 [facility]  [address] [facility]   [address]  P: [phone]  F: [phone] [] 224 [facility]  [address]  [place]: [phone]  F: [phone] [] [address] [place] [facility] [address]  [place]: [phone]  F: [phone]      Physical Therapy Daily Treatment Note    Date:  2023  Patient Name:  Lyly Gonzalez    :  1985  MRN: [id number]   Physician: Amara Carroll MD                          Insurance: [facility] (60 visit limit)  Medical Diagnosis: Right knee pain, unspecified chronicity (M25.561 [ICD-10-CM])                      Rehab Codes: M25.561, M25.661, M62.81  Onset Date: 23   Next Dr. Susanne Velasquez:  TBD  Visit# / total visits: 10/12; Progress note for  STG reassessment due at visit # 8     Cancels/No Shows: 0/0    Subjective:    Pain:  [] Yes  [x] No Location:  R knee Pain Rating: (0-10 scale) 0/10 inferior/lateral patella  Pain altered Tx:  [x] No  [] Yes  Action:  Comments:      Reports she is generally tired and has increased HS soreness but

## 2023-07-24 ENCOUNTER — HOSPITAL ENCOUNTER (OUTPATIENT)
Dept: PHYSICAL THERAPY | Age: 38
Setting detail: THERAPIES SERIES
Discharge: HOME | End: 2023-07-24
Payer: COMMERCIAL

## 2023-07-24 ENCOUNTER — TELEMEDICINE (OUTPATIENT)
Dept: FAMILY MEDICINE CLINIC | Age: 38
End: 2023-07-24
Payer: COMMERCIAL

## 2023-07-24 DIAGNOSIS — G47.33 OSA (OBSTRUCTIVE SLEEP APNEA): Primary | ICD-10-CM

## 2023-07-24 PROCEDURE — 98968 PH1 ASSMT&MGMT NQHP 21-30: CPT

## 2023-07-24 PROCEDURE — 97110 THERAPEUTIC EXERCISES: CPT

## 2023-07-24 NOTE — PROGRESS NOTES
Ori Schwartz is a 40 y.o. female evaluated via telephone on 7/24/2023 for No chief complaint on file. .        Documentation:  I communicated with the patient and/or health care decision maker about       This is a video visit encounter with the patient discussed the results of recent sleep study. The result that shows obstructive sleep apnea. Discussed recommendations with the patient of weight loss, avoidance of alcohol sedative medication, sleep on the side. Patient to have a follow-up titration test for further arrangement with CPAP machine to be started. Total Time: minutes: 21-30 minutes    Ori Schwartz was evaluated through a synchronous (real-time) audio encounter. Patient identification was verified at the start of the visit. She (or guardian if applicable) is aware that this is a billable service, which includes applicable co-pays. This visit was conducted with the patient's (and/or legal guardian's) verbal consent. She has not had a related appointment within my department in the past 7 days or scheduled within the next 24 hours. The patient was located at Home: [address] [place] [address]. The provider was located at [facility] (Appt Dept): [address] [facility],  [address].     Note: not billable if this call serves to triage the patient into an appointment for the relevant concern    Mckenzie Powell MD

## 2023-07-24 NOTE — PROGRESS NOTES
Attending Physician Statement  I have discussed the care of DelishaScottincluding pertinent history and exam findings,  with the resident. I have reviewed the key elements of all parts of the encounter with the resident. I agree with the assessment, plan and orders as documented by the resident.   (GE Modifier)    VV  ANDREEA- ordered titration test

## 2023-07-26 ENCOUNTER — HOSPITAL ENCOUNTER (OUTPATIENT)
Dept: PHYSICAL THERAPY | Age: 38
Setting detail: THERAPIES SERIES
Discharge: HOME | End: 2023-07-26
Payer: COMMERCIAL

## 2023-07-26 PROCEDURE — 97110 THERAPEUTIC EXERCISES: CPT

## 2023-07-26 NOTE — FLOWSHEET NOTE
[x] [address]  4600 [facility] [place] [facility].  [phone]  F: [phone] [] 204 [facility]  [address] [facility] [address]   [address]  P: [phone]  F: [phone] [] [address]  151 [facility]  P: [phone]  F: [phone] [] [place]: [phone]  F: [phone] [] 224 [facility]  One [facility]   [address]   P: [phone]  F: [phone]  [] 9[address] [facility].   P: [phone]  F: [phone] [] 205 [facility]  [address] [facility] [address]  P: [phone]  F: [phone] [] 224 [facility]  [address]  [place]: [phone]  F: [phone] [] [address] [place] [facility] [address]  [place]: [phone]  F: [phone]      Physical Therapy Daily Treatment Note    Date:  2023  Patient Name:  Ehsan King    :  1985  MRN: [id number]   Physician: Alem Dukes MD                          Insurance: [facility] (60 visit limit)  Medical Diagnosis: Right knee pain, unspecified chronicity (M25.561 [ICD-10-CM])                      Rehab Codes: M25.561, M25.661, M62.81  Onset Date: 23   Next Dr. Yrn Melvin:  TBD  Visit# / total visits: ; Progress note for  STG reassessment due at visit # 8     Cancels/No Shows: 0/0    Subjective:    Pain:  [] Yes  [x] No Location:  R knee Pain Rating: (0-10 scale) 0/10 inferior/lateral patella  Pain altered Tx:  [x] No  [] Yes  Action:  Comments: Continues to do well, no knee pain.   Feels she has progressed 80-90% since

## 2023-08-26 NOTE — TELEPHONE ENCOUNTER
I have discussed the results of the first part of sleeping study with the patient in the last virtual visit, patient stated that she is esablishing with another provider

## 2023-12-12 DIAGNOSIS — J45.20 MILD INTERMITTENT ASTHMA WITHOUT COMPLICATION: ICD-10-CM

## 2023-12-13 RX ORDER — ALBUTEROL SULFATE 90 UG/1
2 AEROSOL, METERED RESPIRATORY (INHALATION) EVERY 6 HOURS PRN
Qty: 18 EACH | Refills: 3 | Status: SHIPPED | OUTPATIENT
Start: 2023-12-13

## 2023-12-13 NOTE — TELEPHONE ENCOUNTER
Last visit: 23  Last Med refill: 22  Does patient have enough medication for 72 hours: No:     Next Visit Date:  No future appointments.     Health Maintenance   Topic Date Due    COVID-19 Vaccine (1) Never done    Pneumococcal 0-64 years Vaccine (1 - PCV) Never done    Hepatitis B vaccine (2 of 3 - 3-dose series) 2002    Varicella vaccine (2 of 2 - 13+ 2-dose series) 2002    Cervical cancer screen  2023    Flu vaccine (1) Never done    Depression Screen  2024    DTaP/Tdap/Td vaccine (3 - Td or Tdap) 10/29/2028    Hepatitis C screen  Completed    HIV screen  Completed    Hepatitis A vaccine  Aged Out    Hib vaccine  Aged Out    HPV vaccine  Aged Out    Meningococcal (ACWY) vaccine  Aged Out    Depression Monitoring  Discontinued    Diabetes screen  Discontinued       Hemoglobin A1C (%)   Date Value   2022 5.5             ( goal A1C is < 7)   No components found for: \"LABMICR\"  No results found for: \"LDLCHOLESTEROL\", \"LDLCALC\"    (goal LDL is <100)   AST (U/L)   Date Value   2018 11     ALT (U/L)   Date Value   2018 7     BUN (mg/dL)   Date Value   2022 9     BP Readings from Last 3 Encounters:   23 102/69   22 133/84   22 (!) 141/101          (goal 120/80)    All Future Testing planned in [facility]  Lab Frequency Next Occurrence   Sleep Study with PAP Titration Once 2023               Patient Active Problem List:     Asthma     BMI 47.6     History of  (G8)     Sickle cell trait      History of      Hx of SAB x4     History of blood transfusion     H/O Indicated PTD     Chronic migraine without aura with status migrainosus, not intractable     Increased insulin level     Insomnia     Memory impairment      18 M Apg 8/9 Wt 6#10      Abnormal uterine bleeding (AUB)     Dysmenorrhea     Painful lumpy breasts     Mirena IUD insertion 22     Vitamin D deficiency     Right hip pain     Allergic rhinitis with postnasal

## 2024-08-01 ENCOUNTER — TELEPHONE (OUTPATIENT)
Dept: FAMILY MEDICINE CLINIC | Age: 39
End: 2024-08-01

## 2024-09-03 ENCOUNTER — HOSPITAL ENCOUNTER (OUTPATIENT)
Age: 39
Setting detail: SPECIMEN
Discharge: HOME | End: 2024-09-03

## 2024-09-03 ENCOUNTER — OFFICE VISIT (OUTPATIENT)
Dept: FAMILY MEDICINE CLINIC | Age: 39
End: 2024-09-03
Payer: MEDICAID

## 2024-09-03 VITALS
TEMPERATURE: 98.4 F | HEART RATE: 92 BPM | SYSTOLIC BLOOD PRESSURE: 108 MMHG | BODY MASS INDEX: 46.22 KG/M2 | HEIGHT: 60 IN | WEIGHT: 235.4 LBS | DIASTOLIC BLOOD PRESSURE: 72 MMHG | OXYGEN SATURATION: 100 %

## 2024-09-03 DIAGNOSIS — R11.2 NAUSEA AND VOMITING, UNSPECIFIED VOMITING TYPE: ICD-10-CM

## 2024-09-03 DIAGNOSIS — M25.561 RIGHT KNEE PAIN, UNSPECIFIED CHRONICITY: ICD-10-CM

## 2024-09-03 DIAGNOSIS — J45.20 MILD INTERMITTENT ASTHMA WITHOUT COMPLICATION: ICD-10-CM

## 2024-09-03 DIAGNOSIS — G47.33 OSA (OBSTRUCTIVE SLEEP APNEA): ICD-10-CM

## 2024-09-03 DIAGNOSIS — R11.2 NAUSEA AND VOMITING, UNSPECIFIED VOMITING TYPE: Primary | ICD-10-CM

## 2024-09-03 LAB
EST. AVERAGE GLUCOSE BLD GHB EST-MCNC: 103 MG/DL
HBA1C MFR BLD: 5.2 % (ref 4–6)

## 2024-09-03 PROCEDURE — 99211 OFF/OP EST MAY X REQ PHY/QHP: CPT | Performed by: STUDENT IN AN ORGANIZED HEALTH CARE EDUCATION/TRAINING PROGRAM

## 2024-09-03 PROCEDURE — 99214 OFFICE O/P EST MOD 30 MIN: CPT

## 2024-09-03 RX ORDER — ONDANSETRON 4 MG/1
4 TABLET, FILM COATED ORAL EVERY 8 HOURS PRN
Qty: 20 TABLET | Refills: 0 | Status: SHIPPED | OUTPATIENT
Start: 2024-09-03

## 2024-09-03 RX ORDER — NAPROXEN 500 MG/1
500 TABLET ORAL 2 TIMES DAILY WITH MEALS
Qty: 60 TABLET | Refills: 0 | Status: SHIPPED | OUTPATIENT
Start: 2024-09-03

## 2024-09-03 RX ORDER — FLUTICASONE PROPIONATE 50 MCG
2 SPRAY, SUSPENSION (ML) NASAL DAILY
Qty: 16 G | Refills: 0 | Status: SHIPPED | OUTPATIENT
Start: 2024-09-03

## 2024-09-03 RX ORDER — ACETAMINOPHEN 500 MG
1000 TABLET ORAL EVERY 6 HOURS PRN
Qty: 20 TABLET | Refills: 1 | Status: SHIPPED | OUTPATIENT
Start: 2024-09-03

## 2024-09-03 RX ORDER — ALBUTEROL SULFATE 90 UG/1
2 AEROSOL, METERED RESPIRATORY (INHALATION) EVERY 6 HOURS PRN
Qty: 18 EACH | Refills: 3 | Status: SHIPPED | OUTPATIENT
Start: 2024-09-03

## 2024-09-03 RX ORDER — OMEPRAZOLE 40 MG/1
40 CAPSULE, DELAYED RELEASE ORAL
Qty: 90 CAPSULE | Refills: 1 | Status: SHIPPED | OUTPATIENT
Start: 2024-09-03

## 2024-09-03 SDOH — ECONOMIC STABILITY: INCOME INSECURITY: HOW HARD IS IT FOR YOU TO PAY FOR THE VERY BASICS LIKE FOOD, HOUSING, MEDICAL CARE, AND HEATING?: SOMEWHAT HARD

## 2024-09-03 SDOH — ECONOMIC STABILITY: FOOD INSECURITY: WITHIN THE PAST 12 MONTHS, YOU WORRIED THAT YOUR FOOD WOULD RUN OUT BEFORE YOU GOT MONEY TO BUY MORE.: OFTEN TRUE

## 2024-09-03 SDOH — ECONOMIC STABILITY: FOOD INSECURITY: WITHIN THE PAST 12 MONTHS, THE FOOD YOU BOUGHT JUST DIDN'T LAST AND YOU DIDN'T HAVE MONEY TO GET MORE.: OFTEN TRUE

## 2024-09-03 ASSESSMENT — ENCOUNTER SYMPTOMS
DIARRHEA: 1
ABDOMINAL PAIN: 1
BLOOD IN STOOL: 0
NAUSEA: 1
CONSTIPATION: 0
SHORTNESS OF BREATH: 0
ABDOMINAL DISTENTION: 0
VOMITING: 1
BACK PAIN: 1

## 2024-09-03 ASSESSMENT — PATIENT HEALTH QUESTIONNAIRE - PHQ9
SUM OF ALL RESPONSES TO PHQ9 QUESTIONS 1 & 2: 0
2. FEELING DOWN, DEPRESSED OR HOPELESS: NOT AT ALL
SUM OF ALL RESPONSES TO PHQ QUESTIONS 1-9: 0
1. LITTLE INTEREST OR PLEASURE IN DOING THINGS: NOT AT ALL

## 2024-09-03 NOTE — PATIENT INSTRUCTIONS
offer: Food pantry: Monday-Friday 9:30-11am, Hot meal: Monday-Thursday 12-1:30pm  Phone Number: [phone]; [address] [facility] [address]  [facility] Opportunity Program (OP) [place], [place]:  What they offer: Food Pantry (Appointment Only, [facility] residents)  Phone Number: [phone]  [place] [employer] (ODF):  What they offer: Government programs including Medicaid, SNAP (food stamps), TANF (cash assistance), and childcare assistance.  Phone Number: [phone]      [facility]  What they offer: Food pantry, Monday-Thursday 9am-12pm, [facility] residents with picture ID  Phone and Address: [phone]; [address] [facility] [address] [facility] [facility] [address]    [facility]  What they offer: Food pantry delivery within [place] boundaries  Phone number: [phone]  [facility] 2-1-1  What they offer: Free referral service available by phone to anyone in [place], [place], or [facility]. Additional food resources and help for any health or human need.  Phone and Address: 2-1-1 or 0-173-874-ANUD (5264)     [place] [employer] [facility] [employer] [place] ([facility])  What they offer: Hot meal first, third, and fourth Saturday 6pm - 8pm  Phone Number: [phone]  [facility]ging, District [facility]:   [place], [place], [place], [place], [place], [place], [place], [place], [facility]:  What they offer: Referral to transportation and other resources for seniors.  Phone Number: [phone]   [employer]  What they offer: After School Meals Program providing meals to children.  Phone Number: [phone]  [facility]:  What they offer: Food pantry for [facility] residents  Phone Number: [phone]  [facility]:  What they offer: Food pantry for [facility] Residents  Phone Number: [phone]  [facility]  Phone number: [phone]              [facility] Food Bank and [facility]  Phone number:

## 2024-09-03 NOTE — PROGRESS NOTES
Attending Physician Statement  I  have discussed the care of Moe Esquivel including pertinent history and exam findings with the resident. I agree with the assessment, plan and orders as documented by the resident.      /72 (Site: Left Upper Arm, Position: Sitting, Cuff Size: Large Adult)   Pulse 92   Temp 98.4 °F (36.9 °C) (Oral)   Ht 1.524 m (5')   Wt 106.8 kg (235 lb 6.4 oz)   SpO2 100%   Breastfeeding No   BMI 45.97 kg/m²    BP Readings from Last 3 Encounters:   09/03/24 108/72   05/12/23 102/69   11/19/22 133/84     Wt Readings from Last 3 Encounters:   09/03/24 106.8 kg (235 lb 6.4 oz)   06/12/23 102.5 kg (226 lb)   05/12/23 102.6 kg (226 lb 3.2 oz)          Diagnosis Orders   1. Nausea and vomiting, unspecified vomiting type  ondansetron (ZOFRAN) 4 MG tablet    US ABDOMEN COMPLETE    omeprazole (PRILOSEC) 40 MG delayed release capsule    Hemoglobin A1C      2. Mild intermittent asthma without complication  albuterol sulfate HFA (VENTOLIN HFA) 108 (90 Base) MCG/ACT inhaler    fluticasone (FLONASE) 50 MCG/ACT nasal spray      3. Body mass index (BMI) 45.0-49.9, adult (HCC)  Sleep Study with PAP Titration    Hemoglobin A1C      4. ANDREEA (obstructive sleep apnea)  Sleep Study with PAP Titration      5. Right knee pain, unspecified chronicity  naproxen (NAPROSYN) 500 MG tablet    acetaminophen (TYLENOL) 500 MG tablet    [facility]              Michelle Christopher DO 9/4/2024 1:39 PM      
Visit Information    Have you changed or started any medications since your last visit including any over-the-counter medicines, vitamins, or herbal medicines? no   Have you stopped taking any of your medications? Is so, why? -  yes see list   Are you having any side effects from any of your medications? - no    Have you seen any other physician or provider since your last visit?  no   Have you had any other diagnostic tests since your last visit?  no   Have you been seen in the emergency room and/or had an admission in a hospital since we last saw you?  no   Have you had your routine dental cleaning in the past 6 months?  no     Do you have an active MyChart account? If no, what is the barrier?  Yes    Patient Care Team:  Gisselle Evasn MD as PCP - General (Family Medicine)  Adi Andrew MD as Obstetrician (Perinatology)  Caroline Corbett DO as Resident (Obstetrics & Gynecology)    Medical History Review  Past Medical, Family, and Social History reviewed and does not contribute to the patient presenting condition    Health Maintenance   Topic Date Due    Pneumococcal 0-64 years Vaccine (1 of 2 - PCV) Never done    Hepatitis B vaccine (2 of 3 - 3-dose series) 01/09/2002    Varicella vaccine (2 of 2 - 13+ 2-dose series) 01/09/2002    Cervical cancer screen  07/23/2023    Depression Screen  05/12/2024    Flu vaccine (1) Never done    COVID-19 Vaccine (1 - 2023-24 season) Never done    DTaP/Tdap/Td vaccine (3 - Td or Tdap) 10/29/2028    Hepatitis C screen  Completed    HIV screen  Completed    Hepatitis A vaccine  Aged Out    Hib vaccine  Aged Out    HPV vaccine  Aged Out    Polio vaccine  Aged Out    Meningococcal (ACWY) vaccine  Aged Out    Depression Monitoring  Discontinued    Diabetes screen  Discontinued             
or other pathology.     Put in orders for refills today.      Review of Systems   Constitutional:  Positive for appetite change. Negative for chills and fever.   Respiratory:  Negative for shortness of breath.    Cardiovascular:  Negative for chest pain.   Gastrointestinal:  Positive for abdominal pain, diarrhea, nausea and vomiting. Negative for abdominal distention, blood in stool and constipation.   Musculoskeletal:  Positive for back pain and joint swelling (knee). Negative for arthralgias, myalgias, neck pain and neck stiffness.           The patient has a   Family History   Problem Relation Age of Onset    Diabetes Father     Hypertension Father     Diabetes Mother     Hypertension Mother     Depression Mother     Mental Illness Mother     Other Sister         CP  complications     Heart Attack Brother     Cancer Maternal Aunt     Uterine Cancer Maternal Aunt     Breast Cancer Neg Hx     Colon Cancer Neg Hx     Eclampsia Neg Hx     Ovarian Cancer Neg Hx      Labor Neg Hx     Spont Abortions Neg Hx     Stroke Neg Hx        Objective:    /72 (Site: Left Upper Arm, Position: Sitting, Cuff Size: Large Adult)   Pulse 92   Temp 98.4 °F (36.9 °C) (Oral)   Ht 1.524 m (5')   Wt 106.8 kg (235 lb 6.4 oz)   SpO2 100%   Breastfeeding No   BMI 45.97 kg/m²    BP Readings from Last 3 Encounters:   24 108/72   23 102/69   22 133/84       Physical Exam  Constitutional:       Appearance: Normal appearance. She is obese.   Cardiovascular:      Rate and Rhythm: Normal rate and regular rhythm.      Pulses: Normal pulses.      Heart sounds: Normal heart sounds. No murmur heard.     No friction rub. No gallop.   Pulmonary:      Effort: Pulmonary effort is normal. No respiratory distress.      Breath sounds: Normal breath sounds. No wheezing or rales.   Abdominal:      General: Abdomen is flat. There is no distension.      Palpations: Abdomen is soft.      Tenderness: There is no abdominal

## 2024-09-10 DIAGNOSIS — R06.83 SNORES: Primary | ICD-10-CM

## 2024-11-09 ENCOUNTER — HOSPITAL ENCOUNTER (OUTPATIENT)
Dept: SLEEP CENTER | Age: 39
Discharge: HOME | End: 2024-11-11
Payer: COMMERCIAL

## 2024-11-09 VITALS
OXYGEN SATURATION: 94 % | BODY MASS INDEX: 42.33 KG/M2 | WEIGHT: 230 LBS | HEART RATE: 93 BPM | HEIGHT: 62 IN | RESPIRATION RATE: 18 BRPM

## 2024-11-09 DIAGNOSIS — G47.33 OSA (OBSTRUCTIVE SLEEP APNEA): ICD-10-CM

## 2024-11-09 DIAGNOSIS — R06.83 SNORES: ICD-10-CM

## 2024-11-09 PROCEDURE — 95811 POLYSOM 6/>YRS CPAP 4/> PARM: CPT

## 2024-11-09 ASSESSMENT — SLEEP AND FATIGUE QUESTIONNAIRES
HOW LIKELY ARE YOU TO NOD OFF OR FALL ASLEEP WHILE SITTING QUIETLY AFTER LUNCH WITHOUT ALCOHOL: WOULD NEVER DOZE
HOW LIKELY ARE YOU TO NOD OFF OR FALL ASLEEP WHILE SITTING AND READING: SLIGHT CHANCE OF DOZING
HOW LIKELY ARE YOU TO NOD OFF OR FALL ASLEEP WHILE SITTING INACTIVE IN A PUBLIC PLACE: WOULD NEVER DOZE
HOW LIKELY ARE YOU TO NOD OFF OR FALL ASLEEP WHILE LYING DOWN TO REST IN THE AFTERNOON WHEN CIRCUMSTANCES PERMIT: MODERATE CHANCE OF DOZING
HOW LIKELY ARE YOU TO NOD OFF OR FALL ASLEEP WHILE WATCHING TV: SLIGHT CHANCE OF DOZING
ESS TOTAL SCORE: 5
HOW LIKELY ARE YOU TO NOD OFF OR FALL ASLEEP WHILE SITTING AND TALKING TO SOMEONE: SLIGHT CHANCE OF DOZING
HOW LIKELY ARE YOU TO NOD OFF OR FALL ASLEEP WHEN YOU ARE A PASSENGER IN A CAR FOR AN HOUR WITHOUT A BREAK: WOULD NEVER DOZE
HOW LIKELY ARE YOU TO NOD OFF OR FALL ASLEEP IN A CAR, WHILE STOPPED FOR A FEW MINUTES IN TRAFFIC: WOULD NEVER DOZE

## 2024-11-12 LAB — STATUS: NORMAL

## 2024-11-19 ENCOUNTER — TELEPHONE (OUTPATIENT)
Dept: FAMILY MEDICINE CLINIC | Age: 39
End: 2024-11-19

## 2024-11-19 DIAGNOSIS — G47.33 OSA (OBSTRUCTIVE SLEEP APNEA): Primary | ICD-10-CM

## 2024-11-19 NOTE — TELEPHONE ENCOUNTER
Called the patient to inform her of her sleep study results. She has mild ANDREEA. I have put in the order for CPAP. Patient agreeable to use it.

## 2024-12-09 DIAGNOSIS — R11.2 NAUSEA AND VOMITING, UNSPECIFIED VOMITING TYPE: ICD-10-CM

## 2024-12-10 RX ORDER — OMEPRAZOLE 40 MG/1
40 CAPSULE, DELAYED RELEASE ORAL
Qty: 90 CAPSULE | Refills: 3 | Status: SHIPPED | OUTPATIENT
Start: 2024-12-10

## 2025-02-18 DIAGNOSIS — M25.561 RIGHT KNEE PAIN, UNSPECIFIED CHRONICITY: ICD-10-CM

## 2025-02-19 NOTE — TELEPHONE ENCOUNTER
Last visit: 9.3.24   Last Med refill: 9.3.24  Does patient have enough medication for 72 hours: Yes    Next Visit Date:  No future appointments.    Health Maintenance   Topic Date Due    Hepatitis B vaccine (2 of 3 - 3-dose series) 2002    Varicella vaccine (2 of 2 - 13+ 2-dose series) 2002    Pneumococcal 0-49 years Vaccine (1 of 2 - PCV) Never done    Cervical cancer screen  2023    Flu vaccine (1) Never done    COVID-19 Vaccine ( -  season) Never done    Depression Screen  2025    DTaP/Tdap/Td vaccine (3 - Td or Tdap) 10/29/2028    Hepatitis C screen  Completed    HIV screen  Completed    Hepatitis A vaccine  Aged Out    Hib vaccine  Aged Out    HPV vaccine  Aged Out    Polio vaccine  Aged Out    Meningococcal (ACWY) vaccine  Aged Out    Depression Monitoring  Discontinued    Diabetes screen  Discontinued       Hemoglobin A1C (%)   Date Value   2024 5.2   2022 5.5             ( goal A1C is < 7)   No components found for: \"LABMICR\"  No components found for: \"LDLCHOLESTEROL\", \"LDLCALC\"    (goal LDL is <100)   AST (U/L)   Date Value   2018 11     ALT (U/L)   Date Value   2018 7     BUN (mg/dL)   Date Value   2022 9     BP Readings from Last 3 Encounters:   24 108/72   23 102/69   22 133/84          (goal 120/80)    All Future Testing planned in [facility]  Lab Frequency Next Occurrence   US ABDOMEN COMPLETE Once 2024               Patient Active Problem List:     Asthma     BMI 47.6     History of  (G8)     Sickle cell trait      History of      Hx of SAB x4     History of blood transfusion     H/O Indicated PTD     Chronic migraine without aura with status migrainosus, not intractable     Increased insulin level     Insomnia     Memory impairment      18 M Apg 8/9 Wt 6#10      Abnormal uterine bleeding (AUB)     Dysmenorrhea     Painful lumpy breasts     Mirena IUD insertion 22     Vitamin D deficiency

## 2025-02-20 RX ORDER — PSEUDOEPHED/ACETAMINOPH/DIPHEN 30MG-500MG
2 TABLET ORAL EVERY 6 HOURS PRN
Qty: 20 TABLET | Refills: 1 | Status: SHIPPED | OUTPATIENT
Start: 2025-02-20

## 2025-03-31 ENCOUNTER — TELEPHONE (OUTPATIENT)
Dept: FAMILY MEDICINE CLINIC | Age: 40
End: 2025-03-31

## 2025-03-31 NOTE — TELEPHONE ENCOUNTER
Patient called the office stating she was having difficulty breathing, writer offered patient an appointment as soon as tomorrow, patient stated she would not be able to come in until Friday. Patient was scheduled Friday and also informed patient if anything got worse to go to [facility]. Patient voiced her understanding.

## 2025-03-31 NOTE — TELEPHONE ENCOUNTER
Patient has sent a message stating that she is having difficulties breathing, so writer called the patient to make her an acute care for today no answer writer left message to go to nearest emergency room if her breathing get worse.

## 2025-04-04 ENCOUNTER — OFFICE VISIT (OUTPATIENT)
Dept: FAMILY MEDICINE CLINIC | Age: 40
End: 2025-04-04
Payer: COMMERCIAL

## 2025-04-04 VITALS
OXYGEN SATURATION: 97 % | SYSTOLIC BLOOD PRESSURE: 119 MMHG | WEIGHT: 237.2 LBS | DIASTOLIC BLOOD PRESSURE: 85 MMHG | BODY MASS INDEX: 43.65 KG/M2 | HEART RATE: 92 BPM | HEIGHT: 62 IN

## 2025-04-04 DIAGNOSIS — J45.40 MODERATE PERSISTENT ASTHMA WITHOUT COMPLICATION: Primary | ICD-10-CM

## 2025-04-04 DIAGNOSIS — R06.02 SHORTNESS OF BREATH: ICD-10-CM

## 2025-04-04 PROCEDURE — 99213 OFFICE O/P EST LOW 20 MIN: CPT

## 2025-04-04 RX ORDER — ALBUTEROL SULFATE 0.83 MG/ML
2.5 SOLUTION RESPIRATORY (INHALATION) 4 TIMES DAILY PRN
Qty: 120 EACH | Refills: 3 | Status: SHIPPED | OUTPATIENT
Start: 2025-04-04

## 2025-04-04 RX ORDER — PREDNISONE 20 MG/1
40 TABLET ORAL DAILY
Qty: 10 TABLET | Refills: 0 | Status: SHIPPED | OUTPATIENT
Start: 2025-04-04 | End: 2025-04-09

## 2025-04-04 RX ORDER — BUDESONIDE AND FORMOTEROL FUMARATE DIHYDRATE 160; 4.5 UG/1; UG/1
2 AEROSOL RESPIRATORY (INHALATION) 2 TIMES DAILY
Qty: 10.2 G | Refills: 3 | Status: SHIPPED | OUTPATIENT
Start: 2025-04-04

## 2025-04-04 SDOH — ECONOMIC STABILITY: FOOD INSECURITY: WITHIN THE PAST 12 MONTHS, THE FOOD YOU BOUGHT JUST DIDN'T LAST AND YOU DIDN'T HAVE MONEY TO GET MORE.: NEVER TRUE

## 2025-04-04 SDOH — ECONOMIC STABILITY: FOOD INSECURITY: WITHIN THE PAST 12 MONTHS, YOU WORRIED THAT YOUR FOOD WOULD RUN OUT BEFORE YOU GOT MONEY TO BUY MORE.: NEVER TRUE

## 2025-04-04 ASSESSMENT — PATIENT HEALTH QUESTIONNAIRE - PHQ9
SUM OF ALL RESPONSES TO PHQ QUESTIONS 1-9: 0
1. LITTLE INTEREST OR PLEASURE IN DOING THINGS: NOT AT ALL
2. FEELING DOWN, DEPRESSED OR HOPELESS: NOT AT ALL

## 2025-04-04 NOTE — PATIENT INSTRUCTIONS
Thank you for letting us take care of you today. We hope all your questions were addressed. If a question was overlooked or something else comes to mind after you return home, please contact a member of your Care Team listed below.        Your Care Team at [facility] is Team #4  Michelle Christopher M.D. (Faculty)  Cristian Craig M.D. (Resident)  Catherine Steinberg M.D.  (Resident)  Adelita Tate M.D. (Resident)  Gisselle Evans M.D. (Resident)  Solomon Aldrich, ROXANA [place], [facility]  La Nena Hennessy, [facility]  Anisa Scott, [facility]  Elise Mitchell, [facility]  Octavia Casillas, [facility]  Leilani Mathews (LJ) CHARLES Garcia (Clinical Practice Manager)  Kiya Iniguez [facility] (Clinical Pharmacist)       Office phone number: [phone]    If you need to get in right away due to illness, please be advised we have \"Same Day\" appointments available Monday-Friday. Please call us at [phone] option #3 to schedule your \"Same Day\" appointment.

## 2025-04-04 NOTE — PROGRESS NOTES
Attending Physician Statement  I  have discussed the care of Moe Esquivel including pertinent history and exam findings with the resident. I agree with the assessment, plan and orders as documented by the resident.      /85 (BP Site: Left Lower Arm, Patient Position: Sitting, BP Cuff Size: Large Adult)   Pulse 92   Ht 1.575 m (5' 2\")   Wt 107.6 kg (237 lb 3.2 oz)   SpO2 97%   BMI 43.38 kg/m²    BP Readings from Last 3 Encounters:   04/22/25 96/62   04/04/25 119/85   09/03/24 108/72     Wt Readings from Last 3 Encounters:   04/22/25 106.8 kg (235 lb 6.4 oz)   04/04/25 107.6 kg (237 lb 3.2 oz)   11/09/24 104.3 kg (230 lb)          Diagnosis Orders   1. Moderate persistent asthma without complication  albuterol (PROVENTIL) (2.5 MG/3ML) 0.083% nebulizer solution    budesonide-formoterol (SYMBICORT) 160-4.5 MCG/ACT AERO    predniSONE (DELTASONE) 20 MG tablet    DME Order for (Specify) as OP      2. Shortness of breath  DME Order for (Specify) as OP          Moderate persistent asthma. Worsening SOB w/o respiratory distress.. using inhaler move frequently. O2 sats wnl. Exam shows wheezing. Start symbicort prn and assess at next visit. Prednisone burst 5 days. Albuterol prn.     Michelle Christopher DO 5/15/2025 1:59 PM      
Visit Information    Have you changed or started any medications since your last visit including any over-the-counter medicines, vitamins, or herbal medicines? no   Have you stopped taking any of your medications? Is so, why? -  no  Are you having any side effects from any of your medications? - no    Have you seen any other physician or provider since your last visit?  no   Have you had any other diagnostic tests since your last visit?  no   Have you been seen in the emergency room and/or had an admission in a hospital since we last saw you?  no   Have you had your routine dental cleaning in the past 6 months?  no     Do you have an active MyChart account? If no, what is the barrier?  Yes    Patient Care Team:  Gisselle Evans MD as PCP - General (Family Medicine)  Gabriela Doran MD as PCP - Empaneled Provider  Adi Andrew MD as Obstetrician (Perinatology)  Caroline Corbett DO as Resident (Obstetrics & Gynecology)    Medical History Review  Past Medical, Family, and Social History reviewed and does not contribute to the patient presenting condition    Health Maintenance   Topic Date Due    Hepatitis B vaccine (2 of 3 - 3-dose series) 01/09/2002    Varicella vaccine (2 of 2 - 13+ 2-dose series) 01/09/2002    Pneumococcal 0-49 years Vaccine (1 of 2 - PCV) Never done    Cervical cancer screen  07/23/2023    COVID-19 Vaccine (1 - 2024-25 season) Never done    Flu vaccine (Season Ended) 08/01/2025    Depression Screen  09/03/2025    DTaP/Tdap/Td vaccine (3 - Td or Tdap) 10/29/2028    Hepatitis C screen  Completed    HIV screen  Completed    Hepatitis A vaccine  Aged Out    Hib vaccine  Aged Out    HPV vaccine  Aged Out    Polio vaccine  Aged Out    Meningococcal (ACWY) vaccine  Aged Out    Meningococcal B vaccine  Aged Out    Depression Monitoring  Discontinued    Diabetes screen  Discontinued             
screenings:  Patient was counseled on preventive measures and screenings offered today. Rationale of preventive and screening measures and consequences of delayed screening explained. Answered questions and patient continued to decline at this time. Will continue to address at follow up appointment.    Requested Prescriptions     Signed Prescriptions Disp Refills    albuterol (PROVENTIL) (2.5 MG/3ML) 0.083% nebulizer solution 120 each 3     Sig: Take 3 mLs by nebulization 4 times daily as needed for Wheezing    budesonide-formoterol (SYMBICORT) 160-4.5 MCG/ACT AERO 10.2 g 3     Sig: Inhale 2 puffs into the lungs 2 times daily    predniSONE (DELTASONE) 20 MG tablet 10 tablet 0     Sig: Take 2 tablets by mouth daily for 5 days       There are no discontinued medications.    Moe received counseling on the following healthy behaviors: nutrition, exercise and medication adherence.    Discussed use, benefit, and side effects of prescribed medications.  Barriers to medication compliance addressed.     Patient does not have any other acute concerns today. Patient denies any other symptoms or complaints. All patient's questions answered today.     Advises and instructions given to patient if any new unusual or worrisome symptoms develops including but not limited to severe headaches, blurry or any new change in vision, dizziness, lightheadedness, earache, hearing changes, swollen tongue/lips, difficulty breathing or new shortness in breath, chest pain, palpitations, flutters, numbness, weakness patient should immediately call 911 or go to the emergency department for further evaluation and treatment if necessary. Patient can always call our office at any time for any questions or concerns.     All patient questions answered.  Pt voiced understanding.     Return in about 1 week (around 4/11/2025), or if symptoms worsen or fail to improve.        Disclaimer: Some orall of this note was transcribed using voice-recognition

## 2025-04-22 ENCOUNTER — OFFICE VISIT (OUTPATIENT)
Dept: FAMILY MEDICINE CLINIC | Age: 40
End: 2025-04-22
Payer: COMMERCIAL

## 2025-04-22 VITALS
SYSTOLIC BLOOD PRESSURE: 96 MMHG | WEIGHT: 235.4 LBS | HEIGHT: 62 IN | BODY MASS INDEX: 43.32 KG/M2 | HEART RATE: 91 BPM | OXYGEN SATURATION: 100 % | DIASTOLIC BLOOD PRESSURE: 62 MMHG

## 2025-04-22 DIAGNOSIS — G47.33 OSA (OBSTRUCTIVE SLEEP APNEA): ICD-10-CM

## 2025-04-22 DIAGNOSIS — J45.20 MILD INTERMITTENT ASTHMA WITHOUT COMPLICATION: Primary | ICD-10-CM

## 2025-04-22 PROCEDURE — 99213 OFFICE O/P EST LOW 20 MIN: CPT

## 2025-04-22 RX ORDER — BENZONATATE 100 MG/1
100 CAPSULE ORAL 2 TIMES DAILY PRN
Qty: 20 CAPSULE | Refills: 0 | Status: SHIPPED | OUTPATIENT
Start: 2025-04-22 | End: 2025-05-02

## 2025-04-22 ASSESSMENT — ENCOUNTER SYMPTOMS
EYE ITCHING: 0
CHEST TIGHTNESS: 1
SHORTNESS OF BREATH: 1
VOICE CHANGE: 0
EYE DISCHARGE: 0
EYE REDNESS: 0
SORE THROAT: 0
TROUBLE SWALLOWING: 0
WHEEZING: 0
COUGH: 1
SINUS PAIN: 0

## 2025-04-22 NOTE — PROGRESS NOTES
Visit Information    Have you changed or started any medications since your last visit including any over-the-counter medicines, vitamins, or herbal medicines? no   Have you stopped taking any of your medications? Is so, why? -  no  Are you having any side effects from any of your medications? - no    Have you seen any other physician or provider since your last visit?  no   Have you had any other diagnostic tests since your last visit?  no   Have you been seen in the emergency room and/or had an admission in a hospital since we last saw you?  no   Have you had your routine dental cleaning in the past 6 months?  no     Do you have an active MyChart account? If no, what is the barrier?  Yes    Patient Care Team:  Gisselle Evans MD as PCP - General (Family Medicine)  Gabriela Doran MD as PCP - Empaneled Provider  Adi Andrew MD as Obstetrician (Perinatology)  Caroline Corbett DO as Resident (Obstetrics & Gynecology)    Medical History Review  Past Medical, Family, and Social History reviewed and does not contribute to the patient presenting condition    Health Maintenance   Topic Date Due    Hepatitis B vaccine (2 of 3 - 3-dose series) 01/09/2002    Varicella vaccine (2 of 2 - 13+ 2-dose series) 01/09/2002    Pneumococcal 0-49 years Vaccine (1 of 2 - PCV) Never done    Cervical cancer screen  07/23/2023    COVID-19 Vaccine (1 - 2024-25 season) Never done    Flu vaccine (Season Ended) 08/01/2025    Depression Screen  04/04/2026    DTaP/Tdap/Td vaccine (3 - Td or Tdap) 10/29/2028    Hepatitis C screen  Completed    HIV screen  Completed    Hepatitis A vaccine  Aged Out    Hib vaccine  Aged Out    HPV vaccine  Aged Out    Polio vaccine  Aged Out    Meningococcal (ACWY) vaccine  Aged Out    Meningococcal B vaccine  Aged Out    Depression Monitoring  Discontinued    Diabetes screen  Discontinued

## 2025-04-22 NOTE — PROGRESS NOTES
Attending Physician Statement  I have discussed the care of Moe Esquivel, including pertinent history and exam findings,  with the resident. I have reviewed the key elements of all parts of the encounter with the resident.  I agree with the assessment, plan and orders as documented by the resident.  (GE Modifier)    Chema Agrawal MD

## 2025-04-22 NOTE — PATIENT INSTRUCTIONS
Thank you for letting us take care of you today. We hope all your questions were addressed. If a question was overlooked or something else comes to mind after you return home, please contact a member of your Care Team listed below.        Your Care Team at [facility] is Team #4  Michelle Christopher M.D. (Faculty)  Cristian Craig M.D. (Resident)  Catherine Steinberg M.D.  (Resident)  Adelita Tate M.D. (Resident)  Gisselle Evans M.D. (Resident)  Solomon Aldrich, ROXANA [place], [facility]  La Nena Hennessy, [facility]  Anisa Scott, [facility]  Elise Mitchell, [facility]  Octavia Casillas, [facility]  Leilani Mathews (LJ) CHARLES Garcia (Clinical Practice Manager)  Kiya Iniguez [facility] (Clinical Pharmacist)       Office phone number: [phone]    If you need to get in right away due to illness, please be advised we have \"Same Day\" appointments available Monday-Friday. Please call us at [phone] option #3 to schedule your \"Same Day\" appointment.

## 2025-04-22 NOTE — PROGRESS NOTES
[facility] Program - Outpatient Note      Subjective:    Moe Esquivel is a 39 y.o. female with  has a past medical history of Allergic rhinitis with postnasal drip, Asthma, Blood transfusion reaction, Chronic migraine without aura with status migrainosus, not intractable, Complication of anesthesia, History of blood transfusion, Microcytic anemia, and Sickle cell trait.    Presented to the office today for:  Chief Complaint   Patient presents with    Follow-up     SOB - tight chest .  Patient is feeling much better , f/u on medication       HPI    Patient here for follow up for asthma. She was recently seen in the clinic for asthma exacerbation on 4/4/25. She was prescribed short course of steroid and started on Symbicort inhaler. She says that her shortness of breath has improved but is not at baseline and she still feels chest tightness. She continues to have cough with some yellow phlegm production but has improved. She denies any chest pain, any fevers, worsening cough, nasal congestion, sore throat. She is using albuterol nebulizer once a day for symptoms. She does wake up at nighttime but relates it to her sleep apnea. She was started on CPAP but stopped using it 3 months ago. Counseled on side effect of sleep apnea including short term like daytime fatigue and long term including pulmonary hypertension and pleural effusion.      Review of Systems   Constitutional:  Negative for activity change, chills, fatigue and fever.   HENT:  Negative for congestion, postnasal drip, sinus pain, sneezing, sore throat, trouble swallowing and voice change.    Eyes:  Negative for discharge, redness and itching.   Respiratory:  Positive for cough, chest tightness and shortness of breath. Negative for wheezing.    Cardiovascular:  Negative for chest pain and leg swelling.       The patient has a   Family History   Problem Relation Age of Onset    Diabetes Father     Hypertension